# Patient Record
Sex: FEMALE | Race: WHITE | NOT HISPANIC OR LATINO | URBAN - METROPOLITAN AREA
[De-identification: names, ages, dates, MRNs, and addresses within clinical notes are randomized per-mention and may not be internally consistent; named-entity substitution may affect disease eponyms.]

---

## 2019-06-19 ENCOUNTER — EMERGENCY (EMERGENCY)
Facility: HOSPITAL | Age: 54
LOS: 1 days | Discharge: ANOTHER HOSPITAL | End: 2019-06-19
Attending: EMERGENCY MEDICINE | Admitting: EMERGENCY MEDICINE
Payer: COMMERCIAL

## 2019-06-19 VITALS
HEART RATE: 121 BPM | WEIGHT: 259.93 LBS | RESPIRATION RATE: 20 BRPM | SYSTOLIC BLOOD PRESSURE: 184 MMHG | DIASTOLIC BLOOD PRESSURE: 122 MMHG | TEMPERATURE: 98 F

## 2019-06-19 PROCEDURE — 99291 CRITICAL CARE FIRST HOUR: CPT

## 2019-06-19 NOTE — ED ADULT NURSE NOTE - OBJECTIVE STATEMENT
Pt comes from home by EMS. Pt reports her left arm started spasming at 8pm with neck pain. Pt denies chest pain. Pt reports anxiety, taking a xanax at home. Pt breathing fast due to anxiety, reports oxygen was helping her with anxiety. Pt has equal  strength, weakness to left arm. Pt HR 120s, placed on telemetry monitor. Pt reports being constipated for a week and taking laxatives.

## 2019-06-20 ENCOUNTER — INPATIENT (INPATIENT)
Facility: HOSPITAL | Age: 54
LOS: 6 days | Discharge: NURSING, REHAB OR LONG-TERM CARE | DRG: 34 | End: 2019-06-27
Attending: PSYCHIATRY & NEUROLOGY | Admitting: PSYCHIATRY & NEUROLOGY
Payer: COMMERCIAL

## 2019-06-20 VITALS
HEART RATE: 109 BPM | OXYGEN SATURATION: 97 % | SYSTOLIC BLOOD PRESSURE: 170 MMHG | RESPIRATION RATE: 20 BRPM | TEMPERATURE: 98 F | DIASTOLIC BLOOD PRESSURE: 105 MMHG

## 2019-06-20 VITALS
HEART RATE: 119 BPM | WEIGHT: 256.4 LBS | OXYGEN SATURATION: 100 % | DIASTOLIC BLOOD PRESSURE: 111 MMHG | RESPIRATION RATE: 22 BRPM | SYSTOLIC BLOOD PRESSURE: 168 MMHG

## 2019-06-20 DIAGNOSIS — I63.9 CEREBRAL INFARCTION, UNSPECIFIED: ICD-10-CM

## 2019-06-20 LAB
ALBUMIN SERPL ELPH-MCNC: 3.6 G/DL — SIGNIFICANT CHANGE UP (ref 3.3–5)
ALBUMIN SERPL ELPH-MCNC: 3.9 G/DL — SIGNIFICANT CHANGE UP (ref 3.3–5)
ALP SERPL-CCNC: 76 U/L — SIGNIFICANT CHANGE UP (ref 40–120)
ALP SERPL-CCNC: 86 U/L — SIGNIFICANT CHANGE UP (ref 40–120)
ALT FLD-CCNC: 26 U/L — SIGNIFICANT CHANGE UP (ref 10–45)
ALT FLD-CCNC: 33 U/L — SIGNIFICANT CHANGE UP (ref 12–78)
ANION GAP SERPL CALC-SCNC: 16 MMOL/L — SIGNIFICANT CHANGE UP (ref 5–17)
ANION GAP SERPL CALC-SCNC: 18 MMOL/L — HIGH (ref 5–17)
ANION GAP SERPL CALC-SCNC: 9 MMOL/L — SIGNIFICANT CHANGE UP (ref 5–17)
APPEARANCE UR: CLEAR — SIGNIFICANT CHANGE UP
APTT BLD: 23.7 SEC — LOW (ref 27.5–36.3)
APTT BLD: 25.2 SEC — LOW (ref 27.5–36.3)
APTT BLD: 27.3 SEC — LOW (ref 27.5–36.3)
APTT BLD: 31.7 SEC — SIGNIFICANT CHANGE UP (ref 27.5–36.3)
APTT BLD: 39.3 SEC — HIGH (ref 27.5–36.3)
AST SERPL-CCNC: 18 U/L — SIGNIFICANT CHANGE UP (ref 10–40)
AST SERPL-CCNC: 24 U/L — SIGNIFICANT CHANGE UP (ref 15–37)
BASE EXCESS BLDA CALC-SCNC: -4.5 MMOL/L — LOW (ref -2–2)
BASOPHILS # BLD AUTO: 0.1 K/UL — SIGNIFICANT CHANGE UP (ref 0–0.2)
BASOPHILS # BLD AUTO: 0.13 K/UL — SIGNIFICANT CHANGE UP (ref 0–0.2)
BASOPHILS NFR BLD AUTO: 0.4 % — SIGNIFICANT CHANGE UP (ref 0–2)
BASOPHILS NFR BLD AUTO: 0.7 % — SIGNIFICANT CHANGE UP (ref 0–2)
BILIRUB SERPL-MCNC: 1.7 MG/DL — HIGH (ref 0.2–1.2)
BILIRUB SERPL-MCNC: 1.9 MG/DL — HIGH (ref 0.2–1.2)
BILIRUB UR-MCNC: NEGATIVE — SIGNIFICANT CHANGE UP
BLD GP AB SCN SERPL QL: NEGATIVE — SIGNIFICANT CHANGE UP
BLOOD GAS COMMENTS ARTERIAL: SIGNIFICANT CHANGE UP
BLOOD GAS COMMENTS ARTERIAL: SIGNIFICANT CHANGE UP
BUN SERPL-MCNC: 15 MG/DL — SIGNIFICANT CHANGE UP (ref 7–23)
BUN SERPL-MCNC: 15 MG/DL — SIGNIFICANT CHANGE UP (ref 7–23)
BUN SERPL-MCNC: 19 MG/DL — SIGNIFICANT CHANGE UP (ref 7–23)
CALCIUM SERPL-MCNC: 8.4 MG/DL — LOW (ref 8.5–10.1)
CALCIUM SERPL-MCNC: 9 MG/DL — SIGNIFICANT CHANGE UP (ref 8.4–10.5)
CALCIUM SERPL-MCNC: 9.2 MG/DL — SIGNIFICANT CHANGE UP (ref 8.4–10.5)
CHLORIDE SERPL-SCNC: 100 MMOL/L — SIGNIFICANT CHANGE UP (ref 96–108)
CHLORIDE SERPL-SCNC: 103 MMOL/L — SIGNIFICANT CHANGE UP (ref 96–108)
CHLORIDE SERPL-SCNC: 99 MMOL/L — SIGNIFICANT CHANGE UP (ref 96–108)
CO2 SERPL-SCNC: 21 MMOL/L — LOW (ref 22–31)
CO2 SERPL-SCNC: 24 MMOL/L — SIGNIFICANT CHANGE UP (ref 22–31)
CO2 SERPL-SCNC: 24 MMOL/L — SIGNIFICANT CHANGE UP (ref 22–31)
COLOR SPEC: SIGNIFICANT CHANGE UP
CREAT SERPL-MCNC: 0.99 MG/DL — SIGNIFICANT CHANGE UP (ref 0.5–1.3)
CREAT SERPL-MCNC: 1.03 MG/DL — SIGNIFICANT CHANGE UP (ref 0.5–1.3)
CREAT SERPL-MCNC: 1.1 MG/DL — SIGNIFICANT CHANGE UP (ref 0.5–1.3)
D DIMER BLD IA.RAPID-MCNC: 254 NG/ML DDU — HIGH
DIFF PNL FLD: NEGATIVE — SIGNIFICANT CHANGE UP
EOSINOPHIL # BLD AUTO: 0 K/UL — SIGNIFICANT CHANGE UP (ref 0–0.5)
EOSINOPHIL # BLD AUTO: 0.12 K/UL — SIGNIFICANT CHANGE UP (ref 0–0.5)
EOSINOPHIL NFR BLD AUTO: 0.1 % — SIGNIFICANT CHANGE UP (ref 0–6)
EOSINOPHIL NFR BLD AUTO: 0.7 % — SIGNIFICANT CHANGE UP (ref 0–6)
GAS PNL BLDV: SIGNIFICANT CHANGE UP
GLUCOSE SERPL-MCNC: 122 MG/DL — HIGH (ref 70–99)
GLUCOSE SERPL-MCNC: 154 MG/DL — HIGH (ref 70–99)
GLUCOSE SERPL-MCNC: 167 MG/DL — HIGH (ref 70–99)
GLUCOSE UR QL: NEGATIVE — SIGNIFICANT CHANGE UP
HCG SERPL-ACNC: <1 MIU/ML — SIGNIFICANT CHANGE UP
HCG SERPL-ACNC: <2 MIU/ML — SIGNIFICANT CHANGE UP
HCG SERPL-ACNC: <2 MIU/ML — SIGNIFICANT CHANGE UP
HCO3 BLDA-SCNC: 20 MMOL/L — LOW (ref 23–27)
HCT VFR BLD CALC: 47.8 % — HIGH (ref 34.5–45)
HCT VFR BLD CALC: 49.9 % — HIGH (ref 34.5–45)
HCT VFR BLD CALC: 51.5 % — HIGH (ref 34.5–45)
HGB BLD-MCNC: 15.3 G/DL — SIGNIFICANT CHANGE UP (ref 11.5–15.5)
HGB BLD-MCNC: 15.4 G/DL — SIGNIFICANT CHANGE UP (ref 11.5–15.5)
HGB BLD-MCNC: 15.9 G/DL — HIGH (ref 11.5–15.5)
HOROWITZ INDEX BLDA+IHG-RTO: 100 — SIGNIFICANT CHANGE UP
IMM GRANULOCYTES NFR BLD AUTO: 0.4 % — SIGNIFICANT CHANGE UP (ref 0–1.5)
INR BLD: 1.29 RATIO — HIGH (ref 0.88–1.16)
INR BLD: 1.33 RATIO — HIGH (ref 0.88–1.16)
INR BLD: 1.35 RATIO — HIGH (ref 0.88–1.16)
INR BLD: 1.36 RATIO — HIGH (ref 0.88–1.16)
KETONES UR-MCNC: NEGATIVE — SIGNIFICANT CHANGE UP
LACTATE SERPL-SCNC: 1.3 MMOL/L — SIGNIFICANT CHANGE UP (ref 0.7–2)
LEUKOCYTE ESTERASE UR-ACNC: NEGATIVE — SIGNIFICANT CHANGE UP
LYMPHOCYTES # BLD AUTO: 1.15 K/UL — SIGNIFICANT CHANGE UP (ref 1–3.3)
LYMPHOCYTES # BLD AUTO: 1.3 K/UL — SIGNIFICANT CHANGE UP (ref 1–3.3)
LYMPHOCYTES # BLD AUTO: 6.4 % — LOW (ref 13–44)
LYMPHOCYTES # BLD AUTO: 8.5 % — LOW (ref 13–44)
MCHC RBC-ENTMCNC: 22.5 PG — LOW (ref 27–34)
MCHC RBC-ENTMCNC: 23.5 PG — LOW (ref 27–34)
MCHC RBC-ENTMCNC: 24.5 PG — LOW (ref 27–34)
MCHC RBC-ENTMCNC: 30.7 GM/DL — LOW (ref 32–36)
MCHC RBC-ENTMCNC: 31 GM/DL — LOW (ref 32–36)
MCHC RBC-ENTMCNC: 32.2 GM/DL — SIGNIFICANT CHANGE UP (ref 32–36)
MCV RBC AUTO: 73.4 FL — LOW (ref 80–100)
MCV RBC AUTO: 75.9 FL — LOW (ref 80–100)
MCV RBC AUTO: 76 FL — LOW (ref 80–100)
MONOCYTES # BLD AUTO: 0.5 K/UL — SIGNIFICANT CHANGE UP (ref 0–0.9)
MONOCYTES # BLD AUTO: 0.51 K/UL — SIGNIFICANT CHANGE UP (ref 0–0.9)
MONOCYTES NFR BLD AUTO: 2.9 % — SIGNIFICANT CHANGE UP (ref 2–14)
MONOCYTES NFR BLD AUTO: 3.3 % — SIGNIFICANT CHANGE UP (ref 2–14)
NEUTROPHILS # BLD AUTO: 13.9 K/UL — HIGH (ref 1.8–7.4)
NEUTROPHILS # BLD AUTO: 15.89 K/UL — HIGH (ref 1.8–7.4)
NEUTROPHILS NFR BLD AUTO: 87.7 % — HIGH (ref 43–77)
NEUTROPHILS NFR BLD AUTO: 88.9 % — HIGH (ref 43–77)
NITRITE UR-MCNC: NEGATIVE — SIGNIFICANT CHANGE UP
NRBC # BLD: 0 /100 WBCS — SIGNIFICANT CHANGE UP (ref 0–0)
PCO2 BLDA: 46 MMHG — SIGNIFICANT CHANGE UP (ref 32–46)
PH BLDA: 7.28 — LOW (ref 7.35–7.45)
PH UR: 6.5 — SIGNIFICANT CHANGE UP (ref 5–8)
PLATELET # BLD AUTO: 334 K/UL — SIGNIFICANT CHANGE UP (ref 150–400)
PLATELET # BLD AUTO: 397 K/UL — SIGNIFICANT CHANGE UP (ref 150–400)
PLATELET # BLD AUTO: 397 K/UL — SIGNIFICANT CHANGE UP (ref 150–400)
PO2 BLDA: 134 MMHG — HIGH (ref 74–108)
POTASSIUM SERPL-MCNC: 3.7 MMOL/L — SIGNIFICANT CHANGE UP (ref 3.5–5.3)
POTASSIUM SERPL-MCNC: 4 MMOL/L — SIGNIFICANT CHANGE UP (ref 3.5–5.3)
POTASSIUM SERPL-MCNC: 4.5 MMOL/L — SIGNIFICANT CHANGE UP (ref 3.5–5.3)
POTASSIUM SERPL-SCNC: 3.7 MMOL/L — SIGNIFICANT CHANGE UP (ref 3.5–5.3)
POTASSIUM SERPL-SCNC: 4 MMOL/L — SIGNIFICANT CHANGE UP (ref 3.5–5.3)
POTASSIUM SERPL-SCNC: 4.5 MMOL/L — SIGNIFICANT CHANGE UP (ref 3.5–5.3)
PROT SERPL-MCNC: 7.1 G/DL — SIGNIFICANT CHANGE UP (ref 6–8.3)
PROT SERPL-MCNC: 7.1 G/DL — SIGNIFICANT CHANGE UP (ref 6–8.3)
PROT UR-MCNC: ABNORMAL
PROTHROM AB SERPL-ACNC: 14.7 SEC — HIGH (ref 10–12.9)
PROTHROM AB SERPL-ACNC: 15.4 SEC — HIGH (ref 10–12.9)
PROTHROM AB SERPL-ACNC: 15.5 SEC — HIGH (ref 10–12.9)
PROTHROM AB SERPL-ACNC: 15.8 SEC — HIGH (ref 10–12.9)
RAPID RVP RESULT: SIGNIFICANT CHANGE UP
RBC # BLD: 6.29 M/UL — HIGH (ref 3.8–5.2)
RBC # BLD: 6.78 M/UL — HIGH (ref 3.8–5.2)
RBC # BLD: 6.8 M/UL — HIGH (ref 3.8–5.2)
RBC # FLD: 18.3 % — HIGH (ref 10.3–14.5)
RBC # FLD: 18.6 % — HIGH (ref 10.3–14.5)
RBC # FLD: 20.4 % — HIGH (ref 10.3–14.5)
RH IG SCN BLD-IMP: POSITIVE — SIGNIFICANT CHANGE UP
SAO2 % BLDA: 98 % — HIGH (ref 92–96)
SODIUM SERPL-SCNC: 136 MMOL/L — SIGNIFICANT CHANGE UP (ref 135–145)
SODIUM SERPL-SCNC: 138 MMOL/L — SIGNIFICANT CHANGE UP (ref 135–145)
SODIUM SERPL-SCNC: 140 MMOL/L — SIGNIFICANT CHANGE UP (ref 135–145)
SP GR SPEC: 1.02 — SIGNIFICANT CHANGE UP (ref 1.01–1.02)
T3 SERPL-MCNC: 89 NG/DL — SIGNIFICANT CHANGE UP (ref 80–200)
T4 AB SER-ACNC: 7.3 UG/DL — SIGNIFICANT CHANGE UP (ref 4.6–12)
TROPONIN I SERPL-MCNC: <.015 NG/ML — SIGNIFICANT CHANGE UP (ref 0.01–0.04)
TROPONIN T, HIGH SENSITIVITY RESULT: 28 NG/L — SIGNIFICANT CHANGE UP (ref 0–51)
TSH SERPL-MCNC: 7.3 UIU/ML — HIGH (ref 0.27–4.2)
UROBILINOGEN FLD QL: NEGATIVE — SIGNIFICANT CHANGE UP
WBC # BLD: 15.8 K/UL — HIGH (ref 3.8–10.5)
WBC # BLD: 16.9 K/UL — HIGH (ref 3.8–10.5)
WBC # BLD: 17.88 K/UL — HIGH (ref 3.8–10.5)
WBC # FLD AUTO: 15.8 K/UL — HIGH (ref 3.8–10.5)
WBC # FLD AUTO: 16.9 K/UL — HIGH (ref 3.8–10.5)
WBC # FLD AUTO: 17.88 K/UL — HIGH (ref 3.8–10.5)

## 2019-06-20 PROCEDURE — 0042T: CPT

## 2019-06-20 PROCEDURE — 70450 CT HEAD/BRAIN W/O DYE: CPT | Mod: 26,59

## 2019-06-20 PROCEDURE — 71045 X-RAY EXAM CHEST 1 VIEW: CPT | Mod: 26

## 2019-06-20 PROCEDURE — 95816 EEG AWAKE AND DROWSY: CPT | Mod: 26

## 2019-06-20 PROCEDURE — 99223 1ST HOSP IP/OBS HIGH 75: CPT

## 2019-06-20 PROCEDURE — 80053 COMPREHEN METABOLIC PANEL: CPT

## 2019-06-20 PROCEDURE — 99291 CRITICAL CARE FIRST HOUR: CPT

## 2019-06-20 PROCEDURE — 93010 ELECTROCARDIOGRAM REPORT: CPT | Mod: 59

## 2019-06-20 PROCEDURE — 70498 CT ANGIOGRAPHY NECK: CPT

## 2019-06-20 PROCEDURE — 71045 X-RAY EXAM CHEST 1 VIEW: CPT

## 2019-06-20 PROCEDURE — 84702 CHORIONIC GONADOTROPIN TEST: CPT

## 2019-06-20 PROCEDURE — 85610 PROTHROMBIN TIME: CPT

## 2019-06-20 PROCEDURE — 96376 TX/PRO/DX INJ SAME DRUG ADON: CPT

## 2019-06-20 PROCEDURE — 95951: CPT | Mod: 26

## 2019-06-20 PROCEDURE — 99285 EMERGENCY DEPT VISIT HI MDM: CPT | Mod: 25

## 2019-06-20 PROCEDURE — 99291 CRITICAL CARE FIRST HOUR: CPT | Mod: 25

## 2019-06-20 PROCEDURE — 70551 MRI BRAIN STEM W/O DYE: CPT | Mod: 26

## 2019-06-20 PROCEDURE — 70450 CT HEAD/BRAIN W/O DYE: CPT | Mod: 26,59,77

## 2019-06-20 PROCEDURE — 93005 ELECTROCARDIOGRAM TRACING: CPT

## 2019-06-20 PROCEDURE — 36415 COLL VENOUS BLD VENIPUNCTURE: CPT

## 2019-06-20 PROCEDURE — 70498 CT ANGIOGRAPHY NECK: CPT | Mod: 26

## 2019-06-20 PROCEDURE — 82803 BLOOD GASES ANY COMBINATION: CPT

## 2019-06-20 PROCEDURE — 93010 ELECTROCARDIOGRAM REPORT: CPT

## 2019-06-20 PROCEDURE — 85027 COMPLETE CBC AUTOMATED: CPT

## 2019-06-20 PROCEDURE — 85730 THROMBOPLASTIN TIME PARTIAL: CPT

## 2019-06-20 PROCEDURE — 83880 ASSAY OF NATRIURETIC PEPTIDE: CPT

## 2019-06-20 PROCEDURE — 70496 CT ANGIOGRAPHY HEAD: CPT

## 2019-06-20 PROCEDURE — 71045 X-RAY EXAM CHEST 1 VIEW: CPT | Mod: 26,77

## 2019-06-20 PROCEDURE — 70450 CT HEAD/BRAIN W/O DYE: CPT

## 2019-06-20 PROCEDURE — 84484 ASSAY OF TROPONIN QUANT: CPT

## 2019-06-20 PROCEDURE — 70496 CT ANGIOGRAPHY HEAD: CPT | Mod: 26

## 2019-06-20 PROCEDURE — 96374 THER/PROPH/DIAG INJ IV PUSH: CPT

## 2019-06-20 RX ORDER — HEPARIN SODIUM 5000 [USP'U]/ML
15.5 INJECTION INTRAVENOUS; SUBCUTANEOUS
Qty: 25000 | Refills: 0 | Status: DISCONTINUED | OUTPATIENT
Start: 2019-06-20 | End: 2019-06-20

## 2019-06-20 RX ORDER — INSULIN LISPRO 100/ML
VIAL (ML) SUBCUTANEOUS
Refills: 0 | Status: DISCONTINUED | OUTPATIENT
Start: 2019-06-20 | End: 2019-06-20

## 2019-06-20 RX ORDER — HEPARIN SODIUM 5000 [USP'U]/ML
1200 INJECTION INTRAVENOUS; SUBCUTANEOUS
Qty: 25000 | Refills: 0 | Status: DISCONTINUED | OUTPATIENT
Start: 2019-06-20 | End: 2019-06-20

## 2019-06-20 RX ORDER — LEVETIRACETAM 250 MG/1
1000 TABLET, FILM COATED ORAL EVERY 12 HOURS
Refills: 0 | Status: DISCONTINUED | OUTPATIENT
Start: 2019-06-20 | End: 2019-06-22

## 2019-06-20 RX ORDER — FUROSEMIDE 40 MG
20 TABLET ORAL ONCE
Refills: 0 | Status: COMPLETED | OUTPATIENT
Start: 2019-06-20 | End: 2019-06-20

## 2019-06-20 RX ORDER — HYDRALAZINE HCL 50 MG
5 TABLET ORAL EVERY 6 HOURS
Refills: 0 | Status: DISCONTINUED | OUTPATIENT
Start: 2019-06-20 | End: 2019-06-22

## 2019-06-20 RX ORDER — HEPARIN SODIUM 5000 [USP'U]/ML
1550 INJECTION INTRAVENOUS; SUBCUTANEOUS
Qty: 25000 | Refills: 0 | Status: DISCONTINUED | OUTPATIENT
Start: 2019-06-20 | End: 2019-06-21

## 2019-06-20 RX ORDER — NICOTINE POLACRILEX 2 MG
1 GUM BUCCAL DAILY
Refills: 0 | Status: DISCONTINUED | OUTPATIENT
Start: 2019-06-20 | End: 2019-06-27

## 2019-06-20 RX ORDER — SODIUM CHLORIDE 9 MG/ML
1000 INJECTION INTRAMUSCULAR; INTRAVENOUS; SUBCUTANEOUS ONCE
Refills: 0 | Status: COMPLETED | OUTPATIENT
Start: 2019-06-20 | End: 2019-06-20

## 2019-06-20 RX ORDER — LABETALOL HCL 100 MG
5 TABLET ORAL ONCE
Refills: 0 | Status: COMPLETED | OUTPATIENT
Start: 2019-06-20 | End: 2019-06-20

## 2019-06-20 RX ORDER — LABETALOL HCL 100 MG
10 TABLET ORAL ONCE
Refills: 0 | Status: COMPLETED | OUTPATIENT
Start: 2019-06-20 | End: 2019-06-21

## 2019-06-20 RX ORDER — LEVETIRACETAM 250 MG/1
1000 TABLET, FILM COATED ORAL ONCE
Refills: 0 | Status: DISCONTINUED | OUTPATIENT
Start: 2019-06-20 | End: 2019-06-20

## 2019-06-20 RX ORDER — HEPARIN SODIUM 5000 [USP'U]/ML
INJECTION INTRAVENOUS; SUBCUTANEOUS
Qty: 25000 | Refills: 0 | Status: DISCONTINUED | OUTPATIENT
Start: 2019-06-20 | End: 2019-06-20

## 2019-06-20 RX ORDER — ACETAMINOPHEN 500 MG
650 TABLET ORAL EVERY 6 HOURS
Refills: 0 | Status: DISCONTINUED | OUTPATIENT
Start: 2019-06-20 | End: 2019-06-27

## 2019-06-20 RX ORDER — ATORVASTATIN CALCIUM 80 MG/1
80 TABLET, FILM COATED ORAL AT BEDTIME
Refills: 0 | Status: DISCONTINUED | OUTPATIENT
Start: 2019-06-20 | End: 2019-06-27

## 2019-06-20 RX ORDER — LEVETIRACETAM 250 MG/1
1000 TABLET, FILM COATED ORAL ONCE
Refills: 0 | Status: COMPLETED | OUTPATIENT
Start: 2019-06-20 | End: 2019-06-20

## 2019-06-20 RX ORDER — HYDRALAZINE HCL 50 MG
5 TABLET ORAL ONCE
Refills: 0 | Status: DISCONTINUED | OUTPATIENT
Start: 2019-06-20 | End: 2019-06-20

## 2019-06-20 RX ORDER — HEPARIN SODIUM 5000 [USP'U]/ML
4500 INJECTION INTRAVENOUS; SUBCUTANEOUS EVERY 6 HOURS
Refills: 0 | Status: DISCONTINUED | OUTPATIENT
Start: 2019-06-20 | End: 2019-06-20

## 2019-06-20 RX ORDER — FOSPHENYTOIN 50 MG/ML
2300 INJECTION INTRAMUSCULAR; INTRAVENOUS ONCE
Refills: 0 | Status: DISCONTINUED | OUTPATIENT
Start: 2019-06-20 | End: 2019-06-20

## 2019-06-20 RX ORDER — HEPARIN SODIUM 5000 [USP'U]/ML
9500 INJECTION INTRAVENOUS; SUBCUTANEOUS EVERY 6 HOURS
Refills: 0 | Status: DISCONTINUED | OUTPATIENT
Start: 2019-06-20 | End: 2019-06-20

## 2019-06-20 RX ADMIN — LEVETIRACETAM 400 MILLIGRAM(S): 250 TABLET, FILM COATED ORAL at 19:03

## 2019-06-20 RX ADMIN — Medication 1 MILLIGRAM(S): at 18:27

## 2019-06-20 RX ADMIN — Medication 20 MILLIGRAM(S): at 17:09

## 2019-06-20 RX ADMIN — Medication 0.5 MILLIGRAM(S): at 01:19

## 2019-06-20 RX ADMIN — Medication 1 PATCH: at 17:05

## 2019-06-20 RX ADMIN — Medication 2 MILLIGRAM(S): at 09:53

## 2019-06-20 RX ADMIN — SODIUM CHLORIDE 1000 MILLILITER(S): 9 INJECTION INTRAMUSCULAR; INTRAVENOUS; SUBCUTANEOUS at 00:29

## 2019-06-20 RX ADMIN — Medication 0.5 MILLIGRAM(S): at 09:45

## 2019-06-20 RX ADMIN — Medication 1 MILLIGRAM(S): at 06:23

## 2019-06-20 RX ADMIN — Medication 5 MILLIGRAM(S): at 08:25

## 2019-06-20 RX ADMIN — HEPARIN SODIUM 15.5 UNIT(S)/HR: 5000 INJECTION INTRAVENOUS; SUBCUTANEOUS at 23:43

## 2019-06-20 RX ADMIN — Medication 1 MILLIGRAM(S): at 02:45

## 2019-06-20 RX ADMIN — LEVETIRACETAM 400 MILLIGRAM(S): 250 TABLET, FILM COATED ORAL at 09:45

## 2019-06-20 RX ADMIN — ATORVASTATIN CALCIUM 80 MILLIGRAM(S): 80 TABLET, FILM COATED ORAL at 22:06

## 2019-06-20 NOTE — ED ADULT NURSE REASSESSMENT NOTE - NS ED NURSE REASSESS COMMENT FT1
Pt's left arm noted to be twitching with shortness of breath. MD Mittal called to bedside. Vitals as charted, pt given 1mg of ativan. Set up for intubation as pt breathing extremely shallow on nonrebreather, unable to catch breathing. Respiratory at bedside. Pt started to calm down, breathing improved, no intubation. Will attempt bipap, abg being drawn.

## 2019-06-20 NOTE — CONSULT NOTE ADULT - ASSESSMENT
53F w/ pmhx of obesity, 40 pack year smoking history present with right MCA stroke setting of bilateral severe carotid stenosis w/ course complicated by focal seizure activity and hypoxia     Assessment   # R MCA stroke - There is concern in the CTA for filling defect in the anterior cavernous segment suspicious for thrombus causing critical stenosis/focal occlusion in the right ICA.   # Carotid Artery Stenosis - bilateral severe disease in the internal carotids.  # Seizure Activity  # Hypertensive emergency - Patient appears to have flash pulmonary edema in the setting of hypertensive crisis requiring escalating respiratory support including BiPAP. Lungs w/ diffuse crackles. Pro-BNP is likely underestimating in setting of obesity.   # Obesity    Plan  # Continue with stroke care per Neurology  # Aspirin, Statin  # Recommend IV 40mg Lasix w/ HTN management per stroke protocol.   # TTE for evaluate for baseline cardiac assessment and will also need KEAGAN to evaluate for cardiac source of embolism  # Will evaluate for carotid stenting    ENRIQUETA Reese MD  Cardiology Fellow (Vascular Cardiology Service)  Cell: [phone] (Weekdays until 5PM)   Please check [email or web address] (Username: Robinson) for remaining hours and daily cardiology fellow schedule and contact information. 53F w/ pmhx of obesity, 40 pack year smoking history present with right MCA stroke setting of bilateral severe carotid stenosis w/ course complicated by focal seizure activity and hypoxia     Assessment   # R MCA stroke - There is concern in the CTA for filling defect in the anterior cavernous segment suspicious for thrombus causing critical stenosis/focal occlusion in the right ICA.   # Carotid Artery Stenosis - bilateral severe disease in the internal carotids.  # Seizure Activity  # Hypertensive emergency - Patient appears to have flash pulmonary edema in the setting of hypertensive crisis requiring escalating respiratory support including BiPAP. Lungs w/ diffuse crackles. Pro-BNP is likely underestimating in setting of obesity.   # Obesity    Plan  # Continue with stroke care per Neurology  # Statin  # Recommend IV 40mg Lasix to help will pulmonary congestion  # HTN management per stroke protocol.   # TTE for evaluate for baseline cardiac assessment and will also need KEAGAN to evaluate for cardiac source of embolism  # Will evaluate for carotid stenting vs CEA     Seen with Dr. Jessica Reese MD  Cardiology Fellow (Vascular Cardiology Service)  Cell: [phone] (Weekdays until 5PM)   Please check [email or web address] (Username: Robinson) for remaining hours and daily cardiology fellow schedule and contact information.

## 2019-06-20 NOTE — OCCUPATIONAL THERAPY INITIAL EVALUATION ADULT - STRENGTHENING, PT EVAL
GOAL: Pt will increase LUE strength to 3+/5 to increase participation in functional tasks in 4 weeks

## 2019-06-20 NOTE — H&P ADULT - ATTENDING COMMENTS
Ms. Sanon is a 52y/o F RH  PMH Anxiety, 40 pk/yr smoking hx(still active), does not get regular checkups presented to [facility] w/ L arm numbness, spasms starting 2000 6/19. LKW 6/19 2000. A phone consult was performed with [facility], Ms. Sanon is a 52y/o F RH  PMH Anxiety, 40 pk/yr smoking hx(still active), does not get regular checkups presented to [facility] w/ L arm numbness, spasms starting 2000 6/19. KRISHW 6/19 2000. A phone consult was performed with [facility] mid she had some worsening of her neurological status but left arm weakness and spasms. At that time she was given Ativan for possible seizure.  A CT noncontrast and CT angiogram of head and neck revealed right frontal hypodensity suggestive of right MCA infarction in the setting of bilateral severe carotid stenosis. She was then transferred to [facility], on exam a stroke scale was 6, alert, awake however unable to move her left arm. She was also noticed to have continuous twitching of left arm and face, drop in oxygen saturation for which she was put on BiPAP in the MICU/neurological ICU consultation was obtained.  Impression: Right MCA artery ischemic infarct in the setting of large artery atherosclerosis, bilateral carotid extracranial stenosis, history of being a smoker for 40 years as her primary risk factor.  head CT after change of her neurological state status has no new infarct or hemorrhage, therefore hoping to resume IV heparin drip Coumadin PTT 50-70  Continue Keppra, speech and swallow eval, continuous EEG monitoring to rule out status epilepticus  For her breathing/respiration, check lower extremity Doppler to rule out DVT and possible CT angio chest when able.  NeuroSx  consulted for revascularization of carotid stenosis. She will need an MRI when stable.

## 2019-06-20 NOTE — ED ADULT NURSE REASSESSMENT NOTE - NS ED NURSE REASSESS COMMENT FT1
pt. administered 5 mg of labetalol IVP for systolic bp of 190s, goal is for systolic of 180 or less, post administration pts systolic bp 185, will continue to evaluate and assess pts neuro status and VS and bp. left arm remains immobile accompanied by twitching and spasms of the left arm visibly seen upon assessing pt. report given to Stroke unit, pt pending transport. no worsening neuro deficits present. safety and fall precautions maintained,.

## 2019-06-20 NOTE — H&P ADULT - HISTORY OF PRESENT ILLNESS
54y/o F PMH Anxiety presented to [facility] w/ L arm numbness, spasms starting 2000 6/19. LKW 6/19 2000. CT/CTA H/N done w/ severe stenosis at the origin of the right internal carotid artery with filling defect in the anterior cavernous segment suspicious for critical stenosis/focal occlusion as well as moderate to severe stenosis of the proximal cervical segment and mild stenosis of the posterior cavernous segment of the left internal carotid artery. Dense L arm paresis noted at 0345AM, transferred to [facility] for potential angio and further management of condition. On initial assessment in ED, NIHSS 6. 54y/o F RH  PMH Anxiety, 40 pk/yr smoking hx(still active), does not get regular checkups presented to [facility] w/ L arm numbness, spasms starting 2000 6/19. LKW 6/19 2000. CT/CTA H/N done w/ severe stenosis at the origin of the right internal carotid artery with filling defect in the anterior cavernous segment suspicious for critical stenosis/focal occlusion as well as moderate to severe stenosis of the proximal cervical segment and mild stenosis of the posterior cavernous segment of the left internal carotid artery. Dense L arm paresis noted at 0345AM, transferred to [facility] for potential angio and further management of condition. On initial assessment in ED, NIHSS 6.

## 2019-06-20 NOTE — CONSULT NOTE ADULT - ASSESSMENT
53F here with RMCA infarct on MRI found to have b/l carotid stenosis of ICA origin worse on right (severe) and SANDIE distal stenosis possible thrombus on CTA  - patient likely to benefit from diagnostic cerebral angiography to better define degree of stenosis and intracranial disease, will plan for possible carotid stenting at the time  - Will plan for angiography tomorrow pending cardiology clearance, patient pending TTE  - cont supportive care per stroke team

## 2019-06-20 NOTE — CONSULT NOTE ADULT - SUBJECTIVE AND OBJECTIVE BOX
Cardiology Consult  Faculty Cardiology  ==========================================================================    Chief Complaint:     HPI:  52y/o F RH  PMH Anxiety, 40 pk/yr smoking hx(still active), does not get regular checkups presented to [facility] w/ L arm numbness, spasms starting 2000 6/19. LKW 6/19 2000. CT/CTA H/N done w/ severe stenosis at the origin of the right internal carotid artery with filling defect in the anterior cavernous segment suspicious for critical stenosis/focal occlusion as well as moderate to severe stenosis of the proximal cervical segment and mild stenosis of the posterior cavernous segment of the left internal carotid artery. Dense L arm paresis noted at 0345AM, transferred to [facility] for potential angio and further management of condition. On initial assessment in ED, NIHSS 6. (20 Jun 2019 07:01)    PMH:   Anxiety  No pertinent past medical history    PSH:   No significant past surgical history    Medications:   atorvastatin 80 milliGRAM(s) Oral at bedtime  furosemide   Injectable 20 milliGRAM(s) IV Push once  heparin  Infusion 1200 Unit(s)/Hr IV Continuous <Continuous>  hydrALAZINE Injectable 5 milliGRAM(s) IV Push every 6 hours PRN  labetalol Injectable 10 milliGRAM(s) IV Push once PRN  levETIRAcetam  IVPB 1000 milliGRAM(s) IV Intermittent every 12 hours  nicotine - 21 mG/24Hr(s) Patch 1 patch Transdermal daily    Allergies:  No Known Allergies    FAMILY HISTORY:    Social History:  Smoking: No active  Alcohol:  Drugs:    Review of Systems:  Constitutional: [ ] Fever [ ] Chills [ ] Fatigue [ ] Weight change   HEENT: [ ] Blurred vision [ ] Eye Pain [ ] Headache [ ] Runny nose [ ] Sore Throat   Respiratory: [ ] Cough [ ] Wheezing [ ] Shortness of breath  Cardiovascular: [ ] Chest Pain [ ] Palpitations [ ] GILMORE [ ] PND [ ] Orthopnea  Gastrointestinal: [ ] Abdominal Pain [ ] Diarrhea [ ] Constipation [ ] Hemorrhoids [ ] Nausea [ ] Vomiting  Genitourinary: [ ] Nocturia [ ] Dysuria [ ] Incontinence  Extremities: [ ] Swelling [ ] Joint Pain  Neurologic: [ ] Focal deficit [ ] Paresthesias [ ] Syncope  Lymphatic: [ ] Swelling [ ] Lymphadenopathy   Skin: [ ] Rash [ ] Ecchymoses [ ] Wounds [ ] Lesions  Psychiatry: [ ] Depression [ ] Suicidal/Homicidal Ideation [ ] Anxiety [ ] Sleep Disturbances  [X ] 10 point review of systems is otherwise negative except as mentioned above            [ ]Unable to obtain    Physical Exam:  T(C): 36.7 (06-20-19 @ 07:30), Max: 37.1 (06-20-19 @ 04:32)  HR: 89 (06-20-19 @ 14:00) (83 - 148)  BP: 182/96 (06-20-19 @ 14:00) (153/82 - 235/134)  RR: 18 (06-20-19 @ 14:00) (16 - 30)  SpO2: 97% (06-20-19 @ 14:00) (93% - 100%)  Wt(kg): --    Daily     Daily     Appearance: [x ] Normal [x ] NAD  Eyes: [x ] PERRL [x ] EOMI  HENT: [x ] Normal oral muscosa [x ]NC/AT  Neck: [x] Supple [x] FROM [x] No JVD  Cardiovascular: [x ] S1, S2 [x ] RRR [x ] No m/r/g [x ]No edema  Respiratory: [x ] Clear to auscultation bilaterally [x ] Normal Effort  Gastrointestinal: [x ] Soft [x ] Non-tender [x ] Non-distended [x ] BS+  Musculoskeletal: [x ] No clubbing [x] No joint deformity   Neurologic: [x ] Non-focal  Lymphatic: [x ] No lymphadenopathy  Psychiatry: [x ] AAOx3 [x ] Mood & affect appropriate  Skin: [ x] No rashes [ x] No ecchymoses [x ] No cyanosis [x ] warm and dry    Procedural Access Site: [ ] No hematoma [ ] Non-tender to palpation [ ] 2+ pulse [ ] No bruit [ ] No Ecchymosis    Cardiovascular Diagnostic Testing:  ECG:    Echo:    Stress Testing:    Cath:    Interpretation of Telemetry:    Imaging:    Labs:                        15.9   16.9  )-----------( 397      ( 20 Jun 2019 10:15 )             51.5     06-20    138  |  99  |  15  ----------------------------<  167<H>  4.5   |  21<L>  |  1.03    Ca    9.2      20 Jun 2019 10:15    TPro  7.1  /  Alb  3.9  /  TBili  1.9<H>  /  DBili  x   /  AST  18  /  ALT  26  /  AlkPhos  76  06-20    PT/INR - ( 20 Jun 2019 10:15 )   PT: 15.4 sec;   INR: 1.33 ratio         PTT - ( 20 Jun 2019 10:15 )  PTT:25.2 sec  CARDIAC MARKERS ( 20 Jun 2019 00:17 )  <.015 ng/mL / x     / x     / x     / x          Serum Pro-Brain Natriuretic Peptide: 2385 pg/mL (06-20 @ 00:17) Cardiology Consult  Faculty Cardiology  ==========================================================================    Chief Complaint:     HPI:  52y/o F RH  PMH Anxiety, 40 pk/yr smoking hx(still active), does not get regular checkups presented to [facility] w/ L arm numbness, spasms starting 2000 6/19. LKW 6/19 2000. CT/CTA H/N done w/ severe stenosis at the origin of the right internal carotid artery with filling defect in the anterior cavernous segment suspicious for critical stenosis/focal occlusion as well as moderate to severe stenosis of the proximal cervical segment and mild stenosis of the posterior cavernous segment of the left internal carotid artery. Dense L arm paresis noted at 0345AM, transferred to [facility] for potential angio and further management of condition. On initial assessment in ED, NIHSS 6. (20 Jun 2019 07:01)  At time of evaluation patient denies cardiac complaints. She denies chest pain. Breathing is better but patient is on high flow.   PMH:   Anxiety  No pertinent past medical history    PSH:   No significant past surgical history    Medications:   atorvastatin 80 milliGRAM(s) Oral at bedtime  furosemide   Injectable 20 milliGRAM(s) IV Push once  heparin  Infusion 1200 Unit(s)/Hr IV Continuous <Continuous>  hydrALAZINE Injectable 5 milliGRAM(s) IV Push every 6 hours PRN  labetalol Injectable 10 milliGRAM(s) IV Push once PRN  levETIRAcetam  IVPB 1000 milliGRAM(s) IV Intermittent every 12 hours  nicotine - 21 mG/24Hr(s) Patch 1 patch Transdermal daily    Allergies:  No Known Allergies    FAMILY HISTORY:    Social History:  Smoking: No active  Alcohol:  Drugs:    Review of Systems:  Constitutional: [ ] Fever [ ] Chills [ ] Fatigue [ ] Weight change   HEENT: [ ] Blurred vision [ ] Eye Pain [ ] Headache [ ] Runny nose [ ] Sore Throat   Respiratory: [ ] Cough [ ] Wheezing [ ] Shortness of breath  Cardiovascular: [ ] Chest Pain [ ] Palpitations [ ] GILMORE [ ] PND [ ] Orthopnea  Gastrointestinal: [ ] Abdominal Pain [ ] Diarrhea [ ] Constipation [ ] Hemorrhoids [ ] Nausea [ ] Vomiting  Genitourinary: [ ] Nocturia [ ] Dysuria [ ] Incontinence  Extremities: [ ] Swelling [ ] Joint Pain  Neurologic: [ ] Focal deficit [ ] Paresthesias [ ] Syncope  Lymphatic: [ ] Swelling [ ] Lymphadenopathy   Skin: [ ] Rash [ ] Ecchymoses [ ] Wounds [ ] Lesions  Psychiatry: [ ] Depression [ ] Suicidal/Homicidal Ideation [ ] Anxiety [ ] Sleep Disturbances  [X ] 10 point review of systems is otherwise negative except as mentioned above            [ ]Unable to obtain    Physical Exam:  T(C): 36.7 (06-20-19 @ 07:30), Max: 37.1 (06-20-19 @ 04:32)  HR: 89 (06-20-19 @ 14:00) (83 - 148)  BP: 182/96 (06-20-19 @ 14:00) (153/82 - 235/134)  RR: 18 (06-20-19 @ 14:00) (16 - 30)  SpO2: 97% (06-20-19 @ 14:00) (93% - 100%)  Wt(kg): --    Daily     Daily     Appearance: [x ] Normal [x ] NAD  Eyes: [x ] PERRL [x ] EOMI  HENT: [x ] Normal oral muscosa [x ]NC/AT  Neck: [x] Supple [x] FROM [x] No JVD  Cardiovascular: [x ] S1, S2 [x ] RRR [x ] No m/r/g [x ]No edema  Respiratory: [x ] Clear to auscultation bilaterally [x ] Normal Effort  Gastrointestinal: [x ] Soft [x ] Non-tender [x ] Non-distended [x ] BS+  Musculoskeletal: [x ] No clubbing [x] No joint deformity   Neurologic: [x ] Non-focal  Lymphatic: [x ] No lymphadenopathy  Psychiatry: [x ] AAOx3 [x ] Mood & affect appropriate  Skin: [ x] No rashes [ x] No ecchymoses [x ] No cyanosis [x ] warm and dry    Procedural Access Site: [ ] No hematoma [ ] Non-tender to palpation [ ] 2+ pulse [ ] No bruit [ ] No Ecchymosis    Cardiovascular Diagnostic Testing:  ECG: Sinus tach. PFB. LAE. No acute ST-T changes    Echo:    Stress Testing:    Cath:    Interpretation of Telemetry:    Imaging:    Labs:                        15.9   16.9  )-----------( 397      ( 20 Jun 2019 10:15 )             51.5     06-20    138  |  99  |  15  ----------------------------<  167<H>  4.5   |  21<L>  |  1.03    Ca    9.2      20 Jun 2019 10:15    TPro  7.1  /  Alb  3.9  /  TBili  1.9<H>  /  DBili  x   /  AST  18  /  ALT  26  /  AlkPhos  76  06-20    PT/INR - ( 20 Jun 2019 10:15 )   PT: 15.4 sec;   INR: 1.33 ratio         PTT - ( 20 Jun 2019 10:15 )  PTT:25.2 sec  CARDIAC MARKERS ( 20 Jun 2019 00:17 )  <.015 ng/mL / x     / x     / x     / x          Serum Pro-Brain Natriuretic Peptide: 2385 pg/mL (06-20 @ 00:17)

## 2019-06-20 NOTE — PHYSICAL THERAPY INITIAL EVALUATION ADULT - PRECAUTIONS/LIMITATIONS, REHAB EVAL
CONT: ABG revealed hypoxemia and mild hypercapnia after 1.5 mg of ativan were given. L arm paresis was seen acutely at 3:45 AM according to critical care [employer] note. On initial assessment in ED, NIHSS 6. CT Head 6/20: No interval change in right middle cerebral artery infarct involving the right precentral gyrus and centrum semiovale. No hemorrhage. ASPECTS score 8 out of 10.

## 2019-06-20 NOTE — ED ADULT NURSE REASSESSMENT NOTE - NS ED NURSE REASSESS COMMENT FT1
Pt's left arm involuntarily spasming, MD Mittal called to room, MD Mittal at bedside. Pt given 0.5mg of ativan per MD. Pt's HR elevated during episode. Pt calmed down and taken to CT scan.

## 2019-06-20 NOTE — PHYSICAL THERAPY INITIAL EVALUATION ADULT - TRANSFER SAFETY CONCERNS NOTED: SIT/STAND, REHAB EVAL
decreased step length/decreased weight-shifting ability/stepping too close to front of assistive device

## 2019-06-20 NOTE — RAPID RESPONSE TEAM SUMMARY - NSADDTLFINDINGSRRT_GEN_ALL_CORE
ABG shows adequate oxygenation and ventilation.  Primary Neuro team at bedside, started Keppra load.  Discussed with Stroke Attending.

## 2019-06-20 NOTE — RAPID RESPONSE TEAM SUMMARY - NSSITUATIONBACKGROUNDRRT_GEN_ALL_CORE
54yo F Smoker with PMH of Anxiety transferred from [facility] after p/w L Arm Numbness/Twitching found to have acute R MCA infarct with severe stenosis of the R ICA. ?Patient may have been having seizure activity overnight as well. At OSH, she developed respiratory distress and was placed on BIPAP.    Upon arrival from the ED, patient was noted to have L arm twitching and respiratory distress. VS acceptable allowing for permissive HTN in setting of acute CVA. SpO2  on BIPAP. Patient was visibly anxious and noted to have involuntary L arm twitching. She was given 2mg of Ativan and she felt somewhat better. Twitching resolved and she reported improvement of her respiratory status.

## 2019-06-20 NOTE — CHART NOTE - NSCHARTNOTEFT_GEN_A_CORE
Pt initially arrived to the stroke unit on bipap with moderate-severe continuous rhythmic twitching of the LUE and and left side of her face. MS was in tact (fluent, AAOx3). 2.5mg of ativan given with a load of IV Keppra 1g. Case was briefly discussed with the epilepsy attending for recommendations. Will consult later if needed. Stat EEG ordered and EEG tech called.   MICU consulted and RRTeam activated for respiratory distress. NSCU was also consulted for possible admission.

## 2019-06-20 NOTE — H&P ADULT - NSHPPHYSICALEXAM_GEN_ALL_CORE
Neurological Exam:  Mental Status: Orientated to self, date and place.  Attention intact.  No dysarthria, aphasia or neglect.  Knowledge intact.  Registration intact.  Short and long term memory grossly intact.      Cranial Nerves: EOMI, VFF. Slight L facial droop on smile. Tongue, uvula and palate midline.    Motor:   Tone: normal.                  Strength:   5/5 RUE, BLE  0/5 LUE         Dysmeria: None on FTN on R, unable to undergo on L  Tremor: No resting, postural or action tremor.    Sensation: intact to light touch    Deep Tendon Reflexes: 2+ bilateral biceps, triceps, brachioradialis, +1 knee and ankle  Toes equivocal bilaterally

## 2019-06-20 NOTE — ED PROVIDER NOTE - CLINICAL SUMMARY MEDICAL DECISION MAKING FREE TEXT BOX
atypical focal seizure? last known normal 8pm, labs, ekg, CT head, found to be hypoxic to 92 on 4 L, will CTA chest

## 2019-06-20 NOTE — ED ADULT NURSE NOTE - NSIMPLEMENTINTERV_GEN_ALL_ED
Implemented All Fall Risk Interventions:  [place] to call system. Call bell, personal items and telephone within reach. Instruct patient to call for assistance. Room bathroom lighting operational. Non-slip footwear when patient is off stretcher. Physically safe environment: no spills, clutter or unnecessary equipment. Stretcher in lowest position, wheels locked, appropriate side rails in place. Provide visual cue, wrist band, yellow gown, etc. Monitor gait and stability. Monitor for mental status changes and reorient to person, place, and time. Review medications for side effects contributing to fall risk. Reinforce activity limits and safety measures with patient and family.

## 2019-06-20 NOTE — CONSULT NOTE ADULT - SUBJECTIVE AND OBJECTIVE BOX
Patient is a 53y old  Female who presents with a chief complaint of   PAST MEDICAL & SURGICAL HISTORY:  Anxiety  No significant past surgical history    DEB MENDEZ   53y    Female    Review of Systems:   wakes up at night gasping for breath                   All other ROS are negative.    Allergies    No Known Allergies    Intolerances      Weight (kg): 117.9 (06-19-19 @ 23:45)    ICU Vital Signs Last 24 Hrs  T(C): 36.6 (19 Jun 2019 23:45), Max: 36.6 (19 Jun 2019 23:45)  T(F): 97.9 (19 Jun 2019 23:45), Max: 97.9 (19 Jun 2019 23:45)  HR: 114 (20 Jun 2019 03:21) (114 - 148)  BP: 163/132 (20 Jun 2019 03:21) (163/132 - 235/134)  BP(mean): --  ABP: --  ABP(mean): --  RR: 24 (20 Jun 2019 03:21) (20 - 30)  SpO2: 99% (20 Jun 2019 03:21) (93% - 99%)    Physical Examination:    General:  mild resp distress on BIPAP.      HEENT:  +JVD    PULM: bilateral basilar crackles     CVS: s1 s2 tachy     ABD: obese soft NT    EXT: trace edema   pulses in L arm brachial radial ulnar OK    SKIN: warm ricarda      Neuro:  Dense L arm paresis 0/5 noted at 3:45 AM.  No other limb or CN deficit on exam speech is clear.      ABG - ( 20 Jun 2019 03:15 )  pH, Arterial: 7.28  pH, Blood: x     /  pCO2: 46    /  pO2: 134   / HCO3: 20    / Base Excess: -4.5  /  SaO2: 98          LABS:                        15.3   17.88 )-----------( 397      ( 20 Jun 2019 00:17 )             49.9     06-20    136  |  103  |  19  ----------------------------<  154<H>  3.7   |  24  |  1.10    Ca    8.4<L>      20 Jun 2019 00:17    TPro  7.1  /  Alb  3.6  /  TBili  1.7<H>  /  DBili  x   /  AST  24  /  ALT  33  /  AlkPhos  86  06-20      CARDIAC MARKERS ( 20 Jun 2019 00:17 )  <.015 ng/mL / x     / x     / x     / x          CAPILLARY BLOOD GLUCOSE        PT/INR - ( 20 Jun 2019 00:17 )   PT: 14.7 sec;   INR: 1.29 ratio         PTT - ( 20 Jun 2019 00:17 )  PTT:31.7 sec    CULTURES:      Medications:  MEDICATIONS  (STANDING):    MEDICATIONS  (PRN):      RADIOLOGY/IMAGING/ECHO    CXR inc PVM's    POCUS   difficult windows, but LVEF diminished 40%    CTA h&n pending     Assessment/Plan:    53F obese smoker hx anxiety, no other known medical hx, (has not seen a provider in over 5 years), 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  CT non diagnostic.  She developed severe resp distress and was nearly intubated.  ABG revealed hypoxemia and mild hypercapnia after 1.5 mg of ativan were given.     She was placed on BIPAP and resp status improved.        Hypertensive crisis with /134 at one point associated with what appears to be pulmonary edema with resp distress and hypoxemia.      Now with L arm paresis which is acute here in the ED first noticed at 3:45 AM.  D/W Dr Mittal, advised stat CTA H&N and to reach out for a neurological opinion for consideration of thrombolytics.            CRITICAL CARE TIME SPENT: 37 minutes assessing presenting problems of acute illness, which pose high probability of life threatening deterioration or end organ damage/dysfunction, as well as medical decision making including initiating plan of care, reviewing data, reviewing radiologic exams, discussing with multidisciplinary team,  discussing goals of care with patient/family, and writing this note.  Non-inclusive of procedures performed, Patient is a 53y old  Female who presents with a chief complaint of   PAST MEDICAL & SURGICAL HISTORY:  Anxiety  No significant past surgical history    DEB MENDEZ   53y    Female    Review of Systems:   wakes up at night gasping for breath                   All other ROS are negative.    Allergies    No Known Allergies    Intolerances      Weight (kg): 117.9 (06-19-19 @ 23:45)    ICU Vital Signs Last 24 Hrs  T(C): 36.6 (19 Jun 2019 23:45), Max: 36.6 (19 Jun 2019 23:45)  T(F): 97.9 (19 Jun 2019 23:45), Max: 97.9 (19 Jun 2019 23:45)  HR: 114 (20 Jun 2019 03:21) (114 - 148)  BP: 163/132 (20 Jun 2019 03:21) (163/132 - 235/134)  BP(mean): --  ABP: --  ABP(mean): --  RR: 24 (20 Jun 2019 03:21) (20 - 30)  SpO2: 99% (20 Jun 2019 03:21) (93% - 99%)    Physical Examination:    General:  mild resp distress on BIPAP.      HEENT:  +JVD    PULM: bilateral basilar crackles     CVS: s1 s2 tachy     ABD: obese soft NT    EXT: trace edema   pulses in L arm brachial radial ulnar OK    SKIN: warm ricarda      Neuro:  Dense L arm paresis 0/5 noted at 3:45 AM.  No other limb or CN deficit on exam speech is clear.      ABG - ( 20 Jun 2019 03:15 )  pH, Arterial: 7.28  pH, Blood: x     /  pCO2: 46    /  pO2: 134   / HCO3: 20    / Base Excess: -4.5  /  SaO2: 98          LABS:                        15.3   17.88 )-----------( 397      ( 20 Jun 2019 00:17 )             49.9     06-20    136  |  103  |  19  ----------------------------<  154<H>  3.7   |  24  |  1.10    Ca    8.4<L>      20 Jun 2019 00:17    TPro  7.1  /  Alb  3.6  /  TBili  1.7<H>  /  DBili  x   /  AST  24  /  ALT  33  /  AlkPhos  86  06-20      CARDIAC MARKERS ( 20 Jun 2019 00:17 )  <.015 ng/mL / x     / x     / x     / x          CAPILLARY BLOOD GLUCOSE        PT/INR - ( 20 Jun 2019 00:17 )   PT: 14.7 sec;   INR: 1.29 ratio         PTT - ( 20 Jun 2019 00:17 )  PTT:31.7 sec    CULTURES:      Medications:  MEDICATIONS  (STANDING):    MEDICATIONS  (PRN):      RADIOLOGY/IMAGING/ECHO    CXR inc PVM's    POCUS   difficult windows, but LVEF diminished 40%    CTA h&n pending     Assessment/Plan:    53F obese smoker hx anxiety, no other known medical hx, (has not seen a provider in over 5 years), 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  CT non diagnostic.  She developed severe resp distress and was nearly intubated.  ABG revealed hypoxemia and mild hypercapnia after 1.5 mg of ativan were given.     She was placed on BIPAP and resp status improved.        Hypertensive crisis with /134 at one point associated with what appears to be pulmonary edema with resp distress and hypoxemia.      Now with L arm paresis which is acute here in the ED first noticed at 3:45 AM.  D/W Dr Mittal, advised stat CTA H&N and to reach out for a neurological opinion for consideration of thrombolytics.  R/O arterial occlusion             CRITICAL CARE TIME SPENT: 37 minutes assessing presenting problems of acute illness, which pose high probability of life threatening deterioration or end organ damage/dysfunction, as well as medical decision making including initiating plan of care, reviewing data, reviewing radiologic exams, discussing with multidisciplinary team,  discussing goals of care with patient/family, and writing this note.  Non-inclusive of procedures performed, Patient is a 53y old  Female who presents with a chief complaint of   PAST MEDICAL & SURGICAL HISTORY:  Anxiety  No significant past surgical history    DEB MENDEZ   53y    Female    Review of Systems:   wakes up at night gasping for breath                   All other ROS are negative.    Allergies    No Known Allergies    Intolerances      Weight (kg): 117.9 (06-19-19 @ 23:45)    ICU Vital Signs Last 24 Hrs  T(C): 36.6 (19 Jun 2019 23:45), Max: 36.6 (19 Jun 2019 23:45)  T(F): 97.9 (19 Jun 2019 23:45), Max: 97.9 (19 Jun 2019 23:45)  HR: 114 (20 Jun 2019 03:21) (114 - 148)  BP: 163/132 (20 Jun 2019 03:21) (163/132 - 235/134)  BP(mean): --  ABP: --  ABP(mean): --  RR: 24 (20 Jun 2019 03:21) (20 - 30)  SpO2: 99% (20 Jun 2019 03:21) (93% - 99%)    Physical Examination:    General:  mild resp distress on BIPAP.      HEENT:  +JVD    PULM: bilateral basilar crackles     CVS: s1 s2 tachy     ABD: obese soft NT    EXT: trace edema   pulses in L arm brachial radial ulnar OK    SKIN: warm ricarda      Neuro:  Dense L arm paresis 0/5 noted at 3:45 AM.  No other limb or CN deficit on exam speech is clear.      ABG - ( 20 Jun 2019 03:15 )  pH, Arterial: 7.28  pH, Blood: x     /  pCO2: 46    /  pO2: 134   / HCO3: 20    / Base Excess: -4.5  /  SaO2: 98          LABS:                        15.3   17.88 )-----------( 397      ( 20 Jun 2019 00:17 )             49.9     06-20    136  |  103  |  19  ----------------------------<  154<H>  3.7   |  24  |  1.10    Ca    8.4<L>      20 Jun 2019 00:17    TPro  7.1  /  Alb  3.6  /  TBili  1.7<H>  /  DBili  x   /  AST  24  /  ALT  33  /  AlkPhos  86  06-20      CARDIAC MARKERS ( 20 Jun 2019 00:17 )  <.015 ng/mL / x     / x     / x     / x          CAPILLARY BLOOD GLUCOSE        PT/INR - ( 20 Jun 2019 00:17 )   PT: 14.7 sec;   INR: 1.29 ratio         PTT - ( 20 Jun 2019 00:17 )  PTT:31.7 sec    CULTURES:      Medications:  MEDICATIONS  (STANDING):    MEDICATIONS  (PRN):      RADIOLOGY/IMAGING/ECHO    CXR inc PVM's    POCUS   difficult windows, but LVEF diminished 40%    CTA h&n pending     Assessment/Plan:    53F obese smoker hx anxiety, no other known medical hx, (has not seen a provider in over 5 years), 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  CT non diagnostic.  She developed severe resp distress and was nearly intubated.  ABG revealed hypoxemia and mild hypercapnia after 1.5 mg of ativan were given.     She was placed on BIPAP and resp status improved.        Hypertensive crisis with /134 at one point associated with what appears to be pulmonary edema with resp distress and hypoxemia.      Now with dense  L arm paresis which is acute here in the ED first noticed at 3:45 AM.  D/W Dr Mittal, advised stat CTA H&N and to reach out for a neurological opinion for consideration of thrombolytics.  R/O arterial occlusion.               CRITICAL CARE TIME SPENT: 37 minutes assessing presenting problems of acute illness, which pose high probability of life threatening deterioration or end organ damage/dysfunction, as well as medical decision making including initiating plan of care, reviewing data, reviewing radiologic exams, discussing with multidisciplinary team,  discussing goals of care with patient/family, and writing this note.  Non-inclusive of procedures performed,       Addendum.  report back,       from: CT Angio Head w/ IV Cont (06.20.19 @ 04:19) >  IMPRESSION:     CT HEAD: Acute right middle cerebral artery infarct involving the right   precentral gyrus and centrum semiovale. ASPECTS score 8 out of 10. No   hemorrhage or midline shift.     CTA HEAD AND NECK:   -Severe stenosis at the origin of the right internal carotid artery with   filling defect in the anterior cavernous segment suspicious for critical   stenosis/focal occlusion.    -Moderate to severe stenosis of the proximal cervical segment and mild   stenosis of the posterior cavernous segment of the left internal carotid   artery.    Consider conventional angiography for improved characterization of degree   of stenoses and hemodynamics.      Patient with R ICA filling defect  Dr Pennington the on call stroke neurologist has told Dr Mittal, that this patient is not a candidate for catheter based therapies.    I discussed with Dr Whitlock E-ICU at 5:15 he will reach out to the stroke neurologist Dr Bowie.           Now at 5:30 AM  patient accepted to [place] for consideration neuro interventional procedures.

## 2019-06-20 NOTE — CONSULT NOTE ADULT - ASSESSMENT
52y/o F RH  PMH Anxiety, 40 pk/yr smoking hx(still active), does not get regular checkups presented to [facility] w/ L arm numbness, spasms starting 2000 6/19. LKW 6/19 2000. CT/CTA H/N done w/ severe stenosis at the origin of the right internal carotid artery with filling defect in the anterior cavernous segment suspicious for critical stenosis/focal occlusion as well as moderate to severe stenosis of the proximal cervical segment and mild stenosis of the posterior cavernous segment of the left internal carotid artery.   ·	PAtient better. Course has been complicated by pulmonary edema which improved with lasix. Likely related to sympathetic surge and hypertension. Continue lasix for now.   ·	Check echo  ·	Permissive HTN per neuro. PRN BP medications for significant elevations in BP.   ·	Patient with carotid disease will discuss with vascular cardiology.   ·	Statin.   ·	Question need for ILR if CVA thought related to carotid disease.     =======================================================================  Lito Moise MD FAC    Please page [phone] with questions  On nights and weekend please call the office [phone].  For all Cardiology service contact information, go to [email or web address] and use [email or web address]" to login.

## 2019-06-20 NOTE — ED PROVIDER NOTE - OBJECTIVE STATEMENT
52 yo female hx of anxiety, no pmd c/o left arm numbness, spasms since 8pm tonight radiating to her neck.  No chest pain, no shortness of breath.  No fever/chills.  No slurred speech, no facial droop. Admits to using laxatives for constipation, not sure if she is dehydrated. 54 yo female hx of anxiety, no pmd c/o left arm numbness, spasms since 8pm tonight radiating to her neck.  No chest pain, no shortness of breath.  No fever/chills.  No slurred speech, no facial droop. No headache. Admits to using laxatives for constipation, not sure if she is dehydrated.

## 2019-06-20 NOTE — ED PROVIDER NOTE - OBTAINED AND REVIEWED OLD RECORDS MULTI-SELECT OPTIONS
----- Message from Abhishek Gimenez DO sent at 5/12/2017  9:58 AM CDT -----  Reassure patient that her cardiac enzymes are negative. Suggesting against evidence or concern for a recent \"silent\" myocardial infarction. Proceed with the stress test.   Prior Hospital/ED Visits

## 2019-06-20 NOTE — OCCUPATIONAL THERAPY INITIAL EVALUATION ADULT - PERTINENT HX OF CURRENT PROBLEM, REHAB EVAL
53F  PMH Anxiety, 40 pk/yr smoking hx(still active), does not get regular checkups presented to [facility] w/ L arm numbness, spasms starting 2000 6/19. LKW 6/19 2000. CT/CTA H/N done w/ severe stenosis at the origin of the right internal carotid artery with filling defect in the anterior cavernous segment suspicious for critical stenosis/focal occlusion as well as moderate to severe stenosis of the proximal cervical segment

## 2019-06-20 NOTE — H&P ADULT - ASSESSMENT
54y/o F PMH Anxiety tx from [facility], CTA H/N done w/ severe stenosis at the origin of the right internal carotid artery with filling defect in the anterior cavernous segment suspicious for critical stenosis/focal occlusion as well as moderate to severe stenosis of the proximal cervical segment and mild stenosis of the posterior cavernous segment of the left internal carotid artery. CT perfusion pending. L arm plegia, L facial droop likely 2/2 R brain infarction likely 2/2 symptomatic R ICA stenosis. Would recommend additional head imaging to characterize R brain lesion as well as stroke risk factor management. TPA not given due to outside window.     Recs:  Nsx eval for possible angio/stent?  MRI brain without contrast   Aspirin for secondary stroke prevention at this time. Atorvastatin 80, titrate the dose according to LDL.   Plavix 75mg for 3 months as per [facility]  TTE with bubble study and telemetry to look for a cardiac source of embolism.   Will defer decision to do KEAGAN to attending in AM  Will defer decision to start FLAME to attending in AM  DVT prophylaxis, Neurochecks  Permissive HTN up to 220/120 mmHg for first 24 hours after the symptom onset followed by gradual normotension.   HbA1C and LDL.   PT/OT/Speech and swallow/safety eval. 52y/o F PMH Anxiety tx from [facility], CTA H/N done w/ severe stenosis at the origin of the right internal carotid artery with filling defect in the anterior cavernous segment suspicious for critical stenosis/focal occlusion as well as moderate to severe stenosis of the proximal cervical segment and mild stenosis of the posterior cavernous segment of the left internal carotid artery. CT perfusion pending. L arm plegia, L facial droop likely 2/2 R brain infarction likely 2/2 symptomatic R ICA stenosis. Would recommend additional head imaging to characterize R brain lesion as well as stroke risk factor management. TPA not given due to outside window.     Recs:  Nsx eval for possible angio/stent?  MRI brain without contrast   Heparin drip low dose(40-60 no bolus)  Goal SBP <180  TTE with bubble study and telemetry to look for a cardiac source of embolism.   Will defer decision to do KEAGAN to attending in AM  Will defer decision to start FLAME to attending in AM  DVT prophylaxis, Neurochecks  HbA1C and LDL.   PT/OT/Speech and swallow/safety eval. 54y/o F PMH Anxiety tx from [facility], CTA H/N done w/ severe stenosis at the origin of the right internal carotid artery with filling defect in the anterior cavernous segment suspicious for critical stenosis/focal occlusion as well as moderate to severe stenosis of the proximal cervical segment and mild stenosis of the posterior cavernous segment of the left internal carotid artery. CT perfusion pending. L arm plegia, L facial droop likely 2/2 R brain infarction likely 2/2 symptomatic R ICA stenosis. Would recommend additional head imaging to characterize R brain lesion as well as stroke risk factor management. TPA not given due to outside window.     Recs:  Nsx eval for possible angio/stent?  MRI brain without contrast   Heparin drip low dose(40-60 no bolus)  Goal SBP <180  TTE with bubble study and telemetry to look for a cardiac source of embolism.   Will defer decision to start FLAME to attending in AM  DVT prophylaxis, Neurochecks  HbA1C and LDL.   PT/OT/Speech and swallow/safety eval.

## 2019-06-20 NOTE — PHYSICAL THERAPY INITIAL EVALUATION ADULT - CRITERIA FOR SKILLED THERAPEUTIC INTERVENTIONS
therapy frequency/functional limitations in following categories/impairments found/predicted duration of therapy intervention/anticipated discharge recommendation/risk reduction/prevention/rehab potential

## 2019-06-20 NOTE — OCCUPATIONAL THERAPY INITIAL EVALUATION ADULT - ADDITIONAL COMMENTS
and mild stenosis of the posterior cavernous segment of the left internal carotid artery. Dense L arm paresis noted at 0345AM, transferred to [facility] for potential angio and further management of condition. On initial assessment in ED, NIHSS 6. CT Angio (6/20):CT HEAD: Acute right middle cerebral artery infarct involving the right precentral gyrus and centrum semiovale. ASPECTS score 8 out of 10. No hemorrhage or midline shift. CTA HEAD AND NECK: Severe stenosis at the origin of the right internal carotid artery with filling defect in the anterior cavernous segment suspicious for critical stenosis/focal occlusion.  Moderate to severe stenosis of the proximal cervical segment and mild stenosis of the posterior cavernous segment of the left internal carotid artery. Consider conventional angiography for improved characterization of degree of stenoses and hemodynamics.

## 2019-06-20 NOTE — CONSULT NOTE ADULT - SUBJECTIVE AND OBJECTIVE BOX
p (1480)     HPI:  52y/o F RH  PMH Anxiety, 40 pk/yr smoking hx(still active), does not get regular checkups presented to [facility] w/ L arm numbness, spasms starting . LKW 2000. CT/CTA H/N done w/ severe stenosis at the origin of the right internal carotid artery with filling defect in the anterior cavernous segment suspicious for critical stenosis/focal occlusion as well as moderate to severe stenosis of the proximal cervical segment and mild stenosis of the posterior cavernous segment of the left internal carotid artery. Dense L arm paresis noted at 0345AM, transferred to [facility] for potential angio and further management of condition. On initial assessment in ED, NIHSS 6. (2019 07:01)    PAST MEDICAL HISTORY   Anxiety  No pertinent past medical history    PAST SURGICAL HISTORY   No significant past surgical history        MEDICATIONS:  Antibiotics:    Neuro:  acetaminophen   Tablet .. 650 milliGRAM(s) Oral every 6 hours PRN  levETIRAcetam  IVPB 1000 milliGRAM(s) IV Intermittent every 12 hours    Anticoagulation:  heparin  Infusion 1550 Unit(s)/Hr IV Continuous <Continuous>    Other:  atorvastatin 80 milliGRAM(s) Oral at bedtime  hydrALAZINE Injectable 5 milliGRAM(s) IV Push every 6 hours PRN  labetalol Injectable 10 milliGRAM(s) IV Push once PRN      SOCIAL HISTORY:   Occupation:   Marital Status:     FAMILY HISTORY:      REVIEW OF SYSTEMS:  Check here if all are normal other than Neurological [x]  General:  Eyes:  ENT:  Cardiac:  Respiratory:  GI:  Musculoskeletal:   Skin:  Neurologic:   Psychiatric:     PHYSICAL EXAMINATION:   T(C): 36.5 (19 @ 16:24), Max: 37.1 (19 @ 04:32)  HR: 104 (19 @ 22:00) (83 - 148)  BP: 165/97 (19 @ 22:00) (153/82 - 235/134)  RR: 18 (19 @ 22:00) (16 - 30)  SpO2: 99% (19 @ 22:00) (93% - 100%)  Wt(kg): --  Weight (kg): 116.3 ( @ 08:56)    General Examination:     Neurologic Examination:           Cranial Nerves: EOMI, VFF. Slight L facial droop on smile. Tongue, uvula and palate midline.    Motor:   Tone: normal.                  Strength:   5/5 RUE, BLE  0/5 LUE         Dysmeria: None on FTN on R, unable to undergo on L  Tremor: No resting, postural or action tremor.    	Sensation: intact to light touch    LABS:                        15.9   16.9  )-----------( 397      ( 2019 10:15 )             51.5         138  |  99  |  15  ----------------------------<  167<H>  4.5   |  21<L>  |  1.03    Ca    9.2      2019 10:15    TPro  7.1  /  Alb  3.9  /  TBili  1.9<H>  /  DBili  x   /  AST  18  /  ALT  26  /  AlkPhos  76      PT/INR - ( 2019 16:28 )   PT: 15.8 sec;   INR: 1.36 ratio         PTT - ( 2019 22:46 )  PTT:39.3 sec  Urinalysis Basic - ( 2019 19:13 )    Color: Light Yellow / Appearance: Clear / S.016 / pH: x  Gluc: x / Ketone: Negative  / Bili: Negative / Urobili: Negative   Blood: x / Protein: 30 mg/dL / Nitrite: Negative   Leuk Esterase: Negative / RBC: 1 /hpf / WBC 0 /HPF   Sq Epi: x / Non Sq Epi: 0 /hpf / Bacteria: Negative        RADIOLOGY & ADDITIONAL STUDIES:  CT Head No Cont (19 @ 07:27) >  COMPARISON STUDY: CTA head 2019    Unchanged lucency of the right centrum semiovale and right precentral   gyrus corresponding to acute to subacute right middle cerebral artery   infarct.    There is no CT evidence of acute intracranial hemorrhage, extra-axial   collection, midline shift, central herniation or hydrocephalus.     The visualized paranasal sinuses are clear. The mastoid air cells and   middle ear cavities are clear.    The soft tissues of the scalp are unremarkable. The calvarium is intact.    IMPRESSION:    No interval change in right middle cerebral artery infarct involving the   right precentral gyrus and centrum semiovale. No hemorrhage. ASPECTS   score 8 out of 10.      CT Angio Head w/ IV Cont (06.20.19 @ 04:19) >  NONCONTRAST HEAD CT:  Motion degraded, however there is loss of gray-white matter   differentiation in the right frontal lobe involving the right precentral   gyrus and centrum semiovale with sulcal effacement, indicative of acute   to subacute infarct. There is no hemorrhage. The ventricles demonstrate   normal size and configuration.    The paranasal sinuses and mastoids are clear. The calvarium is intact.     CTA HEAD AND NECK:  Evaluation limited due to quantum mottle, particularly of the neck.    Aortic arch: Not imaged. Bovine anatomic variant with common origin of   the brachiocephalic and left common carotid arteries.  Brachiocephalic artery: Patent.  Right subclavian artery: Patent.  Left subclavian artery: Patent.    Right vertebral artery: Patent.  Left vertebral artery: Patent.    Right common carotid artery: Patent.  Right external carotid artery: Patent.  Right internal carotid artery: Severe stenosis at the origin. Calcified   plaque in the proximal cervical segment without flow-limiting stenosis.   Filling defect in the anterior cavernous segment (5-332) suspicious for   thrombus causing critical stenosis/focal occlusion with distally   preserved patency. Calcified plaque in the cavernous and clinoid segments.    Left common carotid artery: Patent.  Left external carotid artery: Patent.  Left internal carotid artery: Patent. Moderate to severe stenosis of the   proximal cervical segment and mild stenosis of the posterior cavernous   segment. Calcified plaque in the cavernous and clinoid segments.    Anterior circulation:     Right anterior cerebral artery: Patent.    Right middle cerebral artery: Patent.      Anterior communicating artery complex: Within normal limits.    Left anterior cerebral artery: Patent.    Left middle cerebral artery: Patent.    Posterior circulation:    Posterior and anterior inferior cerebellar arteries: Patent.      Basilar artery: Patent.    Superior cerebellar arteries: Patent.    Right posterior cerebral artery: Patent.    Left posterior cerebral artery: Patent.      Posterior communicating arteries: Not well visualized bilaterally.    Veins: The visualized dural venous sinuses are patent.     Vascular measurements were made according to NASCET criteria.    IMPRESSION:     CT HEAD: Acute right middle cerebral artery infarct involving the right   precentral gyrus and centrum semiovale. ASPECTS score 8 out of 10. No   hemorrhage or midline shift.     CTA HEAD AND NECK:   -Severe stenosis at the origin of the right internal carotid artery with   filling defect in the anterior cavernous segment suspicious for critical   stenosis/focal occlusion.    -Moderate to severe stenosis of the proximal cervical segment and mild   stenosis of the posterior cavernous segment of the left internal carotid   artery.

## 2019-06-20 NOTE — RAPID RESPONSE TEAM SUMMARY - [id number]_GEN_ALL_CORE
Remained in Stroke Unit but primary team with reach out to NSICU about transfer. Will plan for continuous EEG and possible repeat CT Head

## 2019-06-20 NOTE — ED PROVIDER NOTE - OBJECTIVE STATEMENT
52 y/o female PMHx obese smoker, anxiety (has not seen a provider in over 5 years), on no meds transferred from [place] for stroke seen on CT. Patient presented to [place] with episode of twitching and left arm numbness that started at 23:00 yesterday. CT head revealed Acute right middle cerebral artery infarct involving the right and CT neck Severe stenosis at the origin of the right internal carotid artery with filling defect in the anterior cavernous segment suspicious for critical  stenosis/focal occlusion. Moderate to severe stenosis of the proximal cervical segment and mild  stenosis of the posterior cavernous segment of the left internal carotid artery. According to EMS patient was hyperventilating in severe resp distress in which ABG revealed hypoxemia. Placed on bipap and received ativan 1.5mg IV. Patient was not tpa candidate as she was outside time frame. She developed severe resp distress and was nearly intubated.  ABG revealed hypoxemia and mild hypercapnia after 1.5 mg of ativan were given. L arm paresis was seen acutely at 3:45 AM according to critical care [facility] note. 52 y/o female PMHx obese smoker, anxiety (has not seen a provider in over 5 years), on no meds transferred from [place] for stroke seen on CT. Patient presented to [place] with episode of twitching and left arm numbness that started at 20:00 yesterday. CT head revealed Acute right middle cerebral artery infarct involving the right and CT neck Severe stenosis at the origin of the right internal carotid artery with filling defect in the anterior cavernous segment suspicious for critical  stenosis/focal occlusion. Moderate to severe stenosis of the proximal cervical segment and mild  stenosis of the posterior cavernous segment of the left internal carotid artery. According to EMS patient was hyperventilating in severe resp distress in which ABG revealed hypoxemia. Placed on bipap and received ativan 1.5mg IV. Patient was not tpa candidate as she was outside time frame. She developed severe resp distress (described as "hyperventilating" via transfer center call and was nearly intubated.  ABG revealed hypoxemia and mild hypercapnia after 1.5 mg of ativan were given. L arm paresis was seen acutely at 3:45 AM according to critical care [place] note.

## 2019-06-20 NOTE — CONSULT NOTE ADULT - SUBJECTIVE AND OBJECTIVE BOX
Patient seen and evaluated @ [address] [place] [address]  Chief Complaint: Arm Pain    HPI:     53F w/ PMH Anxiety, active smoker with a 40 pack year history that initially presented to [facility] with L arm numbness and spams.     She was transferred to [facility] where she underwent a stroke evaluation and a CTA of her head and neck was performed revealing an acute right middle cerebral artery infarct w/o hemorrhage or midline shift. It was also noted that she has severe stenosis at at the origin of the right internal carotid artery with filling defects in the anterior cavernous segment suspicious for critical stenosis/focal occlusion. There was also moderate to severe stenosis of the left internal carotid.  She was evaluated on initial assessment in ED with a NIHSS 6. Exam + for L arm plegia, L facial droop. She was also noticed to have continuous twitching of left arm and face, drop in oxygen saturation for which she was put on BiPAP in the MICU/neurological ICU consultation was obtained. She was hypertensive to 235/134 w/ hypoxemia and mild hypercapnia. She was placed on BiPAP w/ improvement. She was deemed not to be a candidate for catheter base therapies and she was outside the window for IV tPA.     Patient was subsequently admitted to the stroke unit.     NeuroSx  consulted for revascularization of carotid stenosis.     Earlier today patient had a RRT for seizure like activity and was started on Keppra w/ continuous EEG monitoring. She was also started on heparin gtt.      PMH:   Anxiety  No pertinent past medical history    PSH:   No significant past surgical history    Medications:   atorvastatin 80 milliGRAM(s) Oral at bedtime  heparin  Infusion 1200 Unit(s)/Hr IV Continuous <Continuous>  levETIRAcetam  IVPB 1000 milliGRAM(s) IV Intermittent every 12 hours  nicotine - 21 mG/24Hr(s) Patch 1 patch Transdermal daily    Allergies:  No Known Allergies    FAMILY HISTORY:    Social History:  Smoking:  Alcohol:  Drugs:    Review of Systems:  REVIEW OF SYSTEMS:    CONSTITUTIONAL: No weakness, fevers or chills  EYES/ENT: No visual changes;  No dysphagia  NECK: No pain or stiffness  RESPIRATORY: No cough, wheezing, hemoptysis; No shortness of breath  CARDIOVASCULAR: No chest pain or palpitations; No lower extremity edema  GASTROINTESTINAL: No abdominal or epigastric pain. No nausea, vomiting, or hematemesis; No diarrhea or constipation. No melena or hematochezia.  BACK: No back pain  GENITOURINARY: No dysuria, frequency or hematuria  NEUROLOGICAL: No numbness or weakness  SKIN: No itching, burning, rashes, or lesions   All other review of systems is negative unless indicated above.  [ ] 10 point review of systems is otherwise negative except as mentioned above            [ ]Unable to obtain    Physical Exam:  T(C): 36.7 (06-20-19 @ 07:30), Max: 37.1 (06-20-19 @ 04:32)  HR: 90 (06-20-19 @ 12:05) (83 - 148)  BP: 153/82 (06-20-19 @ 12:00) (153/82 - 235/134)  RR: 16 (06-20-19 @ 12:00) (16 - 30)  SpO2: 100% (06-20-19 @ 12:05) (93% - 100%)  Wt(kg): --  GENERAL: No acute distress, well-developed  HEAD:  Atraumatic, Normocephalic  ENT: EOMI, PERRLA, conjunctiva and sclera clear, Neck supple, No JVD, moist mucosa  CHEST/LUNG: Clear to auscultation bilaterally; No wheeze, equal breath sounds bilaterally   BACK: No spinal tenderness  HEART: Regular rate and rhythm; No murmurs, rubs, or gallops  ABDOMEN: Soft, Nontender, Nondistended; Bowel sounds present  EXTREMITIES:  No clubbing, cyanosis, or edema  PSYCH: Nl behavior, nl affect  NEUROLOGY: AAOx3, non-focal, cranial nerves intact  SKIN: Normal color, No rashes or lesions      Daily     Daily     Cardiovascular Diagnostic Testing:  ECG: Sinus Tach w/ Left Josh-block    Echo:    Stress Testing:    Cath:    Interpretation of Telemetry:    Imaging:    EXAM:  CT BRAIN                          PROCEDURE DATE:  06/20/2019      INTERPRETATION:    CLINICAL INDICATION: Change in mental status, CVA    5mm axial sections of the brain were obtained from base to vertex,   without the intravenous administration of contrast material. Coronal and   sagittal computer generated reconstructed views are available.    Comparison is made with the prior CT of 6:56 AM.    No significant interval change is identified.    There is subtle lucency in the right parietal cortex in the midportion of   the right middle cerebral artery distribution. There is no hemorrhage.    The fourth, third and lateral ventricles are normal size and position.    IMPRESSION: No significant change since 6:56 AM. Subtle lucency in the   right parietal region in a portion of the right middle cerebral artery   distribution. No hemorrhage.    DEB MACE M.D., ATTENDING RADIOLOGIST  This document has been electronically signed. Jun 20 2019 12:26PM        EXAM:  CT ANGIO NECK (W)AW IC                          EXAM:  CT ANGIO BRAIN (W)AW IC                            PROCEDURE DATE:  06/20/2019          INTERPRETATION:  CLINICAL INFORMATION: Left arm weakness.    TECHNIQUE: CT angiogram of the head and neck were performed. Noncontrast   images of the head were obtained followed by helically acquired   postcontrast images from the aortic arch to the vertex with axial,   coronal and sagittal reformats and MIP images. 95 cc Omnipaque-350 were   administered intravenously and 5 cc were discarded. 3D/MIP   reconstructions were performed on a separate workstation.    COMPARISON: CT brain 6/20/2019.     FINDINGS:   NONCONTRAST HEAD CT:  Motion degraded, however there is loss of gray-white matter   differentiation in the right frontal lobe involving the right precentral   gyrus and centrum semiovale with sulcal effacement, indicative of acute   to subacute infarct. There is no hemorrhage. The ventricles demonstrate   normal size and configuration.    The paranasal sinuses and mastoids are clear. The calvarium is intact.     CTA HEAD AND NECK:  Evaluation limited due to quantum mottle, particularly of the neck.    Aortic arch: Not imaged. Bovine anatomic variant with common origin of   the brachiocephalic and left common carotid arteries.  Brachiocephalic artery: Patent.  Right subclavian artery: Patent.  Left subclavian artery: Patent.    Right vertebral artery: Patent.  Left vertebral artery: Patent.    Right common carotid artery: Patent.  Right external carotid artery: Patent.  Right internal carotid artery: Severe stenosis at the origin. Calcified   plaque in the proximal cervical segment without flow-limiting stenosis.   Filling defect in the anterior cavernous segment (5-332) suspicious for   thrombus causing critical stenosis/focal occlusion with distally   preserved patency. Calcified plaque in the cavernous and clinoid segments.    Left common carotid artery: Patent.  Left external carotid artery: Patent.  Left internal carotid artery: Patent. Moderate to severe stenosis of the   proximal cervical segment and mild stenosis of the posterior cavernous   segment. Calcified plaque in the cavernous and clinoid segments.    Anterior circulation:     Right anterior cerebral artery: Patent.    Right middle cerebral artery: Patent.      Anterior communicating artery complex: Within normal limits.    Left anterior cerebral artery: Patent.    Left middle cerebral artery: Patent.    Posterior circulation:    Posterior and anterior inferior cerebellar arteries: Patent.      Basilar artery: Patent.    Superior cerebellar arteries: Patent.    Right posterior cerebral artery: Patent.    Left posterior cerebral artery: Patent.      Posterior communicating arteries: Not well visualized bilaterally.    Veins: The visualized dural venous sinuses are patent.     Vascular measurements were made according to NASCET criteria.    IMPRESSION:     CT HEAD: Acute right middle cerebral artery infarct involving the right   precentral gyrus and centrum semiovale. ASPECTS score 8 out of 10. No   hemorrhage or midline shift.     CTA HEAD AND NECK:   -Severe stenosis at the origin of the right internal carotid artery with   filling defect in the anterior cavernous segment suspicious for critical   stenosis/focal occlusion.    -Moderate to severe stenosis of the proximal cervical segment and mild   stenosis of the posterior cavernous segment of the left internal carotid   artery.    Consider conventional angiography for improved characterization of degree   of stenoses and hemodynamics.    Call Back:  I discussed this case with Dr. Mittal at 4:35 AM on 6/20/2019.    Hospital policies for call back including read back policy were followed.   The verbal communication call back supplements this written report.             Labs:                        15.9   16.9  )-----------( 397      ( 20 Jun 2019 10:15 )             51.5     06-20    138  |  99  |  15  ----------------------------<  167<H>  4.5   |  21<L>  |  1.03    Ca    9.2      20 Jun 2019 10:15    TPro  7.1  /  Alb  3.9  /  TBili  1.9<H>  /  DBili  x   /  AST  18  /  ALT  26  /  AlkPhos  76  06-20    PT/INR - ( 20 Jun 2019 10:15 )   PT: 15.4 sec;   INR: 1.33 ratio         PTT - ( 20 Jun 2019 10:15 )  PTT:25.2 sec  CARDIAC MARKERS ( 20 Jun 2019 00:17 )  <.015 ng/mL / x     / x     / x     / x          Serum Pro-Brain Natriuretic Peptide: 2385 pg/mL (06-20 @ 00:17) Patient seen and evaluated @ [address] [place] [address]  Chief Complaint: Arm Pain    HPI:     53F w/ PMH Anxiety, active smoker with a 40 pack year history that initially presented to [facility] with L arm numbness and spams.     She was transferred to [facility] where she underwent a stroke evaluation and a CTA of her head and neck was performed revealing an acute right middle cerebral artery infarct w/o hemorrhage or midline shift. It was also noted that she has severe stenosis at at the origin of the right internal carotid artery with filling defects in the anterior cavernous segment suspicious for critical stenosis/focal occlusion. There was also moderate to severe stenosis of the left internal carotid.  She was evaluated on initial assessment in ED with a NIHSS 6. Exam + for L arm plegia, L facial droop. She was also noticed to have continuous twitching of left arm and face, drop in oxygen saturation for which she was put on BiPAP in the MICU/neurological ICU consultation was obtained. She was hypertensive to 235/134 w/ hypoxemia and mild hypercapnia. She was placed on BiPAP w/ improvement. She was deemed not to be a candidate for catheter base therapies and she was outside the window for IV tPA.     Patient was subsequently admitted to the stroke unit.     NeuroSx  consulted for revascularization of carotid stenosis.     Earlier today patient had a RRT for seizure like activity and was started on Keppra w/ continuous EEG monitoring. She was also started on heparin gtt for concern that thrombus may be visible on imaging.       Patient currently improving. She has been transitioned to high-flow O2 and undergoing EEG.    PMH:   Anxiety  No pertinent past medical history    PSH:   No significant past surgical history    Medications:   atorvastatin 80 milliGRAM(s) Oral at bedtime  heparin  Infusion 1200 Unit(s)/Hr IV Continuous <Continuous>  levETIRAcetam  IVPB 1000 milliGRAM(s) IV Intermittent every 12 hours  nicotine - 21 mG/24Hr(s) Patch 1 patch Transdermal daily    Allergies:  No Known Allergies    FAMILY HISTORY: Family history of CAD     Social History:  Smoking: See HPI  Alcohol: No  Drugs: No    Review of Systems:  REVIEW OF SYSTEMS:    CONSTITUTIONAL: No weakness, fevers or chills  EYES/ENT: No visual changes;  No dysphagia  NECK: No pain or stiffness  RESPIRATORY: + SOB  CARDIOVASCULAR: No chest pain or palpitations; No lower extremity edema  GASTROINTESTINAL: No abdominal or epigastric pain. No nausea, vomiting, or hematemesis; No diarrhea or constipation. No melena or hematochezia.  BACK: No back pain  GENITOURINARY: No dysuria, frequency or hematuria  NEUROLOGICAL: + numbness or weakness  SKIN: No itching, burning, rashes, or lesions   All other review of systems is negative unless indicated above.  [x ] 10 point review of systems is otherwise negative except as mentioned above            []Unable to obtain    Physical Exam:  T(C): 36.7 (06-20-19 @ 07:30), Max: 37.1 (06-20-19 @ 04:32)  HR: 90 (06-20-19 @ 12:05) (83 - 148)  BP: 153/82 (06-20-19 @ 12:00) (153/82 - 235/134)  RR: 16 (06-20-19 @ 12:00) (16 - 30)  SpO2: 100% (06-20-19 @ 12:05) (93% - 100%)  Wt(kg): --  GENERAL: No acute distress + facial droop  HEAD:  Atraumatic, Normocephalic  ENT: Neck Supple / unable to evaluate for JVD  CHEST/LUNG: b/l diffuse crackles  BACK: No spinal tenderness  HEART: + S1s2  ABDOMEN: Soft, Nontender, Nondistended; Bowel sounds present  EXTREMITIES:  No clubbing, cyanosis, or edema  PSYCH: Nl behavior, nl affect  NEUROLOGY: AAOx3, L UE hemiplegia  SKIN: Normal color, No rashes or lesions      Daily     Daily     Cardiovascular Diagnostic Testing:  ECG: Sinus Tach w/ Left Josh-block    Echo:    Stress Testing:    Cath:    Interpretation of Telemetry: SR    Imaging:    EXAM:  CT BRAIN                          PROCEDURE DATE:  06/20/2019      INTERPRETATION:    CLINICAL INDICATION: Change in mental status, CVA    5mm axial sections of the brain were obtained from base to vertex,   without the intravenous administration of contrast material. Coronal and   sagittal computer generated reconstructed views are available.    Comparison is made with the prior CT of 6:56 AM.    No significant interval change is identified.    There is subtle lucency in the right parietal cortex in the midportion of   the right middle cerebral artery distribution. There is no hemorrhage.    The fourth, third and lateral ventricles are normal size and position.    IMPRESSION: No significant change since 6:56 AM. Subtle lucency in the   right parietal region in a portion of the right middle cerebral artery   distribution. No hemorrhage.    DEB MACE M.D., ATTENDING RADIOLOGIST  This document has been electronically signed. Jun 20 2019 12:26PM        EXAM:  CT ANGIO NECK (W)AW IC                          EXAM:  CT ANGIO BRAIN (W)AW IC                            PROCEDURE DATE:  06/20/2019          INTERPRETATION:  CLINICAL INFORMATION: Left arm weakness.    TECHNIQUE: CT angiogram of the head and neck were performed. Noncontrast   images of the head were obtained followed by helically acquired   postcontrast images from the aortic arch to the vertex with axial,   coronal and sagittal reformats and MIP images. 95 cc Omnipaque-350 were   administered intravenously and 5 cc were discarded. 3D/MIP   reconstructions were performed on a separate workstation.    COMPARISON: CT brain 6/20/2019.     FINDINGS:   NONCONTRAST HEAD CT:  Motion degraded, however there is loss of gray-white matter   differentiation in the right frontal lobe involving the right precentral   gyrus and centrum semiovale with sulcal effacement, indicative of acute   to subacute infarct. There is no hemorrhage. The ventricles demonstrate   normal size and configuration.    The paranasal sinuses and mastoids are clear. The calvarium is intact.     CTA HEAD AND NECK:  Evaluation limited due to quantum mottle, particularly of the neck.    Aortic arch: Not imaged. Bovine anatomic variant with common origin of   the brachiocephalic and left common carotid arteries.  Brachiocephalic artery: Patent.  Right subclavian artery: Patent.  Left subclavian artery: Patent.    Right vertebral artery: Patent.  Left vertebral artery: Patent.    Right common carotid artery: Patent.  Right external carotid artery: Patent.  Right internal carotid artery: Severe stenosis at the origin. Calcified   plaque in the proximal cervical segment without flow-limiting stenosis.   Filling defect in the anterior cavernous segment (5-332) suspicious for   thrombus causing critical stenosis/focal occlusion with distally   preserved patency. Calcified plaque in the cavernous and clinoid segments.    Left common carotid artery: Patent.  Left external carotid artery: Patent.  Left internal carotid artery: Patent. Moderate to severe stenosis of the   proximal cervical segment and mild stenosis of the posterior cavernous   segment. Calcified plaque in the cavernous and clinoid segments.    Anterior circulation:     Right anterior cerebral artery: Patent.    Right middle cerebral artery: Patent.      Anterior communicating artery complex: Within normal limits.    Left anterior cerebral artery: Patent.    Left middle cerebral artery: Patent.    Posterior circulation:    Posterior and anterior inferior cerebellar arteries: Patent.      Basilar artery: Patent.    Superior cerebellar arteries: Patent.    Right posterior cerebral artery: Patent.    Left posterior cerebral artery: Patent.      Posterior communicating arteries: Not well visualized bilaterally.    Veins: The visualized dural venous sinuses are patent.     Vascular measurements were made according to NASCET criteria.    IMPRESSION:     CT HEAD: Acute right middle cerebral artery infarct involving the right   precentral gyrus and centrum semiovale. ASPECTS score 8 out of 10. No   hemorrhage or midline shift.     CTA HEAD AND NECK:   -Severe stenosis at the origin of the right internal carotid artery with   filling defect in the anterior cavernous segment suspicious for critical   stenosis/focal occlusion.    -Moderate to severe stenosis of the proximal cervical segment and mild   stenosis of the posterior cavernous segment of the left internal carotid   artery.    Consider conventional angiography for improved characterization of degree   of stenoses and hemodynamics.    Call Back:  I discussed this case with Dr. Mittal at 4:35 AM on 6/20/2019.    Hospital policies for call back including read back policy were followed.   The verbal communication call back supplements this written report.             Labs:                        15.9   16.9  )-----------( 397      ( 20 Jun 2019 10:15 )             51.5     06-20    138  |  99  |  15  ----------------------------<  167<H>  4.5   |  21<L>  |  1.03    Ca    9.2      20 Jun 2019 10:15    TPro  7.1  /  Alb  3.9  /  TBili  1.9<H>  /  DBili  x   /  AST  18  /  ALT  26  /  AlkPhos  76  06-20    PT/INR - ( 20 Jun 2019 10:15 )   PT: 15.4 sec;   INR: 1.33 ratio         PTT - ( 20 Jun 2019 10:15 )  PTT:25.2 sec  CARDIAC MARKERS ( 20 Jun 2019 00:17 )  <.015 ng/mL / x     / x     / x     / x          Serum Pro-Brain Natriuretic Peptide: 2385 pg/mL (06-20 @ 00:17)

## 2019-06-20 NOTE — OCCUPATIONAL THERAPY INITIAL EVALUATION ADULT - FINE MOTOR COORDINATION TRAINING, OT EVAL
GOAL: Pt will increase speed/accuracy of finger to nose task with LUE 3/4 trials to increase participation in ADLs in 4 weeks

## 2019-06-20 NOTE — PHYSICAL THERAPY INITIAL EVALUATION ADULT - IMPAIRMENTS FOUND, PT EVAL
muscle strength/arousal, attention, and cognition/ROM/aerobic capacity/endurance/gait, locomotion, and balance

## 2019-06-20 NOTE — PROVIDER CONTACT NOTE (CHANGE IN STATUS NOTIFICATION) - ACTION/TREATMENT ORDERED:
As per NP pt exam unchanged  and deficit corresponds with area of injury no new orders at this time.

## 2019-06-20 NOTE — PROGRESS NOTE ADULT - ASSESSMENT
ASSESSMENT/PLAN:  R centrum semiovale and pre central gyral lucency c/w stroke;  Focal Seizure; HTN, hypoxia    NEURO:  Obtain CT now before resuming heparin to evaluate progression of infarct and risk of IPH   Neuro checks q 1 hr  Stroke Core Measures  If CT scan stable start heparin drip with documented crescendo symptoms  Heparin- PTT goal 50-80 sec   Video EEG to R/O seizure  Keppra increase to 1 gram q 12  Activity:  [X] Bedrest     PULM: Hx suggestive of NIMESH  Continue BIPAP for Now   Check ABG   Check CXR    CV: Hx of HTN  Check EKG in am  Troponins normal  Repeat BNP  Cardiac Echo   SBP goal- 120- < 160 ( caution increasing pressure higher if on heparin drip)      RENAL: No mitchell need at this   Fluids:    GI:  Probable Irritable Bowel hx   Diet: NPO for now  GI prophylaxis [X] not indicated   Bowel regimen [X] colace [X] senna [] other:    ENDO:    Check HGBA1C  Check Lipids  Check TFT's  Goal euglycemia (-180)    HEME/ONC: Monitor PTT while on heaprin  VTE prophylaxis: [X] SCDs [X] chemoprophylaxis- on heparin infusion    ID: Monitor for fever    MISC: Will re- evaluate after CT. for possible ICU if patient not waking up more after ativan metabolized will recommend ICU monitoring.     SOCIAL/FAMILY:  [X] updated at bedside     CODE STATUS:  [X] Full Code     DISPOSITION:   [X] Stroke Unit [] Floor [] EMU [] RCU [] PCU      Time seen: 1130  Time spent: 40 minutes

## 2019-06-20 NOTE — OCCUPATIONAL THERAPY INITIAL EVALUATION ADULT - RANGE OF MOTION EXAMINATION, UPPER EXTREMITY
Right UE Active ROM was WNL (within normal limits)/Left UE Passive ROM was WFL  (within functional limits)

## 2019-06-20 NOTE — PROGRESS NOTE ADULT - SUBJECTIVE AND OBJECTIVE BOX
HPI    54y/o F RH  PMH Anxiety, 40 pk/yr smoking hx(still active), does not get regular checkups presented to [facility] w/ L arm numbness, spasms starting 2000 6/19. LKW 6/19 2000. CT/CTA H/N done w/ severe stenosis at the origin of the right internal carotid artery with filling defect in the anterior cavernous segment suspicious for critical stenosis/focal occlusion as well as moderate to severe stenosis of the proximal cervical segment and mild stenosis of the posterior cavernous segment of the left internal carotid artery. Dense L arm paresis noted at 0345AM, transferred to [facility] for potential angio and further management of condition. On initial assessment in ED, NIHSS 6.    Called to exam patient who at 0900 developed "jerking movements of Lhand to LUE with subsequent fascial twitching. Pt. also at the time was tachypnic and low sats monitor. Pt was placed on BIPAP and given 2.5 mg IV with ativan/ 1 gram keppra aborting seizure activity. Episode lasted approx 10minutes.      MEDICATIONS  (STANDING):  atorvastatin 80 milliGRAM(s) Oral at bedtime  heparin  Infusion 1200 Unit(s)/Hr (12 mL/Hr) IV Continuous   levETIRAcetam  IVPB 1000 milliGRAM(s) IV Intermittent once  LORazepam   Injectable 2 milliGRAM(s) IV Push once    MEDICATIONS  (PRN):    ICU Vital Signs Last 24 Hrs  T(C): 36.7 (20 Jun 2019 07:30), Max: 37.1 (20 Jun 2019 04:32)  HR: 107 (20 Jun 2019 10:00) (98 - 148)  BP: 164/95 (20 Jun 2019 10:00) (161/113 - 235/134)  RR: 22 (20 Jun 2019 10:00) (18 - 30)  SpO2: 98% (20 Jun 2019 10:00) (93% - 100%)                          15.9   16.9  )-----------( 397      ( 20 Jun 2019 10:15 )             51.5   06-20    138  |  99  |  15  ----------------------------<  167<H>  4.5   |  21<L>  |  1.03    Ca    9.2      20 Jun 2019 10:15    TPro  7.1  /  Alb  3.9  /  TBili  1.9<H>  /  DBili  x   /  AST  18  /  ALT  26  /  AlkPhos  76  06-20    PT/INR - ( 20 Jun 2019 10:15 )   PT: 15.4 sec;   INR: 1.33 ratio    PTT - ( 20 Jun 2019 10:15 )  PTT:25.2 sec    Troponin X2 - neg  EKG- LPFB           Nl sinus rhythmn  HGBA1C- P  Lipids- P    CAPILLARY BLOOD GLUCOSE  POCT Blood Glucose.: 158 mg/dL (20 Jun 2019 09:51)  POCT Blood Glucose.: 112 mg/dL (20 Jun 2019 06:48)      CT Head No Cont (06.20.19 @ 07:27) >  COMPARISON STUDY: CTA head 6/20/2019    Unchanged lucency of the right centrum semiovale and right precentral   gyrus corresponding to acute to subacute right middle cerebral artery   infarct.    There is no CT evidence of acute intracranial hemorrhage, extra-axial   collection, midline shift, central herniation or hydrocephalus.     The visualized paranasal sinuses are clear. The mastoid air cells and   middle ear cavities are clear.    The soft tissues of the scalp are unremarkable. The calvarium is intact.    IMPRESSION:    No interval change in right middle cerebral artery infarct involving the   right precentral gyrus and centrum semiovale. No hemorrhage. ASPECTS   score 8 out of 10.      CT Angio Head w/ IV Cont (06.20.19 @ 04:19) >  NONCONTRAST HEAD CT:  Motion degraded, however there is loss of gray-white matter   differentiation in the right frontal lobe involving the right precentral   gyrus and centrum semiovale with sulcal effacement, indicative of acute   to subacute infarct. There is no hemorrhage. The ventricles demonstrate   normal size and configuration.    The paranasal sinuses and mastoids are clear. The calvarium is intact.     CTA HEAD AND NECK:  Evaluation limited due to quantum mottle, particularly of the neck.    Aortic arch: Not imaged. Bovine anatomic variant with common origin of   the brachiocephalic and left common carotid arteries.  Brachiocephalic artery: Patent.  Right subclavian artery: Patent.  Left subclavian artery: Patent.    Right vertebral artery: Patent.  Left vertebral artery: Patent.    Right common carotid artery: Patent.  Right external carotid artery: Patent.  Right internal carotid artery: Severe stenosis at the origin. Calcified   plaque in the proximal cervical segment without flow-limiting stenosis.   Filling defect in the anterior cavernous segment (5-332) suspicious for   thrombus causing critical stenosis/focal occlusion with distally   preserved patency. Calcified plaque in the cavernous and clinoid segments.    Left common carotid artery: Patent.  Left external carotid artery: Patent.  Left internal carotid artery: Patent. Moderate to severe stenosis of the   proximal cervical segment and mild stenosis of the posterior cavernous   segment. Calcified plaque in the cavernous and clinoid segments.    Anterior circulation:     Right anterior cerebral artery: Patent.    Right middle cerebral artery: Patent.      Anterior communicating artery complex: Within normal limits.    Left anterior cerebral artery: Patent.    Left middle cerebral artery: Patent.    Posterior circulation:    Posterior and anterior inferior cerebellar arteries: Patent.      Basilar artery: Patent.    Superior cerebellar arteries: Patent.    Right posterior cerebral artery: Patent.    Left posterior cerebral artery: Patent.      Posterior communicating arteries: Not well visualized bilaterally.    Veins: The visualized dural venous sinuses are patent.     Vascular measurements were made according to NASCET criteria.    IMPRESSION:     CT HEAD: Acute right middle cerebral artery infarct involving the right   precentral gyrus and centrum semiovale. ASPECTS score 8 out of 10. No   hemorrhage or midline shift.     CTA HEAD AND NECK:   -Severe stenosis at the origin of the right internal carotid artery with   filling defect in the anterior cavernous segment suspicious for critical   stenosis/focal occlusion.    -Moderate to severe stenosis of the proximal cervical segment and mild   stenosis of the posterior cavernous segment of the left internal carotid   artery.    Chest 1 View AP/PA (06.20.19 @ 00:34) >  Interstitial prominence likely edema. No focal infiltrates.      Exam  Pt received 2.5 mg ativan orior to evaluation  Pt on BIPAP breathing comfortably, eyes closed.  Neuro - Drowsy, not opening eyes to loud voice or noxious yet vocalizes approprately one - two words.  Pupils 2mm and reactive B/L , does not track to voice or commands, , occulocephalic intact, follows commands after noxiuos stimuli - showing two fingers and wiggling fingers  Motor- RUE 4+/RLE 4+/5 ,                 LUE- withdraw to noxious, withdraws briskly LLE  Heart - Nl RR no S3 S4  Lungs- decreased BS at bases yet no wheezing, rhonchi or crackles   Abd- Increased pannus, pos BS , soft , no guarding or tenderness  Ext- warm and perfusing well

## 2019-06-20 NOTE — ED PROVIDER NOTE - PHYSICAL EXAMINATION
Gen: Alert, NAD  Head/eyes: NC/AT, PERRL, EOMI, normal lids/conjunctiva, no scleral icterus  ENT: airway patent  Neck: supple, no tenderness/meningismus/JVD, Trachea midline  Pulm/lung: Bilateral clear BS, normal resp effort, no wheeze/stridor/retractions  CV/heart: RRR, no M/R/G, +2 dist pulses (radial, pedal DP/PT, popliteal)  GI/Abd: soft, NT/ND, +BS, no guarding/rebound tenderness  Musculoskeletal: no edema/erythema/cyanosis, FROM in all extremities, no C/T/L spine ttp, +left hand visibly in contraction  Skin: no rash, no vesicles, no petechaie, no ecchymosis, no swelling  Neuro: AAOx3, CN 2-12 intact, normal sensation, 5/5 motor strength in all extremities except left arm 4/5, normal gait, no dysmetria

## 2019-06-20 NOTE — PHYSICAL THERAPY INITIAL EVALUATION ADULT - PERTINENT HX OF CURRENT PROBLEM, REHAB EVAL
54 yo F PMHx obesity, smoker, transfer from [place] with episode of twitching and left arm numbness that started at 23:00 yesterday. CT head revealed Acute R MCA infarct and CT neck Severe stenosis at the origin of the R ICA (outside window for tPA). According to EMS patient was hyperventilating in severe resp distress in which ABG revealed hypoxemia.  She developed severe resp distress and was nearly intubated. 52 yo Female with  PMHx obesity, smoker, transfer from [place] with episode of twitching and left arm numbness that started at 23:00 yesterday. CT head revealed Acute R MCA infarct and CT neck Severe stenosis at the origin of the R ICA (outside window for tPA). According to EMS patient was hyperventilating in severe resp distress in which ABG revealed hypoxemia.  She developed severe resp distress and was nearly intubated.

## 2019-06-20 NOTE — ED ADULT NURSE NOTE - OBJECTIVE STATEMENT
52 yo female presents to ED via EMS transfer from [facility] for Stroke Rescue. Per EMS, patient woke up with L arm weakness at 0345 AM, and presented to hospital for evaluation of twitching and numbness of L arm. At [place], patient developed respiratory distress - patient mildly hypoxic, placed on BiPAP. Patient last known normal at 2300 6/19/19. Upon arrival, patient denies SOB, CP, nvd, fever/chills, recent illness/travel, falls/loc. Patient A&Ox3, breathing on BiPap, airway patent, bl clear lungs, abdomen nontender, +pulses, cap refill <2 seconds. Patient resting in bed, side rails up, plan of care explained. Code Stroke initiated at 0647. MD Santana & HOMERO Wisdom at bedside. 54 yo female presents to ED via EMS transfer from [facility] for Stroke Rescue. Per EMS, patient had onset of L arm weakness at 2300 on 6/19, and presented to hospital for evaluation of twitching and numbness of L arm. Patient had 1 additional episode of L arm weakness at 0345. At [place], patient developed respiratory distress - patient mildly hypoxic, placed on BiPAP. Patient last known normal at 2300 6/19/19. Upon arrival, patient denies SOB, CP, nvd, fever/chills, recent illness/travel, falls/loc. Patient A&Ox3, breathing on BiPap, airway patent, bl clear lungs, abdomen nontender, +pulses, cap refill <2 seconds. Patient resting in bed, side rails up, plan of care explained. Code Stroke initiated at 0647. MD Santana & HOMERO Wisdom at bedside.

## 2019-06-20 NOTE — ED PROVIDER NOTE - CRITICAL CARE PROVIDED
additional history taking/documentation/consult w/ pt's family directly relating to pts condition/direct patient care (not related to procedure)/consultation with other physicians/interpretation of diagnostic studies/conducted a detailed discussion of DNR status

## 2019-06-20 NOTE — EEG REPORT - NS EEG TEXT BOX
DEB MENDEZ [id number]     Study Date: 		06-20-19    ROUTINE EEG    Technical Information:			  		  Placement and Labeling of Electrodes:  The EEG was performed utilizing 20 channels referential EEG connections (coronal over temporal over parasagittal montage) using all standard 10-20 electrode placements with EKG.  Recording was at a sampling rate of 256 samples per second per channel.  Time synchronized digital video recording was done simultaneously with EEG recording.  A low light infrared camera was used for low light recording.  Shashank and seizure detection algorithms were utilized.    CSA Technical Component:  Quantitative EEG analysis using a separate Compressed Spectral Array (CSA) software package was conducted in real-time and run at bedside after set up by the technician, digitally displaying the power of electrographic frequencies included in the 1-30Hz band using a graded color map.  This data was reviewed and interpreted independently, and is reported in a separate section below.    --------------------------------------------------------------------------------------------------  History:  CC/ HPI Patient is a 53y old  Female who presents with a chief complaint of   MEDICATIONS  (STANDING):  atorvastatin 80 milliGRAM(s) Oral at bedtime  heparin  Infusion 1200 Unit(s)/Hr (12 mL/Hr) IV Continuous <Continuous>  levETIRAcetam  IVPB 1000 milliGRAM(s) IV Intermittent every 12 hours  nicotine - 21 mG/24Hr(s) Patch 1 patch Transdermal daily    --------------------------------------------------------------------------------------------------  Study Interpretation:    [[[Abbreviation Key:  PDR=alpha rhythm/posterior dominant rhythm. A-P=anterior posterior gradient.  Amplitude: ‘very low’:<20; ‘low’:20-50; ‘medium’:; ‘high’:>200uV.  Persistence for periodic/rhythmic patterns (% of epoch) ‘rare’:<1%; ‘occasional’:1-10%; ‘frequent’:10-50%; ‘abundant’:50-90%; ‘continuous’:>90%.  Persistence for sporadic discharges: ‘rare’:<1/hr; ‘occasional’:1/min-1/hr; ‘frequent’:>1/min; ‘abundant’:>1/10 sec.  GRDA=generalized rhythmic delta activity, LRDA=lateralized rhythmic delta activity, TIRDA=temporal intermittent rhythmic delta activity, FIRDA=frontal intermittent rhythmic activity. LPD=PLED=lateralized periodic discharges, GPD=generalized periodic discharges, BiPDs=BiPLEDs=bilateral independent periodic epileptiform discharges, SIRPID=stimulus induced rhythmic, periodic, or ictal appearing discharges.  Modifiers: +F=with fast component, +S=with spike component, +R=with rhythmic component.  S-B=burst suppression pattern.  Max=maximal. N1-drowsy, N2-stage II sleep, N3-slow wave sleep.  HV=hyperventilation, PS=photic stimulation]]]    FINDINGS:  The background was continuous, spontaneously variable and reactive.  During wakefulness, the posteriorly dominant rhythm consisted of symmetric, well modulated  9Hz activity, with an amplitude to 30 uV, that attenuated to eye opening.  Low amplitude diffuse beta was noted in wakefulness.    Background Slowing:  Generalized slowing: diffuse irregular delta was present.  Focal slowing: none was present.    Sleep Background:  Drowsiness was characterized by fragmentation, attenuation, and slowing of the background activity.    Sleep was characterized by the presence of vertex waves, symmetric spindles, and K-complexes.    Epileptiform Activity:   No epileptiform discharges were present.    Events:  No clinical events were recorded.  No seizures were recorded.    Activation Procedures:   -Hyperventilation was not performed.    -Photic stimulation was not performed.    Artifacts:  Intermittent myogenic and movement artifacts were noted.    ECG:  The heart rate on single channel ECG at baseline was predominantly near BPM = 80-90  -----------------------------------------------------------------------------------------------------    EEG Classification / Summary:  Abnormal EEG study, awake / drowsy / asleep    -Mild diffuse slowing, drowsiness, diffuse beta   -----------------------------------------------------------------------------------------------------    Clinical Impression:  Mild diffuse cerebral dysfunction, diffuse fast activity typically a medication effect  There were no epileptiform abnormalities recorded.      -------------------------------------------------------------------------------------------------------  Trevor Alvarez M.D.   of Neurology, [facility] [place]

## 2019-06-20 NOTE — OCCUPATIONAL THERAPY INITIAL EVALUATION ADULT - PLANNED THERAPY INTERVENTIONS, OT EVAL
strengthening/ROM/balance training/fine motor coordination training/transfer training/bed mobility training/ADL retraining

## 2019-06-20 NOTE — ED PROVIDER NOTE - PHYSICAL EXAMINATION
on Bipap upon arrival to ED  Neuro: unable to assess facial symmetry 2/2 bipap possible left side facial droop, unable to move LUE and cannot assess neurosensation to LUE

## 2019-06-20 NOTE — ED PROVIDER NOTE - NS ED ROS FT

## 2019-06-20 NOTE — ED PROVIDER NOTE - NSRISKOFTRANSFER_ED_A_ED
Increased Pain/Death or Disability/Transportation Risk (There is always a risk of traffic delays resulting in deterioration of condition.)/Deterioration of Condition En Route

## 2019-06-20 NOTE — OCCUPATIONAL THERAPY INITIAL EVALUATION ADULT - BALANCE TRAINING, PT EVAL
GOAL: Pt will increase dynamic standing balance to good to increase participation in ADLs in 4 weeks

## 2019-06-20 NOTE — ED PROVIDER NOTE - PROGRESS NOTE DETAILS
having severe hyperventilation, becoming mottled, left arm still spasming and twitching, IV ativan given, RSI Kit and intubation tray at bedside, patient symptoms improving after ativan, ICU consulted, patient left arm now 0/5/flaccid, ICU at bedside, CTA head/neck ordered discussed case with Dr. Pennington does not recommend tPA since symptoms started at 8pm having severe hyperventilation, becoming mottled, left arm still spasming and twitching, IV ativan given, RSI Kit and intubation tray at bedside, patient symptoms improving after ativan, ICU consulted, (wasn't able to get CT scan secondary to patient moving, 3 attempts tried) patient left arm now 0/5 flaccid, ICU at bedside, CTA head/neck ordered trying to page Dr. Chand, discussed case with Dr. Pennington does not recommend tPA since symptoms started at 8pm, with CT findings show acute infarct discussed case with Dr. Sharp radiology, now noticed right internal carotid thrombus that would be a good candidate for intervention discussed case with Dr. Pennington again regarding new CTA finding as per radiologist Dr. Sharp, states patient still not candidate for thrombectomy secondary to CT already showing acute infarct.  Does not recommend tPA. eICU attending and radiologist spoke with Dr. Pennington directly urging for transfer for intervention.  Dr. Pennington now accepting trying to page Dr. Chand no call back, discussed case with Dr. Pennington does not recommend tPA since symptoms started at 8pm, with CT findings show acute infarct

## 2019-06-21 ENCOUNTER — APPOINTMENT (OUTPATIENT)
Dept: NEUROSURGERY | Facility: HOSPITAL | Age: 54
End: 2019-06-21
Payer: COMMERCIAL

## 2019-06-21 ENCOUNTER — TRANSCRIPTION ENCOUNTER (OUTPATIENT)
Age: 54
End: 2019-06-21

## 2019-06-21 LAB
ANION GAP SERPL CALC-SCNC: 11 MMOL/L — SIGNIFICANT CHANGE UP (ref 5–17)
ANION GAP SERPL CALC-SCNC: 14 MMOL/L — SIGNIFICANT CHANGE UP (ref 5–17)
APTT BLD: 46.6 SEC — HIGH (ref 27.5–36.3)
APTT BLD: 69 SEC — HIGH (ref 27.5–36.3)
BUN SERPL-MCNC: 12 MG/DL — SIGNIFICANT CHANGE UP (ref 7–23)
BUN SERPL-MCNC: 13 MG/DL — SIGNIFICANT CHANGE UP (ref 7–23)
CALCIUM SERPL-MCNC: 8.4 MG/DL — SIGNIFICANT CHANGE UP (ref 8.4–10.5)
CALCIUM SERPL-MCNC: 8.8 MG/DL — SIGNIFICANT CHANGE UP (ref 8.4–10.5)
CHLORIDE SERPL-SCNC: 101 MMOL/L — SIGNIFICANT CHANGE UP (ref 96–108)
CHLORIDE SERPL-SCNC: 99 MMOL/L — SIGNIFICANT CHANGE UP (ref 96–108)
CHOLEST SERPL-MCNC: 118 MG/DL — SIGNIFICANT CHANGE UP (ref 10–199)
CO2 SERPL-SCNC: 23 MMOL/L — SIGNIFICANT CHANGE UP (ref 22–31)
CO2 SERPL-SCNC: 29 MMOL/L — SIGNIFICANT CHANGE UP (ref 22–31)
CREAT SERPL-MCNC: 0.93 MG/DL — SIGNIFICANT CHANGE UP (ref 0.5–1.3)
CREAT SERPL-MCNC: 0.93 MG/DL — SIGNIFICANT CHANGE UP (ref 0.5–1.3)
GLUCOSE SERPL-MCNC: 103 MG/DL — HIGH (ref 70–99)
GLUCOSE SERPL-MCNC: 110 MG/DL — HIGH (ref 70–99)
HCG UR QL: NEGATIVE — SIGNIFICANT CHANGE UP
HCT VFR BLD CALC: 41.3 % — SIGNIFICANT CHANGE UP (ref 34.5–45)
HCT VFR BLD CALC: 44 % — SIGNIFICANT CHANGE UP (ref 34.5–45)
HCT VFR BLD CALC: 51.3 % — HIGH (ref 34.5–45)
HDLC SERPL-MCNC: 27 MG/DL — LOW
HGB BLD-MCNC: 13.3 G/DL — SIGNIFICANT CHANGE UP (ref 11.5–15.5)
HGB BLD-MCNC: 13.7 G/DL — SIGNIFICANT CHANGE UP (ref 11.5–15.5)
HGB BLD-MCNC: 15.2 G/DL — SIGNIFICANT CHANGE UP (ref 11.5–15.5)
LIPID PNL WITH DIRECT LDL SERPL: 71 MG/DL — SIGNIFICANT CHANGE UP
MAGNESIUM SERPL-MCNC: 1.6 MG/DL — SIGNIFICANT CHANGE UP (ref 1.6–2.6)
MCHC RBC-ENTMCNC: 22.4 PG — LOW (ref 27–34)
MCHC RBC-ENTMCNC: 23.7 PG — LOW (ref 27–34)
MCHC RBC-ENTMCNC: 24.5 PG — LOW (ref 27–34)
MCHC RBC-ENTMCNC: 29.6 GM/DL — LOW (ref 32–36)
MCHC RBC-ENTMCNC: 31.2 GM/DL — LOW (ref 32–36)
MCHC RBC-ENTMCNC: 32.2 GM/DL — SIGNIFICANT CHANGE UP (ref 32–36)
MCV RBC AUTO: 75.4 FL — LOW (ref 80–100)
MCV RBC AUTO: 75.9 FL — LOW (ref 80–100)
MCV RBC AUTO: 76 FL — LOW (ref 80–100)
PA ADP PRP-ACNC: 176 PRU — LOW (ref 194–417)
PHOSPHATE SERPL-MCNC: 4.4 MG/DL — SIGNIFICANT CHANGE UP (ref 2.5–4.5)
PLATELET # BLD AUTO: 293 K/UL — SIGNIFICANT CHANGE UP (ref 150–400)
PLATELET # BLD AUTO: 295 K/UL — SIGNIFICANT CHANGE UP (ref 150–400)
PLATELET # BLD AUTO: 352 K/UL — SIGNIFICANT CHANGE UP (ref 150–400)
POTASSIUM SERPL-MCNC: 3.4 MMOL/L — LOW (ref 3.5–5.3)
POTASSIUM SERPL-MCNC: 4 MMOL/L — SIGNIFICANT CHANGE UP (ref 3.5–5.3)
POTASSIUM SERPL-SCNC: 3.4 MMOL/L — LOW (ref 3.5–5.3)
POTASSIUM SERPL-SCNC: 4 MMOL/L — SIGNIFICANT CHANGE UP (ref 3.5–5.3)
RBC # BLD: 5.42 M/UL — HIGH (ref 3.8–5.2)
RBC # BLD: 5.8 M/UL — HIGH (ref 3.8–5.2)
RBC # BLD: 6.8 M/UL — HIGH (ref 3.8–5.2)
RBC # FLD: 17.8 % — HIGH (ref 10.3–14.5)
RBC # FLD: 17.9 % — HIGH (ref 10.3–14.5)
RBC # FLD: 20.9 % — HIGH (ref 10.3–14.5)
SODIUM SERPL-SCNC: 138 MMOL/L — SIGNIFICANT CHANGE UP (ref 135–145)
SODIUM SERPL-SCNC: 139 MMOL/L — SIGNIFICANT CHANGE UP (ref 135–145)
TOTAL CHOLESTEROL/HDL RATIO MEASUREMENT: 4.4 RATIO — SIGNIFICANT CHANGE UP (ref 3.3–7.1)
TRIGL SERPL-MCNC: 100 MG/DL — SIGNIFICANT CHANGE UP (ref 10–149)
WBC # BLD: 10.8 K/UL — HIGH (ref 3.8–10.5)
WBC # BLD: 12.2 K/UL — HIGH (ref 3.8–10.5)
WBC # BLD: 12.67 K/UL — HIGH (ref 3.8–10.5)
WBC # FLD AUTO: 10.8 K/UL — HIGH (ref 3.8–10.5)
WBC # FLD AUTO: 12.2 K/UL — HIGH (ref 3.8–10.5)
WBC # FLD AUTO: 12.67 K/UL — HIGH (ref 3.8–10.5)

## 2019-06-21 PROCEDURE — 99221 1ST HOSP IP/OBS SF/LOW 40: CPT

## 2019-06-21 PROCEDURE — 99233 SBSQ HOSP IP/OBS HIGH 50: CPT

## 2019-06-21 PROCEDURE — 99291 CRITICAL CARE FIRST HOUR: CPT

## 2019-06-21 PROCEDURE — 36224 PLACE CATH CAROTD ART: CPT | Mod: 59,LT

## 2019-06-21 PROCEDURE — 93010 ELECTROCARDIOGRAM REPORT: CPT

## 2019-06-21 PROCEDURE — 93970 EXTREMITY STUDY: CPT | Mod: 26

## 2019-06-21 PROCEDURE — 36225 PLACE CATH SUBCLAVIAN ART: CPT | Mod: 59,RT

## 2019-06-21 PROCEDURE — 37215 TRANSCATH STENT CCA W/EPS: CPT | Mod: RT

## 2019-06-21 PROCEDURE — 93306 TTE W/DOPPLER COMPLETE: CPT | Mod: 26

## 2019-06-21 PROCEDURE — 95951: CPT | Mod: 26,52

## 2019-06-21 PROCEDURE — 93880 EXTRACRANIAL BILAT STUDY: CPT | Mod: 26

## 2019-06-21 PROCEDURE — 36226 PLACE CATH VERTEBRAL ART: CPT | Mod: LT

## 2019-06-21 RX ORDER — CLOPIDOGREL BISULFATE 75 MG/1
75 TABLET, FILM COATED ORAL DAILY
Refills: 0 | Status: DISCONTINUED | OUTPATIENT
Start: 2019-06-22 | End: 2019-06-22

## 2019-06-21 RX ORDER — MAGNESIUM SULFATE 500 MG/ML
2 VIAL (ML) INJECTION ONCE
Refills: 0 | Status: COMPLETED | OUTPATIENT
Start: 2019-06-21 | End: 2019-06-22

## 2019-06-21 RX ORDER — CHLORHEXIDINE GLUCONATE 213 G/1000ML
1 SOLUTION TOPICAL
Refills: 0 | Status: DISCONTINUED | OUTPATIENT
Start: 2019-06-21 | End: 2019-06-27

## 2019-06-21 RX ORDER — POTASSIUM CHLORIDE 20 MEQ
10 PACKET (EA) ORAL ONCE
Refills: 0 | Status: COMPLETED | OUTPATIENT
Start: 2019-06-21 | End: 2019-06-21

## 2019-06-21 RX ORDER — IPRATROPIUM/ALBUTEROL SULFATE 18-103MCG
3 AEROSOL WITH ADAPTER (GRAM) INHALATION EVERY 6 HOURS
Refills: 0 | Status: DISCONTINUED | OUTPATIENT
Start: 2019-06-21 | End: 2019-06-25

## 2019-06-21 RX ORDER — CLOPIDOGREL BISULFATE 75 MG/1
600 TABLET, FILM COATED ORAL ONCE
Refills: 0 | Status: COMPLETED | OUTPATIENT
Start: 2019-06-21 | End: 2019-06-21

## 2019-06-21 RX ORDER — BUDESONIDE, MICRONIZED 100 %
0.25 POWDER (GRAM) MISCELLANEOUS EVERY 12 HOURS
Refills: 0 | Status: DISCONTINUED | OUTPATIENT
Start: 2019-06-21 | End: 2019-06-25

## 2019-06-21 RX ORDER — ASPIRIN/CALCIUM CARB/MAGNESIUM 324 MG
325 TABLET ORAL DAILY
Refills: 0 | Status: DISCONTINUED | OUTPATIENT
Start: 2019-06-22 | End: 2019-06-22

## 2019-06-21 RX ORDER — POTASSIUM CHLORIDE 20 MEQ
20 PACKET (EA) ORAL ONCE
Refills: 0 | Status: DISCONTINUED | OUTPATIENT
Start: 2019-06-21 | End: 2019-06-22

## 2019-06-21 RX ORDER — ACETAMINOPHEN 500 MG
1000 TABLET ORAL ONCE
Refills: 0 | Status: COMPLETED | OUTPATIENT
Start: 2019-06-21 | End: 2019-06-21

## 2019-06-21 RX ORDER — NICARDIPINE HYDROCHLORIDE 30 MG/1
5 CAPSULE, EXTENDED RELEASE ORAL
Qty: 40 | Refills: 0 | Status: DISCONTINUED | OUTPATIENT
Start: 2019-06-21 | End: 2019-06-22

## 2019-06-21 RX ORDER — POTASSIUM CHLORIDE 20 MEQ
40 PACKET (EA) ORAL ONCE
Refills: 0 | Status: DISCONTINUED | OUTPATIENT
Start: 2019-06-21 | End: 2019-06-21

## 2019-06-21 RX ADMIN — Medication 10 MILLIGRAM(S): at 12:31

## 2019-06-21 RX ADMIN — Medication 1 PATCH: at 11:53

## 2019-06-21 RX ADMIN — CLOPIDOGREL BISULFATE 600 MILLIGRAM(S): 75 TABLET, FILM COATED ORAL at 11:53

## 2019-06-21 RX ADMIN — Medication 100 MILLIEQUIVALENT(S): at 11:52

## 2019-06-21 RX ADMIN — CHLORHEXIDINE GLUCONATE 1 APPLICATION(S): 213 SOLUTION TOPICAL at 21:13

## 2019-06-21 RX ADMIN — NICARDIPINE HYDROCHLORIDE 25 MG/HR: 30 CAPSULE, EXTENDED RELEASE ORAL at 23:51

## 2019-06-21 RX ADMIN — Medication 400 MILLIGRAM(S): at 02:35

## 2019-06-21 RX ADMIN — LEVETIRACETAM 400 MILLIGRAM(S): 250 TABLET, FILM COATED ORAL at 05:55

## 2019-06-21 RX ADMIN — Medication 1 PATCH: at 20:39

## 2019-06-21 NOTE — PROGRESS NOTE ADULT - ASSESSMENT
54y/o F RH  PMH Anxiety, 40 pk/yr smoking hx(still active), does not get regular checkups presented to [facility] w/ L arm numbness, spasms starting 2000 6/19. LKW 6/19 2000. CT/CTA H/N done w/ severe stenosis at the origin of the right internal carotid artery with filling defect in the anterior cavernous segment suspicious for critical stenosis/focal occlusion as well as moderate to severe stenosis of the proximal cervical segment and mild stenosis of the posterior cavernous segment of the left internal carotid artery.   ·	Echo shows moderate severe global LV dysfunction. Severe LA enlargement, elevated PA pressures with non-ischemic ECG and trop not consistent with ACS suggest some chronicity to LV dysfunction. Possibly HTN related. Volume status is improved. Given this and unlikely acute coronary syndrome can proceed with carotid stenting given then time sensitive nature. Will plan for cardiac cath next week. Risks discussed with patient and family. Case discussed with Dr. Puga.   ·	Continue present medications for now. Will need aggressive BP control when ok with neuro.   ·	Strict I and O.   ·	  ·	=======================================================================  ·	Lito Moise MD FAC  ·	  ·	Please page [phone] with questions  ·	On nights and weekend please call the office [phone].  ·	For all Cardiology service contact information, go to [email or web address] and use "[email or web address]" to login.

## 2019-06-21 NOTE — CHART NOTE - NSCHARTNOTEFT_GEN_A_CORE
Interventional Neuro Radiology  Pre-Procedure Note PA-C    This is a 53 year old right hand dominant  female with a PMH anxiety, smoking hx(still active), does not get regular checkups presented to [facility] w/ left arm numbness, spasms starting 2000 6/19. LKW 6/19 2000. CT/CTA H/N done w/ severe stenosis at the origin of the right internal carotid artery with filling defect in the anterior cavernous segment suspicious for critical stenosis/focal occlusion as well as moderate to severe stenosis of the proximal cervical segment and mild stenosis of the posterior cavernous segment of the left internal carotid artery. Dense L arm paresis noted at 0345AM, transferred to [facility] for potential angio and further management of condition. On initial assessment in ED, NIHSS 6. (20 Jun 2019 07:01)Patient is transported to Neuro IR for a selective cerebral angiography and carotid stent placement.     Allergies: No Known Allergies  PMHX: anxiety  PSHX: no significant past surgical history  Social History:  +tobacco   FAMILY HISTORY: non-contributory     Current Medications: acetaminophen  tablet 650 milliGRAM(s) Oral every 6 hours PRN  atorvastatin 80 milliGRAM(s) Oral at bedtime  heparin  Infusion 1550 Unit(s)/Hr IV Continuous   hydrALAZINE Injectable 5 milliGRAM(s) IV Push every 6 hours PRN  levETIRAcetam  IVPB 1000 milliGRAM(s) IV Intermittent every 12 hours  LORazepam     Tablet 1 milliGRAM(s) Oral once PRN  nicotine - 21 mG/24Hr(s) Patch 1 patch Transdermal daily  potassium chloride   Powder 20 milliEquivalent(s) Oral once    CBC                        13.7   10.8  )-----------( 293                   44.0       BMP    139  |  99  |  12  ----------------------------<  103  3.4<L>   |  29  |  0.93         Blood Bank: O positive available     Assessment/Plan:   This is a 53 year old right hand dominant female with symptomatic carotid stenosis, who is transported to Neuro IR for a selective cerebral angiography and carotid stent placement. Procedure, goals, risks, benefits and alternatives were discussed with patient and patient's family. All questions were answered. Risks include but are not limited to stroke, vessel injury, hemorrhage, and or right groin hematoma. Patient demonstrates understanding of all risks involved with this procedure and wishes to continue. Appropriate content was obtained from patient and consent is in the patient's chart. Interventional Neuro Radiology  Pre-Procedure Note PA-C    This is a 53 year old right hand dominant  female with a PMH anxiety, does not get regular checkups presented to [facility] w/ left arm numbness, spasms starting 2000 6/19. LKW 6/19 2000. CT/CTA H/N done w/ severe stenosis at the origin of the right internal carotid artery with filling defect in the anterior cavernous segment suspicious for critical stenosis/focal occlusion as well as moderate to severe stenosis of the proximal cervical segment and mild stenosis of the posterior cavernous segment of the left internal carotid artery. Dense left arm paresis noted at 0345AM, transferred to [facility] for potential selective cerebral angiography and further management of condition. In ED, NIHSS 6. ( Jun 20, 2019 07:01)Patient is transported to Neuro IR for a selective cerebral angiography and right carotid wall stent placement.                Upon exam patient is Awake, alert, oriented to self, place, date, situation, speech is fluent, no dysarthria, left facial, moves all extremities except, left upper extremity 0/5    Allergies: No Known Allergies  PMHX: anxiety  PSHX: no significant past surgical history  Social History:  +tobacco   FAMILY HISTORY: non-contributory     Current Medications: acetaminophen  tablet 650 milliGRAM(s) Oral every 6 hours PRN  atorvastatin 80 milliGRAM(s) Oral at bedtime  heparin  Infusion 1550 Unit(s)/Hr IV continuous   hydrALAZINE Injectable 5 milliGRAM(s) IV Push every 6 hours PRN  levETIRAcetam  IVPB 1000 milliGRAM(s) IV Intermittent every 12 hours  LORazepam     Tablet 1 milliGRAM(s) Oral once PRN  nicotine - 21 mG/24Hr(s) Patch 1 patch Transdermal daily  potassium chloride   Powder 20 milliEquivalent(s) Oral once    CBC                        13.7   10.8  )-----------( 293                   44.0       BMP    139  |  99  |  12  ----------------------------<  103  3.4<L>   |  29  |  0.93         Blood Bank: O positive available     Assessment/Plan:   This is a 53 year old right hand dominant female with symptomatic right carotid stenosis, who is transported to Neuro IR for a selective cerebral angiography and carotid stent placement. Procedure, goals, risks, benefits and alternatives were discussed with patient and patient's family. All questions were answered. Risks include but are not limited to stroke, vessel injury, hemorrhage, and or right groin hematoma. Patient demonstrates understanding of all risks involved with this procedure and wishes to continue. Appropriate content was obtained from patient and consent is in the patient's chart. Interventional Neuro Radiology  Pre-Procedure Note PA-C    This is a 53 year old right hand dominant female with a past medical history significant for anxiety, who presented to [facility] w/ left arm numbness, spasms starting 2000 6/19. LKW 6/19 2000. CT/CTA H/N done w/ severe stenosis at the origin of the right internal carotid artery with filling defect in the anterior cavernous segment suspicious for critical stenosis/focal occlusion as well as moderate to severe stenosis of the proximal cervical segment and mild stenosis of the posterior cavernous segment of the left internal carotid artery. Dense left arm paresis noted at 0345AM, transferred to [facility] for potential selective cerebral angiography and further management of condition. In ED, NIHSS 6. ( Jun 20, 2019 07:01)Patient is transported to Neuro IR for a selective cerebral angiography and right carotid wall stent placement.                Upon exam patient is awake, alert, oriented to self, place, date, situation, speech is fluent, no dysarthria, recent and remote memory intact, mild left facial, tongue is midline, moves all extremities except, right upper extremity 5/5, bilateral lower extremity 5/5, left upper extremity 0/5    Allergies: No Known Allergies  PMHX: anxiety  PSHX: no significant past surgical history  Social History:  +tobacco   FAMILY HISTORY: non-contributory     Current Medications: acetaminophen  tablet 650 milligram(s) Oral every 6 hours PRN  atorvastatin 80 milliGRAM(s) Oral at bedtime  heparin  Infusion 1550 Unit(s)/Hr IV continuous   hydrALAZINE Injectable 5 milliGRAM(s) IV Push every 6 hours PRN  levETIRAcetam  IVPB 1000 milliGRAM(s) IV Intermittent every 12 hours  LORazepam     Tablet 1 milliGRAM(s) Oral once PRN  nicotine - 21 mG/24Hr(s) Patch 1 patch Transdermal daily  potassium chloride  powder 20 milliequivalent(s) Oral once    CBC                        13.7   10.8  )-----------( 293                   44.0       BMP    139  |  99  |  12  ----------------------------<  103  3.4<L>   |  29  |  0.93         Blood Bank: O positive available     Assessment/Plan:   This is a 53 year old right hand dominant female with symptomatic right carotid stenosis, who is transported to Neuro IR for a selective cerebral angiography and carotid stent placement. Procedure, goals, risks, benefits and alternatives were discussed with patient and patient's family. All questions were answered. Risks include but are not limited to stroke, vessel injury, hemorrhage, and or right groin hematoma. Patient demonstrates understanding of all risks involved with this procedure and wishes to continue. Appropriate content was obtained from patient and consent is in the patient's chart.

## 2019-06-21 NOTE — PROGRESS NOTE ADULT - ASSESSMENT
52y/o F RH  PMH Anxiety, 40 pk/yr smoking hx(still active), does not get regular checkups presented to [facility] with left arm numbness, spasms starting 20:00 on 6/19. A phone consult was performed with [facility] and during that time she had some worsening of her neurological status but left arm weakness and spasms. At that time she was given Ativan for possible seizure. CT noncontrast and CT angiogram of head and neck revealed right frontal hypodensity suggestive of right MCA infarction in the setting of bilateral severe carotid stenosis. She was then transferred to [facility], on exam NIHSS was 6, alert, awake however unable to move her left arm. She was also noticed to have continuous twitching of left arm and face, drop in oxygen saturation for which she was put on BiPAP.    Impression: Right MCA artery ischemic infarct in the setting of large artery atherosclerosis, bilateral carotid extracranial stenosis    NEURO: Continue close monitoring for neurologic deterioration, permissive HTN to gradual normotension, LDL pending- titrate statin to LDL goal less than 70, MRI Brain w/o contrast. Noted to have jerking movements of the left hand to LUE with subsequent fascial twitching on 6/20 AM and resolved with 2mg of IV Ativan, 1g of Keppra. NSCU consulted at that time and she did not require NSCU. Spot EEG without evidence of status epilepticus. Continue on vEEG and Keppra 1g BID. Neurosurgery following for symptomatic carotid stenosis with plans for cerebral angiogram +/- stent placement. Physical therapy/OT pending.    ANTITHROMBOTIC THERAPY: Heparin gtt with goal PTT 50-70 in setting of carotid stenosis for secondary stroke prevention.     PULMONARY: CXR shows interstitial prominence likely edema. No focal infiltrates. On presentation she was noted to have flash pulmonary edema in the setting of hypertensive crisis requiring BiPAP. Started on IV 40mg Lasix to help will pulmonary congestion. Currently on high flow oxygen and protecting airway, saturating well.     CARDIOVASCULAR: TTE for evaluate for baseline cardiac assessment and will also need KEAGAN to evaluate for cardiac source of embolism, cardiac monitoring shows NSR. Cardiology following.                           SBP goal: Permissive HTN no greater than 180 due to cardiac problems followed by gradual normotension    GASTROINTESTINAL:  dysphagia screen passed     Diet: Pureed    RENAL: BUN/Cr ? good urine output      Na Goal: Greater than 135     Nichole: No    HEMATOLOGY: H/H without acute change, Platelets 293     DVT ppx: Heparin gtt    ID: afebrile, leukocytosis downtrending. No overt sign of infection- CXR without evidence of PNA, UA negative, RVP negative. BC x2 NGTD.      OTHER: Plan endorsed to patient and her family at bedside, all questions and concerns addressed. Current smoker- smoking cessation and education provided. Nicotine patch ordered.     DISPOSITION: Rehab or home depending on PT eval once stable and workup is complete    CORE MEASURES:        Admission NIHSS: 6     TPA: NO      LDL/HDL: PENDING     Depression Screen: PENDING     Statin Therapy: yes     Dysphagia Screen: PASS     Smoking YES     Afib  NO     Stroke Education YES    Obtain screening lower extremity venous ultrasound in patients who meet 1 or more of the following criteria as patient is high risk for DVT/PE on admission:   [] History of DVT/PE  []Hypercoagulable states (Factor V Leiden, Cancer, OCP, etc. )  []Prolonged immobility (hemiplegia/hemiparesis/post operative or any other extended immobilization)  [] Transferred from outside facility (Rehab or Long term care)  [] Age </= to 50 52y/o F RH  PMH Anxiety, 40 pk/yr smoking hx(still active), does not get regular checkups presented to [facility] with left arm numbness, spasms starting 20:00 on 6/19. A phone consult was performed with [facility] and during that time she had some worsening of her neurological status but left arm weakness and spasms. At that time she was given Ativan for possible seizure. CT noncontrast and CT angiogram of head and neck revealed right frontal hypodensity suggestive of right MCA infarction in the setting of bilateral severe carotid stenosis. She was then transferred to [facility], on exam NIHSS was 6, alert, awake however unable to move her left arm. She was also noticed to have continuous twitching of left arm and face, drop in oxygen saturation for which she was put on BiPAP.    Impression: Right MCA artery ischemic infarct in the setting of large artery atherosclerosis, bilateral carotid extracranial stenosis    NEURO: Neurologically without acute change, Continue close monitoring for neurologic deterioration, permissive HTN to gradual normotension, started on high intensity statin, MRI Brain w/o contrast. Noted to have jerking movements of the left hand to LUE with subsequent fascial twitching on 6/20 AM and resolved with 2mg of IV Ativan, 1g of Keppra. NSCU consulted at that time and she did not require NSCU. Spot EEG without evidence of status epilepticus. Continue on vEEG and Keppra 1g BID. Neurosurgery following for symptomatic carotid stenosis with plans for cerebral angiogram +/- stent placement with Dr. Barrios on 6/21/19. Physical therapy/OT pending.    ANTITHROMBOTIC THERAPY: Heparin gtt with goal PTT 50-70 in setting of carotid stenosis for secondary stroke prevention. Loaded with plavix 600mg prior to angiogram.     PULMONARY: CXR shows interstitial prominence likely edema. No focal infiltrates. On presentation she was noted to have flash pulmonary edema in the setting of hypertensive crisis requiring BiPAP. Given IV 40mg Lasix to help will pulmonary congestion. Currently on high flow oxygen and protecting airway, saturating well.     CARDIOVASCULAR: TTE for evaluate for baseline cardiac assessment and may also need KEAGAN to evaluate for cardiac source of embolism, cardiac monitoring shows NSR. Cardiology following.                           SBP goal: Permissive HTN no greater than 180 due to cardiac problems followed by gradual normotension    GASTROINTESTINAL:  dysphagia screen passed     Diet: Pureed    RENAL: BUN/Cr within normal limits, hypokalemia- s/p KCl supplementation, good urine output      Na Goal: Greater than 135     Nichole: No    HEMATOLOGY: H/H without acute change, Platelets 293     DVT ppx: Heparin gtt    ID: afebrile, leukocytosis downtrending. No overt sign of infection- CXR without evidence of PNA, UA negative, RVP negative. BC x2 NGTD.      OTHER: Plan endorsed to patient and her family at bedside, all questions and concerns addressed. Current smoker- smoking cessation and education provided. Nicotine patch ordered.     DISPOSITION: Rehab or home depending on PT eval once stable and workup is complete    CORE MEASURES:        Admission NIHSS: 6     TPA: NO      LDL/HDL: 71/27     Depression Screen: PENDING     Statin Therapy: yes     Dysphagia Screen: PASS     Smoking YES     Afib  NO     Stroke Education YES    Obtain screening lower extremity venous ultrasound in patients who meet 1 or more of the following criteria as patient is high risk for DVT/PE on admission:   [] History of DVT/PE  []Hypercoagulable states (Factor V Leiden, Cancer, OCP, etc. )  []Prolonged immobility (hemiplegia/hemiparesis/post operative or any other extended immobilization)  [] Transferred from outside facility (Rehab or Long term care)  [] Age </= to 50 54y/o F RH  PMH Anxiety, 40 pk/yr smoking hx(still active), does not get regular checkups presented to [facility] with left arm numbness, spasms starting 20:00 on 6/19. A phone consult was performed with [facility] and during that time she had some worsening of her neurological status but left arm weakness and spasms. At that time she was given Ativan for possible seizure. CT noncontrast and CT angiogram of head and neck revealed right frontal hypodensity suggestive of right MCA infarction in the setting of bilateral severe carotid stenosis. She was then transferred to [facility], on exam NIHSS was 6, alert, awake however unable to move her left arm. She was also noticed to have continuous twitching of left arm and face, drop in oxygen saturation for which she was put on BiPAP.    Impression:  #1 Right MCA artery ischemic infarct in the setting of large artery atherosclerosis - infarct pattern suggestive of watershed MCA -PCA and CORBIN-MCA and MCA embolic appearing infarct in the setting of Right carotid severe stenosis  #2 bilateral carotid extracranial stenosis    NEURO: Neurologically without acute change, Continue close monitoring for neurologic deterioration, permissive HTN to gradual normotension, started on high dose statin, MRI Brain w/o contrast- completed and reviewed. Noted to have jerking movements of the left hand to LUE with subsequent fascial twitching on 6/20 AM and resolved with 2mg of IV Ativan, 1g of Keppra. NSCU consulted at that time and she did not require NSCU. Spot EEG without evidence of status epilepticus. Continue on vEEG and Keppra 1g BID. Neurosurgery following for symptomatic carotid stenosis with plans for cerebral angiogram +/- stent placement with Dr. Barrios on 6/21/19. Physical therapy/OT pending.    ANTITHROMBOTIC THERAPY: Heparin gtt with goal PTT 50-70 in setting of carotid stenosis for secondary stroke prevention. Loaded with plavix 600mg prior to angiogram.     PULMONARY: CXR shows interstitial prominence likely edema. No focal infiltrates. On presentation she was noted to have flash pulmonary edema in the setting of hypertensive crisis requiring BiPAP. Given IV 40mg Lasix to help will pulmonary congestion. Currently on high flow oxygen and protecting airway, saturating well.     CARDIOVASCULAR: TTE for evaluate for baseline cardiac assessment and may also need KEAGAN to evaluate for cardiac source of embolism, cardiac monitoring shows NSR. Cardiology following.                           SBP goal: Permissive HTN no greater than 180 due to cardiac problems followed by gradual normotension    GASTROINTESTINAL:  dysphagia screen passed     Diet: Pureed    RENAL: BUN/Cr within normal limits, hypokalemia- s/p KCl supplementation, good urine output      Na Goal: Greater than 135     Nichole: No    HEMATOLOGY: H/H without acute change, Platelets 293     DVT ppx: Heparin gtt    ID: afebrile, leukocytosis downtrending. No overt sign of infection- CXR without evidence of PNA, UA negative, RVP negative. BC x2 NGTD.      OTHER: Plan endorsed to patient and her family at bedside, all questions and concerns addressed. Current smoker- smoking cessation and education provided. Nicotine patch ordered.     DISPOSITION: Rehab or home depending on PT eval once stable and workup is complete    CORE MEASURES:        Admission NIHSS: 6     TPA: NO      LDL/HDL: 71/27     Depression Screen: PENDING     Statin Therapy: yes     Dysphagia Screen: PASS     Smoking YES     Afib  NO     Stroke Education YES    Obtain screening lower extremity venous ultrasound in patients who meet 1 or more of the following criteria as patient is high risk for DVT/PE on admission:   [] History of DVT/PE  []Hypercoagulable states (Factor V Leiden, Cancer, OCP, etc. )  []Prolonged immobility (hemiplegia/hemiparesis/post operative or any other extended immobilization)  [] Transferred from outside facility (Rehab or Long term care)  [] Age </= to 50

## 2019-06-21 NOTE — PROGRESS NOTE ADULT - SUBJECTIVE AND OBJECTIVE BOX
THE PATIENT WAS SEEN AND EXAMINED BY ME WITH THE HOUSESTAFF AND STROKE TEAM DURING MORNING ROUNDS.   HPI:  54y/o F RH  PMH Anxiety, 40 pk/yr smoking hx(still active), does not get regular checkups presented to [facility] with left arm numbness, spasms starting 20:00 on 6/19. A phone consult was performed with [facility] and during that time she had some worsening of her neurological status but left arm weakness and spasms. At that time she was given Ativan for possible seizure. CT noncontrast and CT angiogram of head and neck revealed right frontal hypodensity suggestive of right MCA infarction in the setting of bilateral severe carotid stenosis. She was then transferred to [facility], on exam NIHSS was 6, alert, awake however unable to move her left arm. She was also noticed to have continuous twitching of left arm and face, drop in oxygen saturation for which she was put on BiPAP.    SUBJECTIVE: No events overnight.  No new neurologic complaints.      acetaminophen   Tablet .. 650 milliGRAM(s) Oral every 6 hours PRN  atorvastatin 80 milliGRAM(s) Oral at bedtime  heparin  Infusion 1550 Unit(s)/Hr IV Continuous <Continuous>  hydrALAZINE Injectable 5 milliGRAM(s) IV Push every 6 hours PRN  labetalol Injectable 10 milliGRAM(s) IV Push once PRN  levETIRAcetam  IVPB 1000 milliGRAM(s) IV Intermittent every 12 hours  nicotine - 21 mG/24Hr(s) Patch 1 patch Transdermal daily    PHYSICAL EXAM:   Vital Signs Last 24 Hrs  T(C): 36.5 (20 Jun 2019 16:24), Max: 36.9 (20 Jun 2019 06:26)  T(F): 97.7 (20 Jun 2019 16:24), Max: 98.5 (20 Jun 2019 06:26)  HR: 91 (21 Jun 2019 06:08) (83 - 128)  BP: 170/82 (21 Jun 2019 04:00) (134/80 - 199/113)  BP(mean): 104 (21 Jun 2019 04:00) (98 - 136)  RR: 16 (21 Jun 2019 06:08) (16 - 22)  SpO2: 100% (21 Jun 2019 06:08) (91% - 100%)    General: No acute distress  HEENT: EOM intact, visual fields full  Abdomen: Soft, nontender, nondistended   Extremities: No edema    NEUROLOGICAL EXAM:  Mental status: Awake, alert, oriented x3, follows commands, speech fluent  Cranial Nerves: No facial asymmetry, no nystagmus, no dysarthria,  tongue midline  Motor exam: 0/5 RUE, 3/5 RLE, 5/5 LUE, 5/5 LLE  Sensation: Intact to light touch   Coordination/ Gait: No dysmetria, gait not tested    LABS:                        13.7   10.8  )-----------( 293      ( 21 Jun 2019 05:56 )             44.0    06-20    138  |  99  |  15  ----------------------------<  167<H>  4.5   |  21<L>  |  1.03    Ca    9.2      20 Jun 2019 10:15    TPro  7.1  /  Alb  3.9  /  TBili  1.9<H>  /  DBili  x   /  AST  18  /  ALT  26  /  AlkPhos  76  06-20  PT/INR - ( 20 Jun 2019 16:28 )   PT: 15.8 sec;   INR: 1.36 ratio       PTT - ( 21 Jun 2019 05:56 )  PTT:69.0 sec    IMAGING: Reviewed by me.     CT Head No Cont (06.20.19 11:45)  No significant change since 6:56 AM. Subtle lucency in the   right parietal region in a portion of the right middle cerebral artery   distribution. No hemorrhage.    CT Head No Cont (06.20.19 07:25)  No interval change in right middle cerebral artery infarct involving the   right precentral gyrus and centrum semiovale. No hemorrhage. ASPECTS   score 8 out of 10.    CT Head No Cont (06.20.19)  Acute right middle cerebral artery infarct involving the right   precentral gyrus and centrum semiovale. ASPECTS score 8 out of 10. No   hemorrhage or midline shift.     CTA Head and Neck w/ IV Cont (06.20.19)  Severe stenosis at the origin of the right internal carotid artery with   filling defect in the anterior cavernous segment suspicious for critical   stenosis/focal occlusion.  Moderate to severe stenosis of the proximal cervical segment and mild   stenosis of the posterior cavernous segment of the left internal carotid   artery.  Consider conventional angiography for improved characterization of degree   of stenoses and hemodynamics. THE PATIENT WAS SEEN AND EXAMINED BY ME WITH THE HOUSESTAFF AND STROKE TEAM DURING MORNING ROUNDS.   HPI:  52y/o F RH  PMH Anxiety, 40 pk/yr smoking hx(still active), does not get regular checkups presented to [facility] with left arm numbness, spasms starting 20:00 on 6/19. A phone consult was performed with [facility] and during that time she had some worsening of her neurological status but left arm weakness and spasms. At that time she was given Ativan for possible seizure. CT noncontrast and CT angiogram of head and neck revealed right frontal hypodensity suggestive of right MCA infarction in the setting of bilateral severe carotid stenosis. She was then transferred to [facility], on exam NIHSS was 6, alert, awake however unable to move her left arm. She was also noticed to have continuous twitching of left arm and face, drop in oxygen saturation for which she was put on BiPAP.    SUBJECTIVE: No events overnight.  No new neurologic complaints.      acetaminophen   Tablet .. 650 milliGRAM(s) Oral every 6 hours PRN  atorvastatin 80 milliGRAM(s) Oral at bedtime  heparin  Infusion 1550 Unit(s)/Hr IV Continuous <Continuous>  hydrALAZINE Injectable 5 milliGRAM(s) IV Push every 6 hours PRN  labetalol Injectable 10 milliGRAM(s) IV Push once PRN  levETIRAcetam  IVPB 1000 milliGRAM(s) IV Intermittent every 12 hours  nicotine - 21 mG/24Hr(s) Patch 1 patch Transdermal daily    PHYSICAL EXAM:   Vital Signs Last 24 Hrs  T(C): 36.5 (20 Jun 2019 16:24), Max: 36.9 (20 Jun 2019 06:26)  T(F): 97.7 (20 Jun 2019 16:24), Max: 98.5 (20 Jun 2019 06:26)  HR: 91 (21 Jun 2019 06:08) (83 - 128)  BP: 170/82 (21 Jun 2019 04:00) (134/80 - 199/113)  BP(mean): 104 (21 Jun 2019 04:00) (98 - 136)  RR: 16 (21 Jun 2019 06:08) (16 - 22)  SpO2: 100% (21 Jun 2019 06:08) (91% - 100%)    General: No acute distress  HEENT: EOM intact, visual fields full  Abdomen: Soft, nontender, nondistended   Extremities: No edema    NEUROLOGICAL EXAM:  Mental status: Awake, alert, oriented x3, follows commands, speech fluent  Cranial Nerves: No facial asymmetry, no nystagmus, no dysarthria,  tongue midline  Motor exam: 0/5 RUE, 3/5 RLE with drift, 5/5 LUE, 5/5 LLE  Sensation: Intact to light touch   Coordination/ Gait: No dysmetria, gait not tested    LABS:                        13.7   10.8  )-----------( 293      ( 21 Jun 2019 05:56 )             44.0    06-20    138  |  99  |  15  ----------------------------<  167<H>  4.5   |  21<L>  |  1.03    Ca    9.2      20 Jun 2019 10:15    TPro  7.1  /  Alb  3.9  /  TBili  1.9<H>  /  DBili  x   /  AST  18  /  ALT  26  /  AlkPhos  76  06-20  PT/INR - ( 20 Jun 2019 16:28 )   PT: 15.8 sec;   INR: 1.36 ratio       PTT - ( 21 Jun 2019 05:56 )  PTT:69.0 sec    IMAGING: Reviewed by me.     MR Head No Cont (06.20.19)  Acute right frontoparietal and temporal lobe infarcts. Possible subacute   subcortical white matter infarct right frontal lobe. No acute   intracranial hemorrhage. Recommend further imaging of the vascular system   with MRAs of the brain and neck.    CT Head No Cont (06.20.19 11:45)  No significant change since 6:56 AM. Subtle lucency in the   right parietal region in a portion of the right middle cerebral artery   distribution. No hemorrhage.    CT Head No Cont (06.20.19 07:25)  No interval change in right middle cerebral artery infarct involving the   right precentral gyrus and centrum semiovale. No hemorrhage. ASPECTS   score 8 out of 10.    CT Head No Cont (06.20.19)  Acute right middle cerebral artery infarct involving the right   precentral gyrus and centrum semiovale. ASPECTS score 8 out of 10. No   hemorrhage or midline shift.     CTA Head and Neck w/ IV Cont (06.20.19)  Severe stenosis at the origin of the right internal carotid artery with   filling defect in the anterior cavernous segment suspicious for critical   stenosis/focal occlusion.  Moderate to severe stenosis of the proximal cervical segment and mild   stenosis of the posterior cavernous segment of the left internal carotid   artery.  Consider conventional angiography for improved characterization of degree   of stenoses and hemodynamics.

## 2019-06-21 NOTE — CHART NOTE - NSCHARTNOTEFT_GEN_A_CORE
Interventional Neuro- Radiology   Procedure Note PA-C    Procedure: Selective Cerebral Angiography   Pre- Procedure Diagnosis:  Post- Procedure Diagnosis:    : Dr Demetris Barrios  Fellow:     Dr Anne Marie Brock  Resident: Dr dipti Harding  Physician Assistant: JAMAICA Whitaker NP    Nurse:  Radiologic Tech:  Anesthesiologist:  Sheath:    I/Os:EBL less than 10cc  IV fluids:     cc     Urine output     cc        Contrast Omnipaque 240              Antibiotics:        Vitals: BP         HR      Spo2        Preliminary Report:  Using a ^ [place] Verdean Neuron Max sheath to the right groin under MAC sedation via   left vertebral artery,  left common carotid artery, left external carotid artey, right vertebral arter,  right common carotid artery, right external carotid artery  a selective cerebral angiography was performed and  demonstrates ________. ( Official note to follow).  Patient tolerated procedure well, hemodynamically stable, no change in neurological status compared to baseline.  Results discussed with neurosurgery, patient and patient's  family.  Right groin sheath was removed,  manual compression held to hemostasis  for  21 minutes, no active bleeding, no hematoma, avitene applied,  quick clot and safeguard balloon dressing applied at _____h. Interventional Neuro- Radiology   Procedure Note PA-C    Procedure: Selective Cerebral Angiography   Pre- Procedure Diagnosis:  Post- Procedure Diagnosis:    : Dr Demetris Barrios  Fellow:     Dr Anne Marie Brock  Resident: Dr dipti Harding  Physician Assistant: JAMAICA Whitaker NP    Nurse:  Radiologic Tech:  Anesthesiologist:  Sheath:    I/Os: EBL less than 20cc  IV fluids: 500cc    Urine output 150cc Contrast Omnipaque 240 90cc      Antibiotics: Ancef 2grams    Heparin 3,000        Vitals: /100         HR 98      Spo2 91%       Preliminary Report:  Using a 6 Irish Neuron Max sheath to the right groin under MAC sedation via   left vertebral artery,  left common carotid artery, left external carotid artey, right vertebral arter,  right common carotid artery, right external carotid artery  a selective cerebral angiography was performed and  demonstrates ________. ( Official note to follow).  Patient tolerated procedure well, hemodynamically stable, no change in neurological status compared to baseline.  Results discussed with neurosurgery, patient and patient's  family.  Right groin sheath was removed,  manual compression held to hemostasis  for  21 minutes, no active bleeding, no hematoma, avitene applied,  quick clot and safeguard balloon dressing applied at _____h. Interventional Neuro- Radiology   Procedure Note PA-C    Procedure: Selective Cerebral Angiography and right carotid wall stent placement   Pre- Procedure Diagnosis: symptomatic right carotid stenosis  Post- Procedure Diagnosis: symptomatic right carotid stenosis, status post carotid wall stent placement     : Dr Demetris Barrios  Fellow:     Dr Anne Marie Brock  Resident: Dr dipti Harding  Physician Assistant: Susan King PA-C          Jazz Rivero NP    Nurse:                     Laverne Shook RN  Radiologic Tech:     Jose Antony LRT      Anesthesiologist:     Dr Danilo Jara   Sheath:                    6 Mongolian Neuron Max sheath   Complications:          none     I/Os: EBL less than 20cc  IV fluids: 500cc    Urine output 150cc Contrast Omnipaque 240 90cc      Antibiotics: Ancef 2grams    Heparin 3,000 units IV push         Vitals: /100        HR 98      Spo2 91%       Preliminary Report:  Using a 6 Mongolian Neuron Max sheath to the right groin under MAC sedation via left common carotid artery, left internal carotid artery, right common carotid artery, right internal carotid artery, selective cerebral angiography was performed and demonstrated right carotid stenosis. Patient underwent successful right carotid wall stent placement. Official note to follow.   Patient tolerated procedure well, hemodynamically stable, no change in neurological status compared to baseline. Results discussed with neuro ICU team, patient and patient's wife. Right groin sheath was removed, star close device and manual compression was held to hemostasis for 15 minutes, no active bleeding, no hematoma, quick clot and safeguard balloon dressing applied. Interventional Neuro- Radiology   Procedure Note PA-C    Procedure: Selective Cerebral Angiography and right carotid wall stent placement   Pre- Procedure Diagnosis: symptomatic right carotid stenosis  Post- Procedure Diagnosis: symptomatic right carotid stenosis, status post carotid wall stent placement     : Dr Demetris Barrios  Fellow:     Dr Anne Marie Brock  Resident: Dr dipti Harding  Physician Assistant: Susan King PA-C        Jazz Rivero NP    Nurse:                     Laverne Shook RN  Radiologic Tech:     Jose Antony LRT      Anesthesiologist:     Dr Danilo Jara   Sheath:                    6 Maldivian Neuron Max sheath   Complications:         none     I/Os: EBL less than 20cc  IV fluids: 500cc    Urine output 150cc Contrast Omnipaque 240 90cc      Antibiotics: Ancef 2grams    Heparin 3,000 units IV push         Vitals: /100        HR 98      Spo2 91%       Preliminary Report:  Using a 6 Maldivian Neuron Max sheath to the right groin under MAC sedation via left common carotid artery, left internal carotid artery, right common carotid artery, right internal carotid artery, selective cerebral angiography was performed and demonstrated right carotid stenosis. Patient underwent successful right carotid wall stent placement. Official note to follow.   Patient tolerated procedure well, hemodynamically stable, no change in neurological status compared to baseline. Results discussed with neuro ICU team, patient and patient's wife. Right groin sheath was removed, star close device and manual compression was held to hemostasis for 15 minutes, no active bleeding, no hematoma, quick clot and safeguard balloon dressing applied.

## 2019-06-21 NOTE — DISCHARGE NOTE NURSING/CASE MANAGEMENT/SOCIAL WORK - NSDCPEPTSTRK_GEN_ALL_CORE
Call 911 for stroke/Prescribed medications/Risk factors for stroke/Signs and symptoms of stroke/Stroke education booklet/Stroke support groups for patients, families, and friends/Need for follow up after discharge/Stroke warning signs and symptoms

## 2019-06-21 NOTE — PROGRESS NOTE ADULT - SUBJECTIVE AND OBJECTIVE BOX
SUMMARY: 54yo woman s/p R common carotid artery stent today. Hypertensive and hypoxic in IR per anesthesia, [facility] for medical management and monitoring. PMH anxiety, 40 pk/yr smoking hx(still active), does not get regular checkups presented to [facility] with left arm numbness, spasms starting 20:00 on 6/19. A phone consult was performed with [facility] and during that time she had some worsening of her neurological status but left arm weakness and spasms. At that time she was given Ativan for possible seizure. CT noncontrast and CT angiogram of head and neck revealed right frontal hypodensity suggestive of right MCA infarction in the setting of bilateral severe carotid stenosis. She was then transferred to [facility], on exam NIHSS was 6, alert, awake however unable to move her left arm. She was also noticed to have continuous twitching of left arm and face, drop in oxygen saturation for which she was put on BiPAP.    REVIEW OF SYSTEMS:    VITALS: [x] Reviewed    IMAGING/DATA: [x] Reviewed    IVF FLUIDS/MEDICATIONS: [x] Reviewed    ALLERGIES: Allergies    No Known Allergies    Intolerances    DEVICES:   [] Restraints [x] PIVs [] ET tube [] central line [] PICC [x] arterial line [x] mitchell [] NGT/OGT [] EVD [] LD [] JORGE/HMV [] LiCOX [] ICP monitor [] Trach [] PEG [] Chest Tube [] other:    EXAMINATION:  General: No acute distress  HEENT: Anicteric sclerae  Cardiac: [id number]  Lungs: Clear  Abdomen: Soft, non-tender, +BS  Extremities: No c/c/e  Skin/Incision Site: Clean, dry and intact  Neurologic: Awake, alert, fully oriented, follows commands, PERRL, VFFtc, EOMI, face symmetric, tongue midline, LUE 0/5, LLE 3/5, R side full strength SUMMARY: 52yo woman s/p R common carotid artery stent today. Hypertensive and hypoxic in IR per anesthesia, [facility] for medical management and monitoring. PMH anxiety, 40 pk/yr smoking hx(still active), does not get regular checkups presented to [facility] with left arm numbness, spasms starting 20:00 on 6/19. A phone consult was performed with [facility] and during that time she had some worsening of her neurological status but left arm weakness and spasms. At that time she was given Ativan for possible seizure. CT noncontrast and CT angiogram of head and neck revealed right frontal hypodensity suggestive of right MCA infarction in the setting of bilateral severe carotid stenosis. She was then transferred to [facility], on exam NIHSS was 6, alert, awake however unable to move her left arm.    REVIEW OF SYSTEMS:    VITALS: [x] Reviewed    IMAGING/DATA: [x] Reviewed    IVF FLUIDS/MEDICATIONS: [x] Reviewed    ALLERGIES: Allergies    No Known Allergies    Intolerances    DEVICES:   [] Restraints [x] PIVs [] ET tube [] central line [] PICC [x] arterial line [x] mitchell [] NGT/OGT [] EVD [] LD [] JORGE/HMV [] LiCOX [] ICP monitor [] Trach [] PEG [] Chest Tube [] other:    EXAMINATION:  General: No acute distress  HEENT: Anicteric sclerae  Cardiac: [id number]  Lungs: Clear  Abdomen: Soft, non-tender, +BS  Extremities: No c/c/e  Skin/Incision Site: Clean, dry and intact  Neurologic: Awake, alert, fully oriented, follows commands, PERRL, VFFtc, EOMI, face symmetric, tongue midline, LUE 0/5, LLE 3/5, R side full strength SUMMARY: 52yo woman s/p R common carotid artery stent today. Hypertensive and hypoxic in IR per anesthesia, [facility] medical management and monitoring. PMH anxiety, 40 pk/yr smoking hx(still active), does not get regular checkups presented to [facility] with left arm numbness, spasms starting 20:00 on 6/19. A phone consult was performed with [facility] and during that time she had some worsening of her neurological status but left arm weakness and spasms. At that time she was given Ativan for possible seizure. CT noncontrast and CT angiogram of head and neck revealed right frontal hypodensity suggestive of right MCA infarction in the setting of bilateral severe carotid stenosis. She was then transferred to [facility], on exam NIHSS was 6, alert, awake however unable to move her left arm.    REVIEW OF SYSTEMS:    VITALS: [x] Reviewed    IMAGING/DATA: [x] Reviewed    IVF FLUIDS/MEDICATIONS: [x] Reviewed    ALLERGIES: Allergies    No Known Allergies    Intolerances    DEVICES:   [] Restraints [x] PIVs [] ET tube [] central line [] PICC [x] arterial line [x] mitchell [] NGT/OGT [] EVD [] LD [] JORGE/HMV [] LiCOX [] ICP monitor [] Trach [] PEG [] Chest Tube [] other:    EXAMINATION:  General: No acute distress  HEENT: Anicteric sclerae  Cardiac: [id number]  Lungs: Clear  Abdomen: Soft, non-tender, +BS  Extremities: No c/c/e  Skin/Incision Site: Clean, dry and intact  Neurologic: Awake, alert, fully oriented, follows commands, PERRL, VFFtc, EOMI, face symmetric, tongue midline, LUE 0/5, LLE 4+/5, R side full strength

## 2019-06-21 NOTE — DISCHARGE NOTE NURSING/CASE MANAGEMENT/SOCIAL WORK - NSDCDPATPORTLINK_GEN_ALL_CORE
You can access the [facility] Patient Portal, offered by [facility], by registering with the following website: [email or web address]

## 2019-06-21 NOTE — EEG REPORT - NS EEG TEXT BOX
[facility]   COMPREHENSIVE EPILEPSY CENTER   REPORT OF CONTINUOUS VIDEO EEG     [facility]: [address], [place] [address], Ph#: [phone]  [facility]: [id number], [place] [address], Ph#: [phone]  Office: [address] [place] [address], [place] [address], [place] [address] Ph#: [phone]    Patient Name: DEB MENDEZ  Age and : 53y ([birth year]-)  MRN #: [id number]  Location: [place] [address]  Referring Physician: Alonzo Heard    Study Date: 19 - 19    _____________________________________________________________  STUDY INFORMATION    EEG Recording Technique:  The patient underwent continuous Video-EEG monitoring, using Telemetry System hardware on the XLTek Digital System. EEG and video data were stored on a computer hard drive with important events saved in digital archive files. The material was reviewed by a physician (electroencephalographer / epileptologist) on a daily basis. Shashank and seizure detection algorithms were utilized and reviewed. An EEG Technician attended to the patient, and was available throughout daytime work hours.  The epilepsy center neurologist was available in person or on call 24-hours per day.    EEG Placement and Labeling of Electrodes:  The EEG was performed utilizing 20 channel referential EEG connections (coronal over temporal over parasagittal montage) using all standard 10-20 electrode placements with EKG, with additional electrodes placed in the inferior temporal region using the modified 10-10 montage electrode placements for elective admissions, or if deemed necessary. Recording was at a sampling rate of 256 samples per second per channel. Time synchronized digital video recording was done simultaneously with EEG recording. A low light infrared camera was used for low light recording.     _____________________________________________________________  HISTORY    Patient is a 53y old  Female who presents with a chief complaint of CVA, HF (2019 15:59)    PERTINENT MEDICATION:  levETIRAcetam  IVPB 1000 milliGRAM(s) IV Intermittent every 12 hours  _____________________________________________________________  INTERPRETATION        FINDINGS:  The background was continuous, spontaneously variable and reactive.  During wakefulness, the posteriorly dominant rhythm consisted of symmetric, well modulated  9Hz activity, with an amplitude to 30 uV, that attenuated to eye opening.  Low amplitude diffuse beta was noted in wakefulness.    Background Slowing:  Generalized slowing: none.  Focal slowing: none was present.    Sleep Background:  Drowsiness was characterized by fragmentation, attenuation, and slowing of the background activity.    Sleep was characterized by the presence of vertex waves, symmetric spindles, and K-complexes.    Epileptiform Activity:   No epileptiform discharges were present.    Events:  No clinical events were recorded.  No seizures were recorded.    Activation Procedures:   -Hyperventilation was not performed.    -Photic stimulation was not performed.    Artifacts:  Intermittent myogenic and movement artifacts were noted.    ECG:  The heart rate on single channel ECG at baseline was predominantly near BPM =70-80  -----------------------------------------------------------------------------------------------------    EEG Classification / Summary:  Normal EEG study, awake / drowsy / asleep    -----------------------------------------------------------------------------------------------------    Clinical Impression:  Normal EEG.  There were no epileptiform abnormalities or seizures  recorded.          Preliminary Fellow Read:    Iza King MD  Adult EEG Fellow, [facility] Epilepsy [place] [facility]   COMPREHENSIVE EPILEPSY CENTER   REPORT OF CONTINUOUS VIDEO EEG     [facility]: [address], [place] [address], Ph#: [phone]  [facility]: [id number], [place] [address], Ph#: [phone]  Office: [address] [place] [address], [place] [address], [place] [address] Ph#: [phone]    Patient Name: DEB MENDEZ  Age and : 53y ([birth year]-)  MRN #: [id number]  Location: [place] [address]  Referring Physician: Alonzo Heard    Study Date: 19 to 19    _____________________________________________________________  STUDY INFORMATION    EEG Recording Technique:  The patient underwent continuous Video-EEG monitoring, using Telemetry System hardware on the XLTek Digital System. EEG and video data were stored on a computer hard drive with important events saved in digital archive files. The material was reviewed by a physician (electroencephalographer / epileptologist) on a daily basis. Shashank and seizure detection algorithms were utilized and reviewed. An EEG Technician attended to the patient, and was available throughout daytime work hours.  The epilepsy center neurologist was available in person or on call 24-hours per day.    EEG Placement and Labeling of Electrodes:  The EEG was performed utilizing 20 channel referential EEG connections (coronal over temporal over parasagittal montage) using all standard 10-20 electrode placements with EKG, with additional electrodes placed in the inferior temporal region using the modified 10-10 montage electrode placements for elective admissions, or if deemed necessary. Recording was at a sampling rate of 256 samples per second per channel. Time synchronized digital video recording was done simultaneously with EEG recording. A low light infrared camera was used for low light recording.     _____________________________________________________________  HISTORY    Patient is a 53y old  Female who presents with a chief complaint of CVA, HF (2019 15:59)    PERTINENT MEDICATION:  levETIRAcetam  IVPB 1000 milliGRAM(s) IV Intermittent every 12 hours  _____________________________________________________________  INTERPRETATION    FINDINGS:  The background was continuous, spontaneously variable and reactive.  During wakefulness, the posteriorly dominant rhythm consisted of symmetric, well modulated  9Hz activity, with an amplitude to 30 uV, that attenuated to eye opening.  Low amplitude diffuse beta was noted in wakefulness.    Background Slowing:  Generalized slowing: none.  Focal slowing: none was present.    Sleep Background:  Drowsiness was characterized by fragmentation, attenuation, and slowing of the background activity.    Sleep was characterized by the presence of vertex waves, symmetric spindles, and K-complexes.    Epileptiform Activity:   No epileptiform discharges were present.    Events:  No clinical events were recorded.  No seizures were recorded.    Activation Procedures:   -Hyperventilation was not performed.    -Photic stimulation was not performed.    Artifacts:  Intermittent myogenic and movement artifacts were noted.    ECG:  The heart rate on single channel ECG at baseline was predominantly near BPM =70-80  -----------------------------------------------------------------------------------------------------    EEG Classification / Summary:  Normal EEG study, awake / drowsy / asleep    -----------------------------------------------------------------------------------------------------    Clinical Impression:  Normal EEG.  There were no epileptiform abnormalities or seizures  recorded.      Iza King MD  Adult EEG Fellow, [facility] Epilepsy [place]

## 2019-06-21 NOTE — PROGRESS NOTE ADULT - ASSESSMENT
s/p R common carotid stent    Neuro:  ASA/plavix  carotid dopplers per neurosurgery  stroke neurology following  stroke core measures  continue keppra  flat for 4hrs/monitor R groin  PT/OT/OOB as tolerated tomorrow    Cards:  hypertension --undiagnosed vs. anxiety (in 240's in IR)  cardiology following for r/o ACS, no urgent intervention, likely next week  -160 cardene gtt    Pulm:  BiPAP overnight, wean to high flow/NC as able  duonebs Q6 and pulmicort    GI:  dysphagia screen  bowel regimen    Renal:  IVL  strict I/O per cardiology    Endo:  maintain euglycemia    Heme/onc:  SCDs  hold chemoppx fresh post op  ASA/plavix    ID:  monitor for fevers  UA sent in IR w/ mitchell insertion for foul appearing urine per IR    full code  ICU    at risk for deterioration/death due hypertensive emergency, post stent hemorrhage, hypoxia, respiratory failure  critical care time 45min s/p R common carotid stent    Neuro:  ASA/plavix  check P2Y12 at 10am tomorrow per neuroIR  carotid dopplers per neurosurgery  stroke neurology following  stroke core measures  continue keppra  flat for 4hrs/monitor R groin  PT/OT/OOB as tolerated tomorrow    Cards:  hypertension --undiagnosed vs. anxiety (in 240's in IR)  cardiology following for r/o ACS, no urgent intervention, likely next week  -140 per neuro IR, cardene gtt prn     Pulm:  BiPAP overnight, wean to high flow/NC as able  duonebs Q6 and pulmicort    GI:  dysphagia screen  bowel regimen    Renal:  IVL  strict I/O per cardiology    Endo:  maintain euglycemia    Heme/onc:  SCDs  hold chemoppx fresh post op  ASA/plavix    ID:  monitor for fevers  UA sent in IR w/ mitchell insertion for foul appearing urine per IR    full code  ICU    at risk for deterioration/death due hypertensive emergency, post stent hemorrhage, hypoxia, respiratory failure  critical care time 45min

## 2019-06-21 NOTE — PROGRESS NOTE ADULT - SUBJECTIVE AND OBJECTIVE BOX
Cardiology Follow Up  ==========================================  CC: CVA, CM, HTN,    HPI: Patient volume status better today. Stable on NC. No chest pain. No SOB.     Review Of Systems:  Negative except as documented above    Medications:    acetaminophen  IVPB ..   400 mL/Hr IV Intermittent (06-21-19 @ 02:35)    atorvastatin   80 milliGRAM(s) Oral (06-20-19 @ 22:06)    clopidogrel Tablet   600 milliGRAM(s) Oral (06-21-19 @ 11:53)    furosemide   Injectable   20 milliGRAM(s) IV Push (06-20-19 @ 17:09)    heparin  Infusion   15.5 mL/Hr IV Continuous (06-20-19 @ 23:41)    labetalol Injectable   10 milliGRAM(s) IV Push (06-21-19 @ 12:31)    levETIRAcetam  IVPB   400 mL/Hr IV Intermittent (06-21-19 @ 05:55)   400 mL/Hr IV Intermittent (06-20-19 @ 19:03)    LORazepam     Tablet   1 milliGRAM(s) Oral (06-20-19 @ 18:27)    nicotine - 21 mG/24Hr(s) Patch   1 Patch Transdermal (06-21-19 @ 11:53)   1 Patch Transdermal (06-20-19 @ 17:05)    potassium chloride  10 mEq/100 mL IVPB   100 mL/Hr IV Intermittent (06-21-19 @ 11:52)        Telemetry: NSR.     Vital Signs Last 24 Hrs  T(C): 37.1 (21 Jun 2019 08:34), Max: 37.1 (21 Jun 2019 08:34)  T(F): 98.7 (21 Jun 2019 08:34), Max: 98.7 (21 Jun 2019 08:34)  HR: 100 (21 Jun 2019 10:37) (83 - 110)  BP: 187/126 (21 Jun 2019 12:16) (134/80 - 187/126)  BP(mean): 123 (21 Jun 2019 10:00) (98 - 131)  RR: 17 (21 Jun 2019 08:00) (16 - 22)  SpO2: 100% (21 Jun 2019 10:37) (91% - 100%)  I&O's Summary      Physical Exam:  General: [x ] NAD  HEENT: [x ] EOMI [ x] PERRL  Cor: [x ] S1,S2 [x ] RRR [x ] No M/R/G   Lungs: [x ] CTA B/L [ x] Normal effort  Abd: [x ] Soft [x ] Non-tender [x ] +BS  Ext: [ x] No edema [x ] No cyanosis    Labs:                        13.7   10.8  )-----------( 293      ( 21 Jun 2019 05:56 )             44.0     06-21    139  |  99  |  12  ----------------------------<  103<H>  3.4<L>   |  29  |  0.93    Ca    8.8      21 Jun 2019 06:52    TPro  7.1  /  Alb  3.9  /  TBili  1.9<H>  /  DBili  x   /  AST  18  /  ALT  26  /  AlkPhos  76  06-20    Troponin T, High Sensitivity Result: 28 ng/L (06-20-19 @ 06:56)

## 2019-06-22 LAB
APPEARANCE UR: CLEAR — SIGNIFICANT CHANGE UP
BACTERIA # UR AUTO: ABNORMAL
BILIRUB UR-MCNC: NEGATIVE — SIGNIFICANT CHANGE UP
COLOR SPEC: YELLOW — SIGNIFICANT CHANGE UP
DIFF PNL FLD: SIGNIFICANT CHANGE UP
EPI CELLS # UR: 1 /HPF — SIGNIFICANT CHANGE UP (ref 0–5)
GAS PNL BLDA: SIGNIFICANT CHANGE UP
GLUCOSE UR QL: NEGATIVE — SIGNIFICANT CHANGE UP
HYALINE CASTS # UR AUTO: 0 /LPF — SIGNIFICANT CHANGE UP (ref 0–7)
KETONES UR-MCNC: NEGATIVE — SIGNIFICANT CHANGE UP
LEUKOCYTE ESTERASE UR-ACNC: NEGATIVE — SIGNIFICANT CHANGE UP
NITRITE UR-MCNC: NEGATIVE — SIGNIFICANT CHANGE UP
PA ADP PRP-ACNC: 84 PRU — LOW (ref 194–417)
PH UR: 6.5 — SIGNIFICANT CHANGE UP (ref 5–8)
PROT UR-MCNC: ABNORMAL
RBC CASTS # UR COMP ASSIST: 2 /HPF — SIGNIFICANT CHANGE UP (ref 0–4)
SP GR SPEC: 1.01 — SIGNIFICANT CHANGE UP (ref 1.01–1.02)
UROBILINOGEN FLD QL: SIGNIFICANT CHANGE UP
WBC UR QL: 5 /HPF — SIGNIFICANT CHANGE UP (ref 0–5)

## 2019-06-22 PROCEDURE — 99233 SBSQ HOSP IP/OBS HIGH 50: CPT

## 2019-06-22 RX ORDER — LEVETIRACETAM 250 MG/1
1000 TABLET, FILM COATED ORAL
Refills: 0 | Status: DISCONTINUED | OUTPATIENT
Start: 2019-06-22 | End: 2019-06-27

## 2019-06-22 RX ORDER — HYDRALAZINE HCL 50 MG
5 TABLET ORAL
Refills: 0 | Status: COMPLETED | OUTPATIENT
Start: 2019-06-22 | End: 2019-06-25

## 2019-06-22 RX ORDER — CLOPIDOGREL BISULFATE 75 MG/1
75 TABLET, FILM COATED ORAL DAILY
Refills: 0 | Status: DISCONTINUED | OUTPATIENT
Start: 2019-06-22 | End: 2019-06-27

## 2019-06-22 RX ORDER — CALCIUM GLUCONATE 100 MG/ML
2 VIAL (ML) INTRAVENOUS ONCE
Refills: 0 | Status: COMPLETED | OUTPATIENT
Start: 2019-06-22 | End: 2019-06-22

## 2019-06-22 RX ORDER — ENOXAPARIN SODIUM 100 MG/ML
30 INJECTION SUBCUTANEOUS
Refills: 0 | Status: DISCONTINUED | OUTPATIENT
Start: 2019-06-22 | End: 2019-06-27

## 2019-06-22 RX ORDER — ASPIRIN/CALCIUM CARB/MAGNESIUM 324 MG
325 TABLET ORAL DAILY
Refills: 0 | Status: DISCONTINUED | OUTPATIENT
Start: 2019-06-22 | End: 2019-06-27

## 2019-06-22 RX ADMIN — Medication 0.25 MILLIGRAM(S): at 06:30

## 2019-06-22 RX ADMIN — Medication 200 GRAM(S): at 05:10

## 2019-06-22 RX ADMIN — LEVETIRACETAM 400 MILLIGRAM(S): 250 TABLET, FILM COATED ORAL at 05:00

## 2019-06-22 RX ADMIN — Medication 1 PATCH: at 07:15

## 2019-06-22 RX ADMIN — LEVETIRACETAM 400 MILLIGRAM(S): 250 TABLET, FILM COATED ORAL at 17:31

## 2019-06-22 RX ADMIN — Medication 3 MILLILITER(S): at 18:04

## 2019-06-22 RX ADMIN — Medication 1 PATCH: at 12:19

## 2019-06-22 RX ADMIN — Medication 3 MILLILITER(S): at 12:04

## 2019-06-22 RX ADMIN — Medication 50 GRAM(S): at 01:37

## 2019-06-22 RX ADMIN — Medication 650 MILLIGRAM(S): at 07:15

## 2019-06-22 RX ADMIN — Medication 5 MILLIGRAM(S): at 06:39

## 2019-06-22 RX ADMIN — ENOXAPARIN SODIUM 30 MILLIGRAM(S): 100 INJECTION SUBCUTANEOUS at 17:32

## 2019-06-22 RX ADMIN — ATORVASTATIN CALCIUM 80 MILLIGRAM(S): 80 TABLET, FILM COATED ORAL at 21:06

## 2019-06-22 RX ADMIN — Medication 1 PATCH: at 20:16

## 2019-06-22 RX ADMIN — Medication 325 MILLIGRAM(S): at 10:06

## 2019-06-22 RX ADMIN — CHLORHEXIDINE GLUCONATE 1 APPLICATION(S): 213 SOLUTION TOPICAL at 21:06

## 2019-06-22 RX ADMIN — Medication 3 MILLILITER(S): at 06:30

## 2019-06-22 RX ADMIN — Medication 650 MILLIGRAM(S): at 06:45

## 2019-06-22 RX ADMIN — Medication 3 MILLILITER(S): at 23:26

## 2019-06-22 RX ADMIN — Medication 3 MILLILITER(S): at 00:19

## 2019-06-22 RX ADMIN — Medication 0.25 MILLIGRAM(S): at 18:04

## 2019-06-22 RX ADMIN — CLOPIDOGREL BISULFATE 75 MILLIGRAM(S): 75 TABLET, FILM COATED ORAL at 10:06

## 2019-06-22 NOTE — PROGRESS NOTE ADULT - ASSESSMENT
54y/o F RH  PMH Anxiety, 40 pk/yr smoking hx(still active), does not get regular checkups presented to [facility] with left arm numbness, spasms starting 20:00 on 6/19. A phone consult was performed with [facility] and during that time she had some worsening of her neurological status but left arm weakness and spasms. At that time she was given Ativan for possible seizure. CT noncontrast and CT angiogram of head and neck revealed right frontal hypodensity suggestive of right MCA infarction in the setting of bilateral severe carotid stenosis. She was then transferred to [facility], on exam NIHSS was 6, alert, awake however unable to move her left arm. She was also noticed to have continuous twitching of left arm and face, drop in oxygen saturation for which she was put on BiPAP.    Impression:  #1 Right MCA artery ischemic infarct in the setting of large artery atherosclerosis - infarct pattern suggestive of watershed MCA -PCA and CORBIN-MCA and MCA embolic appearing infarct in the setting of Right carotid severe stenosis  #2 bilateral carotid extracranial stenosis    NEURO: Neurologically without acute change, Continue close monitoring for neurologic deterioration, -140 to gradual normotension, continue high dose statin, MRI Brain w/o contrast- completed and reviewed. Noted to have jerking movements of the left hand to LUE with subsequent fascial twitching on 6/20 AM and resolved with 2mg of IV Ativan, 1g of Keppra. VEEG with  no epileptiform abnormalities or seizures recorded. Continue Keppra 1g BID. s/p right SANDIE stent place on 6/21/19. Plan to obtain carotid doppler to eval surgical site. Physical therapy/OT with recommendations for AR, continue current rehab treatment.    ANTITHROMBOTIC THERAPY: ASA/plavix s/p SANDIE stent. P2Y12 as per [facility]    PULMONARY: CXR(06/20/19) Limited evaluation of the left lung likely secondary to poor penetration and/or overlying soft tissue. Low lung volumes. No focal consolidation. BiPAP overnight, wean to high flow/NC as able, saturating well.     CARDIOVASCULAR: TTE for evaluate for baseline cardiac assessment and may also need KEAGAN to evaluate for cardiac source of embolism, cardiac monitoring shows bradycardia HR 36-39 on 06/22/19. Dr Suma quinones (Cardiology) appreciated     SBP goal: -140    GASTROINTESTINAL:  dysphagia screen passed     Diet: Pureed    RENAL: BUN/Cr within normal limits, hypokalemia- resolved, good urine output      Na Goal: Greater than 135     Nichole: No    HEMATOLOGY: H/H without acute change, Platelets 295     DVT ppx: Heparin gtt    ID: afebrile, leukocytosis likely reactive, UA: Negaive. BC x2 NGTD, will continue to monitor.      OTHER: Plan endorsed to patient and her family at bedside, all questions and concerns addressed. Current smoker- smoking cessation and education provided. Nicotine patch ordered. LUE splint ordered    DISPOSITION: AR as per PMR eval once stable and workup is complete    CORE MEASURES:        Admission NIHSS: 6     TPA: NO      LDL/HDL: 71/27     Depression Screen: PENDING     Statin Therapy: yes     Dysphagia Screen: PASS     Smoking YES     Afib  NO     Stroke Education YES    Obtain screening lower extremity venous ultrasound in patients who meet 1 or more of the following criteria as patient is high risk for DVT/PE on admission:   [] History of DVT/PE  []Hypercoagulable states (Factor V Leiden, Cancer, OCP, etc. )  []Prolonged immobility (hemiplegia/hemiparesis/post operative or any other extended immobilization)  [] Transferred from outside facility (Rehab or Long term care)  [] Age </= to 50 52y/o F RH  PMH Anxiety, 40 pk/yr smoking hx(still active), does not get regular checkups presented to [facility] with left arm numbness, spasms starting 20:00 on 6/19. A phone consult was performed with [facility] and during that time she had some worsening of her neurological status but left arm weakness and spasms. At that time she was given Ativan for possible seizure. CT noncontrast and CT angiogram of head and neck revealed right frontal hypodensity suggestive of right MCA infarction in the setting of bilateral severe carotid stenosis. She was then transferred to [facility], on exam NIHSS was 6, alert, awake however unable to move her left arm. She was also noticed to have continuous twitching of left arm and face, drop in oxygen saturation for which she was put on BiPAP.    Impression:  #1 Right MCA artery ischemic infarct in the setting of large artery atherosclerosis - infarct pattern suggestive of watershed MCA -PCA and CORBIN-MCA and MCA embolic appearing infarct in the setting of Right carotid severe stenosis  #2 bilateral carotid extracranial stenosis    NEURO: Pt was hypertensive on 06/22/19, and was transferred [facility] care, Neurologically without acute change, Continue close monitoring for neurologic deterioration, -140 to gradual normotension, continue high dose statin, MRI Brain w/o contrast- completed and reviewed. Noted to have jerking movements of the left hand to LUE with subsequent fascial twitching on 6/20 AM and resolved with 2mg of IV Ativan, 1g of Keppra. VEEG with  no epileptiform abnormalities or seizures recorded. Continue Keppra 1g BID. s/p right SANDIE stent place on 6/21/19. Plan to obtain carotid doppler to eval surgical site. Physical therapy/OT with recommendations for AR, continue current rehab treatment.    ANTITHROMBOTIC THERAPY: ASA/plavix s/p SANDIE stent. P2Y12 as per NSCU    PULMONARY: CXR(06/20/19) Limited evaluation of the left lung likely secondary to poor penetration and/or overlying soft tissue. Low lung volumes. No focal consolidation. BiPAP overnight, wean to high flow/NC as able, saturating well.     CARDIOVASCULAR: TTE for evaluate for baseline cardiac assessment and may also need KEAGAN to evaluate for cardiac source of embolism, Pt was hypertensive on 06/22/19, and was transferred to [facility] for further care, cardiac monitoring shows bradycardia HR 36-39 on 06/22/19. Dr Suma quinones (Cardiology) appreciated     SBP goal: -140    GASTROINTESTINAL:  dysphagia screen passed     Diet: Pureed    RENAL: BUN/Cr within normal limits, hypokalemia- resolved, good urine output      Na Goal: Greater than 135     Nichole: No    HEMATOLOGY: H/H without acute change, Platelets 295     DVT ppx: Heparin gtt    ID: afebrile, leukocytosis likely reactive, UA: Negative. BC x2 NGTD, will continue to monitor.      OTHER: Plan endorsed to patient and her family at bedside, all questions and concerns addressed. Current smoker- smoking cessation and education provided. Nicotine patch ordered. LUE splint ordered    DISPOSITION: AR as per PMR stacie once stable and workup is complete    CORE MEASURES:        Admission NIHSS: 6     TPA: NO      LDL/HDL: 71/27     Depression Screen: PENDING     Statin Therapy: yes     Dysphagia Screen: PASS     Smoking YES     Afib  NO     Stroke Education YES    Obtain screening lower extremity venous ultrasound in patients who meet 1 or more of the following criteria as patient is high risk for DVT/PE on admission:   [] History of DVT/PE  []Hypercoagulable states (Factor V Leiden, Cancer, OCP, etc. )  []Prolonged immobility (hemiplegia/hemiparesis/post operative or any other extended immobilization)  [] Transferred from outside facility (Rehab or Long term care)  [] Age </= to 50

## 2019-06-22 NOTE — PROVIDER CONTACT NOTE (OTHER) - BACKGROUND
CA
Patient s/p Rt ICA stent placement
Pt admitted for R ICA thrombus w/ worsening LUE weakness
R ICA
S/p rt ICA stent placement

## 2019-06-22 NOTE — PROGRESS NOTE ADULT - SUBJECTIVE AND OBJECTIVE BOX
nightly round performed  blood pressure better controlled  hypoxia resolved, continue duonebs/pulmocort  awaiting transfer to stroke unit pending bed availability

## 2019-06-22 NOTE — PROVIDER CONTACT NOTE (OTHER) - SITUATION
10 sec  pt had a few twitching movemens on left arm from shoulder during blood draw. TARA Saldaña made aware no new orders
Patients HR 39
Pt admitted from ED exhibiting significant LUE/LLE shaking extending into the left neck and face. Pt verbalizes difficulty breathing and discomfort from arm shaking.
pt with elevated BP SBP DBP
Pts HR 36

## 2019-06-22 NOTE — PROVIDER CONTACT NOTE (OTHER) - RECOMMENDATIONS
Notify provider, perform necessary tests, labs.
eval/tx
few twitcing movements pt sates movement uncontrollable . Unable to make voluntary movements RUE
Assess patient at bedside. Provide appropriate tests/medications.

## 2019-06-22 NOTE — PROGRESS NOTE ADULT - SUBJECTIVE AND OBJECTIVE BOX
HPI:  52y/o F RH  PMH Anxiety, 40 pk/yr smoking hx(still active), does not get regular checkups presented to [facility] w/ L arm numbness, spasms starting 2000 6/19. LKW 6/19 2000. CT/CTA H/N done w/ severe stenosis at the origin of the right internal carotid artery with filling defect in the anterior cavernous segment suspicious for critical stenosis/focal occlusion as well as moderate to severe stenosis of the proximal cervical segment and mild stenosis of the posterior cavernous segment of the left internal carotid artery. Dense L arm paresis noted at 0345AM, transferred to [facility] for potential angio and further management of condition. On initial assessment in ED, NIHSS 6. (20 Jun 2019 07:01)    OVERNIGHT EVENTS:   No acute events overnight.    VITALS:  T(C): , Max: 36.9 (06-21-19 @ 23:00)  HR:  (39 - 104)  BP:  (161/115 - 187/126)  ABP:  (99/615 - 152/76)  RR:  (11 - 26)  SpO2:  (93% - 100%)  Wt(kg): --      06-21-19 @ 07:01  -  06-22-19 @ 07:00  --------------------------------------------------------  IN: 372.5 mL / OUT: 625 mL / NET: -252.5 mL      LABS:  Na: 138 (06-21 @ 21:53), 139 (06-21 @ 06:52), 138 (06-20 @ 10:15), 140 (06-20 @ 06:56), 136 (06-20 @ 00:17)  K: 4.0 (06-21 @ 21:53), 3.4 (06-21 @ 06:52), 4.5 (06-20 @ 10:15), 4.0 (06-20 @ 06:56), 3.7 (06-20 @ 00:17)  Cl: 101 (06-21 @ 21:53), 99 (06-21 @ 06:52), 99 (06-20 @ 10:15), 100 (06-20 @ 06:56), 103 (06-20 @ 00:17)  CO2: 23 (06-21 @ 21:53), 29 (06-21 @ 06:52), 21 (06-20 @ 10:15), 24 (06-20 @ 06:56), 24 (06-20 @ 00:17)  BUN: 13 (06-21 @ 21:53), 12 (06-21 @ 06:52), 15 (06-20 @ 10:15), 15 (06-20 @ 06:56), 19 (06-20 @ 00:17)  Cr: 0.93 (06-21 @ 21:53), 0.93 (06-21 @ 06:52), 1.03 (06-20 @ 10:15), 0.99 (06-20 @ 06:56), 1.10 (06-20 @ 00:17)  Glu: 110(06-21 @ 21:53), 103(06-21 @ 06:52), 167(06-20 @ 10:15), 122(06-20 @ 06:56), 154(06-20 @ 00:17)    Hgb: 13.3 (06-21 @ 21:53), 13.7 (06-21 @ 05:56), 15.2 (06-21 @ 00:20), 15.9 (06-20 @ 10:15), 15.4 (06-20 @ 06:56), 15.3 (06-20 @ 00:17)  Hct: 41.3 (06-21 @ 21:53), 44.0 (06-21 @ 05:56), 51.3 (06-21 @ 00:20), 51.5 (06-20 @ 10:15), 47.8 (06-20 @ 06:56), 49.9 (06-20 @ 00:17)  WBC: 12.2 (06-21 @ 21:53), 10.8 (06-21 @ 05:56), 12.67 (06-21 @ 00:20), 16.9 (06-20 @ 10:15), 15.8 (06-20 @ 06:56), 17.88 (06-20 @ 00:17)  Plt: 295 (06-21 @ 21:53), 293 (06-21 @ 05:56), 352 (06-21 @ 00:20), 397 (06-20 @ 10:15), 334 (06-20 @ 06:56), 397 (06-20 @ 00:17)    INR: 1.36 06-20-19 @ 16:28, 1.33 06-20-19 @ 10:15, 1.35 06-20-19 @ 06:56, 1.29 06-20-19 @ 00:17  PTT: 46.6 06-21-19 @ 12:25, 69.0 06-21-19 @ 05:56, 39.3 06-20-19 @ 22:46, 23.7 06-20-19 @ 16:28, 25.2 06-20-19 @ 10:15, 27.3 06-20-19 @ 06:56, 31.7 06-20-19 @ 00:17    IMAGING:   Recent imaging studies were reviewed.    MEDICATIONS:  acetaminophen   Tablet .. 650 milliGRAM(s) Oral every 6 hours PRN  ALBUTerol/ipratropium for Nebulization 3 milliLiter(s) Nebulizer every 6 hours  aspirin 325 milliGRAM(s) Oral daily  atorvastatin 80 milliGRAM(s) Oral at bedtime  buDESOnide   0.25 milliGRAM(s) Respule 0.25 milliGRAM(s) Inhalation every 12 hours  chlorhexidine 4% Liquid 1 Application(s) Topical <User Schedule>  clopidogrel Tablet 75 milliGRAM(s) Oral daily  hydrALAZINE Injectable 5 milliGRAM(s) IV Push every 3 hours PRN  levETIRAcetam  IVPB 1000 milliGRAM(s) IV Intermittent every 12 hours  nicotine - 21 mG/24Hr(s) Patch 1 patch Transdermal daily  potassium chloride   Powder 20 milliEquivalent(s) Oral once    EXAMINATION:  General:  calm  HEENT:  MMM  Neuro:  awake, alert, oriented x 3, follows commands, moves all extremities  Cards:  RRR  Respiratory:  no respiratory distress  Adomen:  soft  Extremities:  no edema  Skin:  warm/dry

## 2019-06-22 NOTE — PROVIDER CONTACT NOTE (OTHER) - ASSESSMENT
AxOx3
Patients HR 39, No further exam changes. Pt denies chest pain
VS: /122, , RR 25, 02 Sat 95. Pt alert and oriented x3. Following commands, speaking to providers. Denies pain.
pt alert JOVANY mena denies pain
Pt HR 36. Patient lethargic however no further exam changes noted. Pt denies chest pain

## 2019-06-22 NOTE — PROGRESS NOTE ADULT - SUBJECTIVE AND OBJECTIVE BOX
THE PATIENT WAS SEEN AND EXAMINED BY ME WITH THE HOUSESTAFF AND STROKE TEAM DURING MORNING ROUNDS.   HPI:  54y/o F RH  PMH Anxiety, 40 pk/yr smoking hx(still active), does not get regular checkups presented to [facility] with left arm numbness, spasms starting 20:00 on 6/19. A phone consult was performed with [facility] and during that time she had some worsening of her neurological status but left arm weakness and spasms. At that time she was given Ativan for possible seizure. CT noncontrast and CT angiogram of head and neck revealed right frontal hypodensity suggestive of right MCA infarction in the setting of bilateral severe carotid stenosis. She was then transferred to [facility], on exam NIHSS was 6, alert, awake however unable to move her left arm. She was also noticed to have continuous twitching of left arm and face, drop in oxygen saturation for which she was put on BiPAP.    SUBJECTIVE: No events overnight.  No new neurologic complaints.      acetaminophen   Tablet .. 650 milliGRAM(s) Oral every 6 hours PRN  ALBUTerol/ipratropium for Nebulization 3 milliLiter(s) Nebulizer every 6 hours  aspirin 325 milliGRAM(s) Oral daily  atorvastatin 80 milliGRAM(s) Oral at bedtime  buDESOnide   0.25 milliGRAM(s) Respule 0.25 milliGRAM(s) Inhalation every 12 hours  chlorhexidine 4% Liquid 1 Application(s) Topical <User Schedule>  clopidogrel Tablet 75 milliGRAM(s) Oral daily  hydrALAZINE Injectable 5 milliGRAM(s) IV Push every 3 hours PRN  levETIRAcetam  IVPB 1000 milliGRAM(s) IV Intermittent every 12 hours  nicotine - 21 mG/24Hr(s) Patch 1 patch Transdermal daily  potassium chloride   Powder 20 milliEquivalent(s) Oral once      PHYSICAL EXAM:   Vital Signs Last 24 Hrs  T(C): 36.8 (22 Jun 2019 07:00), Max: 37.1 (21 Jun 2019 08:34)  T(F): 98.3 (22 Jun 2019 07:00), Max: 98.7 (21 Jun 2019 08:34)  HR: 65 (22 Jun 2019 07:00) (39 - 104)  BP: 166/95 (21 Jun 2019 16:00) (161/115 - 187/126)  BP(mean): 113 (21 Jun 2019 16:00) (113 - 139)  RR: 13 (22 Jun 2019 07:00) (11 - 26)  SpO2: 100% (22 Jun 2019 07:00) (93% - 100%)    General: No acute distress  HEENT: EOM intact, visual fields full  Abdomen: Soft, nontender, nondistended   Extremities: No edema    NEUROLOGICAL EXAM:  Mental status: Awake, alert, oriented x3, follows commands, speech fluent  Cranial Nerves: No facial asymmetry, no nystagmus, no dysarthria,  tongue midline  Motor exam: 0/5 RUE, 3/5 RLE with drift, 5/5 LUE, 5/5 LLE  Sensation: Intact to light touch   Coordination/ Gait: No dysmetria, gait not tested      LABS:                        13.3   12.2  )-----------( 295      ( 21 Jun 2019 21:53 )             41.3    06-21    138  |  101  |  13  ----------------------------<  110<H>  4.0   |  23  |  0.93    Ca    8.4      21 Jun 2019 21:53  Phos  4.4     06-21  Mg     1.6     06-21    PT/INR - ( 20 Jun 2019 16:28 )   PT: 15.8 sec;   INR: 1.36 ratio         PTT - ( 21 Jun 2019 12:25 )  PTT:46.6 sec  Hemoglobin A1C, Whole Blood: 6.2 % (06-21 @ 11:12)      IMAGING: Reviewed by me.     VEEG (06/21/19) Normal EEG. There were no epileptiform abnormalities or seizures recorded.     MR Head No Cont (06.20.19) Acute right frontoparietal and temporal lobe infarcts. Possible subacute subcortical white matter infarct right frontal lobe. No acute intracranial hemorrhage. Recommend further imaging of the vascular system with MRAs of the brain and neck.    CT Head No Cont (06.20.19 11:45) No significant change since 6:56 AM. Subtle lucency in the right parietal region in a portion of the right middle cerebral artery distribution. No hemorrhage.    CT Head No Cont (06.20.19 07:25) No interval change in right middle cerebral artery infarct involving the right precentral gyrus and centrum semiovale. No hemorrhage. ASPECTS score 8 out of 10.    CT Head No Cont (06.20.19) Acute right middle cerebral artery infarct involving the right precentral gyrus and centrum semiovale. ASPECTS score 8 out of 10. No hemorrhage or midline shift.     CTA Head and Neck w/ IV Cont (06.20.19) Severe stenosis at the origin of the right internal carotid artery with filling defect in the anterior cavernous segment suspicious for critical stenosis/focal occlusion.  Moderate to severe stenosis of the proximal cervical segment and mild stenosis of the posterior cavernous segment of the left internal carotid artery. THE PATIENT WAS SEEN AND EXAMINED BY ME WITH THE HOUSESTAFF AND STROKE TEAM DURING MORNING ROUNDS.   HPI:  54y/o F RH  PMH Anxiety, 40 pk/yr smoking hx(still active), does not get regular checkups presented to [facility] with left arm numbness, spasms starting 20:00 on 6/19. A phone consult was performed with [facility] and during that time she had some worsening of her neurological status but left arm weakness and spasms. At that time she was given Ativan for possible seizure. CT noncontrast and CT angiogram of head and neck revealed right frontal hypodensity suggestive of right MCA infarction in the setting of bilateral severe carotid stenosis. She was then transferred to [facility], on exam NIHSS was 6, alert, awake however unable to move her left arm. She was also noticed to have continuous twitching of left arm and face, drop in oxygen saturation for which she was put on BiPAP.    SUBJECTIVE: No events overnight.  No new neurologic complaints.      acetaminophen   Tablet .. 650 milliGRAM(s) Oral every 6 hours PRN  ALBUTerol/ipratropium for Nebulization 3 milliLiter(s) Nebulizer every 6 hours  aspirin 325 milliGRAM(s) Oral daily  atorvastatin 80 milliGRAM(s) Oral at bedtime  buDESOnide   0.25 milliGRAM(s) Respule 0.25 milliGRAM(s) Inhalation every 12 hours  chlorhexidine 4% Liquid 1 Application(s) Topical <User Schedule>  clopidogrel Tablet 75 milliGRAM(s) Oral daily  hydrALAZINE Injectable 5 milliGRAM(s) IV Push every 3 hours PRN  levETIRAcetam  IVPB 1000 milliGRAM(s) IV Intermittent every 12 hours  nicotine - 21 mG/24Hr(s) Patch 1 patch Transdermal daily  potassium chloride   Powder 20 milliEquivalent(s) Oral once      PHYSICAL EXAM:   Vital Signs Last 24 Hrs  T(C): 36.8 (22 Jun 2019 07:00), Max: 37.1 (21 Jun 2019 08:34)  T(F): 98.3 (22 Jun 2019 07:00), Max: 98.7 (21 Jun 2019 08:34)  HR: 65 (22 Jun 2019 07:00) (39 - 104)  BP: 166/95 (21 Jun 2019 16:00) (161/115 - 187/126)  BP(mean): 113 (21 Jun 2019 16:00) (113 - 139)  RR: 13 (22 Jun 2019 07:00) (11 - 26)  SpO2: 100% (22 Jun 2019 07:00) (93% - 100%)    General: No acute distress  HEENT: Left gaze palsy, visual fields full  Abdomen: Soft, nontender, nondistended   Extremities: No edema    NEUROLOGICAL EXAM:  Mental status: Awake, alert, oriented x3, fluent speech,  follows commands  Cranial Nerves: left facial droop, left gaze palsy able to move eyes up to midline, VFF, no nystagmus, no dysarthria,  tongue midline  Motor exam: RUE 5/5, RLE 5/5 LUE 0/5, LLE drift 4/5  Sensation: Intact to light touch   Coordination/ Gait: No dysmetria, gait not tested      LABS:                        13.3   12.2  )-----------( 295      ( 21 Jun 2019 21:53 )             41.3    06-21    138  |  101  |  13  ----------------------------<  110<H>  4.0   |  23  |  0.93    Ca    8.4      21 Jun 2019 21:53  Phos  4.4     06-21  Mg     1.6     06-21    PT/INR - ( 20 Jun 2019 16:28 )   PT: 15.8 sec;   INR: 1.36 ratio         PTT - ( 21 Jun 2019 12:25 )  PTT:46.6 sec  Hemoglobin A1C, Whole Blood: 6.2 % (06-21 @ 11:12)      IMAGING: Reviewed by me.     VEEG (06/21/19) Normal EEG. There were no epileptiform abnormalities or seizures recorded.     MR Head No Cont (06.20.19) Acute right frontoparietal and temporal lobe infarcts. Possible subacute subcortical white matter infarct right frontal lobe. No acute intracranial hemorrhage. Recommend further imaging of the vascular system with MRAs of the brain and neck.    CT Head No Cont (06.20.19 11:45) No significant change since 6:56 AM. Subtle lucency in the right parietal region in a portion of the right middle cerebral artery distribution. No hemorrhage.    CT Head No Cont (06.20.19 07:25) No interval change in right middle cerebral artery infarct involving the right precentral gyrus and centrum semiovale. No hemorrhage. ASPECTS score 8 out of 10.    CT Head No Cont (06.20.19) Acute right middle cerebral artery infarct involving the right precentral gyrus and centrum semiovale. ASPECTS score 8 out of 10. No hemorrhage or midline shift.     CTA Head and Neck w/ IV Cont (06.20.19) Severe stenosis at the origin of the right internal carotid artery with filling defect in the anterior cavernous segment suspicious for critical stenosis/focal occlusion.  Moderate to severe stenosis of the proximal cervical segment and mild stenosis of the posterior cavernous segment of the left internal carotid artery.

## 2019-06-22 NOTE — PROVIDER CONTACT NOTE (OTHER) - REASON
10 sec  pt had a few twitching movemens on left arm from shoulder during blood draw. TARA Saldaña made aware no new orders
Pt rec'd from ED with LUE twitching and shaking extending into the neck and face
Pts HR 39
pt with elevated BP
Pts HR 36

## 2019-06-22 NOTE — PROGRESS NOTE ADULT - SUBJECTIVE AND OBJECTIVE BOX
Visit Summary: Patient seen and evaluated at bedside.    Overnight Events: none    Exam:  T(C): 36.9 (06-21-19 @ 23:00), Max: 37.1 (06-21-19 @ 08:34)  HR: 83 (06-22-19 @ 00:30) (50 - 104)  BP: 166/95 (06-21-19 @ 16:00) (155/87 - 187/126)  RR: 13 (06-22-19 @ 00:30) (12 - 26)  SpO2: 100% (06-22-19 @ 00:30) (91% - 100%)  Wt(kg): --    AOx2, FC, PERRL, EOMI, left facial  5/5 throughout but for LUE 0/5  SILT  no clonus                          13.3   12.2  )-----------( 295      ( 21 Jun 2019 21:53 )             41.3     06-21    138  |  101  |  13  ----------------------------<  110<H>  4.0   |  23  |  0.93    Ca    8.4      21 Jun 2019 21:53  Phos  4.4     06-21  Mg     1.6     06-21    TPro  7.1  /  Alb  3.9  /  TBili  1.9<H>  /  DBili  x   /  AST  18  /  ALT  26  /  AlkPhos  76  06-20  PT/INR - ( 20 Jun 2019 16:28 )   PT: 15.8 sec;   INR: 1.36 ratio         PTT - ( 21 Jun 2019 12:25 )  PTT:46.6 sec

## 2019-06-22 NOTE — PROVIDER CONTACT NOTE (OTHER) - ACTION/TREATMENT ORDERED:
HOMERO Jim examined patient at bedside. Keep atropine at bedside. No further interventions at this time. Continue to monitor
Rapid response called @ 0980, CXR, Labs sent
As per NP SBP OK within parameters . No new ordrs at this time
HOMERO Beltran assessed at bedside. No interventions at this time. Continue to monitor.
No new orders as per PA at this time . keppra due/admin  at this time .PA aware.

## 2019-06-22 NOTE — PROGRESS NOTE ADULT - ASSESSMENT
53F here with RMCA infarct on MRI found to have b/l carotid stenosis of ICA origin worse on right (severe) and SANDIE distal stenosis possible thrombus on CTA s/p SANDIE stennt placement  - f/u dopplers  - f/u p2y12  - con asa and plavix  - tx back to stroke in AM

## 2019-06-22 NOTE — PROGRESS NOTE ADULT - ASSESSMENT
ASSESSMENT/PLAN:  R centrum semiovale and pre central gyral lucency c/w stroke;  Focal Seizure; HTN, hypoxia    NEURO:  Neuro checks q 2 hr  xfer to stroke unit  Stroke Core Measures  Keppra 1 G BID  Activity:  [X] Bedrest     PULM: Hx suggestive of NIMESH  on NC    CV: Hx of HTN  Troponin nl  Cardiac Echo -- 30-35% EF  SBP goal- 100-140      RENAL:   Fluids: IVL    GI:  CCD  GI prophylaxis [X] not indicated   Bowel regimen [X] colace [X] senna [] other:    ENDO:    Goal euglycemia (-180)    HEME/ONC: Monitor PTT while on heaprin  VTE prophylaxis: [X] SCDs [X] chemoprophylaxis start SQL tonight 30q12    ID: Monitor for fever    SOCIAL/FAMILY:  [X] updated at bedside     CODE STATUS:  [X] Full Code     DISPOSITION:   [X] Stroke Unit [] Floor [] EMU [] RCU [] PCU    Patient was not critically ill, but was medically complex.  30 minutes spent.

## 2019-06-23 LAB
ANION GAP SERPL CALC-SCNC: 13 MMOL/L — SIGNIFICANT CHANGE UP (ref 5–17)
BUN SERPL-MCNC: 21 MG/DL — SIGNIFICANT CHANGE UP (ref 7–23)
CALCIUM SERPL-MCNC: 9.2 MG/DL — SIGNIFICANT CHANGE UP (ref 8.4–10.5)
CHLORIDE SERPL-SCNC: 102 MMOL/L — SIGNIFICANT CHANGE UP (ref 96–108)
CO2 SERPL-SCNC: 25 MMOL/L — SIGNIFICANT CHANGE UP (ref 22–31)
CREAT SERPL-MCNC: 0.94 MG/DL — SIGNIFICANT CHANGE UP (ref 0.5–1.3)
GLUCOSE SERPL-MCNC: 139 MG/DL — HIGH (ref 70–99)
HCT VFR BLD CALC: 43.6 % — SIGNIFICANT CHANGE UP (ref 34.5–45)
HGB BLD-MCNC: 13.6 G/DL — SIGNIFICANT CHANGE UP (ref 11.5–15.5)
MAGNESIUM SERPL-MCNC: 1.6 MG/DL — SIGNIFICANT CHANGE UP (ref 1.6–2.6)
MCHC RBC-ENTMCNC: 23.8 PG — LOW (ref 27–34)
MCHC RBC-ENTMCNC: 31.2 GM/DL — LOW (ref 32–36)
MCV RBC AUTO: 76.4 FL — LOW (ref 80–100)
PHOSPHATE SERPL-MCNC: 4.1 MG/DL — SIGNIFICANT CHANGE UP (ref 2.5–4.5)
PLATELET # BLD AUTO: 280 K/UL — SIGNIFICANT CHANGE UP (ref 150–400)
POTASSIUM SERPL-MCNC: 4.1 MMOL/L — SIGNIFICANT CHANGE UP (ref 3.5–5.3)
POTASSIUM SERPL-SCNC: 4.1 MMOL/L — SIGNIFICANT CHANGE UP (ref 3.5–5.3)
RBC # BLD: 5.71 M/UL — HIGH (ref 3.8–5.2)
RBC # FLD: 18 % — HIGH (ref 10.3–14.5)
SODIUM SERPL-SCNC: 140 MMOL/L — SIGNIFICANT CHANGE UP (ref 135–145)
WBC # BLD: 9.7 K/UL — SIGNIFICANT CHANGE UP (ref 3.8–10.5)
WBC # FLD AUTO: 9.7 K/UL — SIGNIFICANT CHANGE UP (ref 3.8–10.5)

## 2019-06-23 PROCEDURE — 99233 SBSQ HOSP IP/OBS HIGH 50: CPT

## 2019-06-23 RX ADMIN — Medication 1 MILLIGRAM(S): at 17:50

## 2019-06-23 RX ADMIN — Medication 1 PATCH: at 12:08

## 2019-06-23 RX ADMIN — CLOPIDOGREL BISULFATE 75 MILLIGRAM(S): 75 TABLET, FILM COATED ORAL at 12:08

## 2019-06-23 RX ADMIN — Medication 3 MILLILITER(S): at 12:04

## 2019-06-23 RX ADMIN — Medication 1 PATCH: at 19:00

## 2019-06-23 RX ADMIN — Medication 1 PATCH: at 07:00

## 2019-06-23 RX ADMIN — CHLORHEXIDINE GLUCONATE 1 APPLICATION(S): 213 SOLUTION TOPICAL at 22:21

## 2019-06-23 RX ADMIN — Medication 1 PATCH: at 12:09

## 2019-06-23 RX ADMIN — ATORVASTATIN CALCIUM 80 MILLIGRAM(S): 80 TABLET, FILM COATED ORAL at 22:12

## 2019-06-23 RX ADMIN — LEVETIRACETAM 1000 MILLIGRAM(S): 250 TABLET, FILM COATED ORAL at 17:24

## 2019-06-23 RX ADMIN — ENOXAPARIN SODIUM 30 MILLIGRAM(S): 100 INJECTION SUBCUTANEOUS at 05:06

## 2019-06-23 RX ADMIN — Medication 325 MILLIGRAM(S): at 12:08

## 2019-06-23 RX ADMIN — LEVETIRACETAM 1000 MILLIGRAM(S): 250 TABLET, FILM COATED ORAL at 05:06

## 2019-06-23 RX ADMIN — ENOXAPARIN SODIUM 30 MILLIGRAM(S): 100 INJECTION SUBCUTANEOUS at 17:24

## 2019-06-23 RX ADMIN — Medication 3 MILLILITER(S): at 05:27

## 2019-06-23 RX ADMIN — Medication 0.25 MILLIGRAM(S): at 05:27

## 2019-06-23 RX ADMIN — Medication 0.25 MILLIGRAM(S): at 17:40

## 2019-06-23 RX ADMIN — Medication 3 MILLILITER(S): at 17:40

## 2019-06-23 NOTE — PROGRESS NOTE ADULT - ASSESSMENT
53F here with RMCA infarct on MRI found to have b/l carotid stenosis of ICA origin worse on right (severe) and SANDIE distal stenosis possible thrombus on CTA s/p SANDIE stennt placement    - f/u dopplers pending report  - con asa and plavix  - tx back to stroke in AM

## 2019-06-23 NOTE — PROGRESS NOTE ADULT - SUBJECTIVE AND OBJECTIVE BOX
THE PATIENT WAS SEEN AND EXAMINED BY ME WITH THE HOUSESTAFF AND STROKE TEAM DURING MORNING ROUNDS.   HPI:  52y/o F RH  PMH Anxiety, 40 pk/yr smoking hx(still active), does not get regular checkups presented to [facility] with left arm numbness, spasms starting 20:00 on 6/19. A phone consult was performed with [facility] and during that time she had some worsening of her neurological status but left arm weakness and spasms. At that time she was given Ativan for possible seizure. CT noncontrast and CT angiogram of head and neck revealed right frontal hypodensity suggestive of right MCA infarction in the setting of bilateral severe carotid stenosis. She was then transferred to [facility], on exam NIHSS was 6, alert, awake however unable to move her left arm. She was also noticed to have continuous twitching of left arm and face, drop in oxygen saturation for which she was put on BiPAP.  THE PATIENT WAS SEEN AND EXAMINED BY ME WITH THE HOUSESTAFF AND STROKE TEAM DURING MORNING ROUNDS.   HPI:  52y/o F RH  PMH Anxiety, 40 pk/yr smoking hx(still active), does not get regular checkups presented to [facility] w/ L arm numbness, spasms starting 2000 6/19. LKW 6/19 2000. CT/CTA H/N done w/ severe stenosis at the origin of the right internal carotid artery with filling defect in the anterior cavernous segment suspicious for critical stenosis/focal occlusion as well as moderate to severe stenosis of the proximal cervical segment and mild stenosis of the posterior cavernous segment of the left internal carotid artery. Dense L arm paresis noted at 0345AM, transferred to [facility] for potential angio and further management of condition. On initial assessment in ED, NIHSS 6. (20 Jun 2019 07:01)      SUBJECTIVE: No events overnight.  No new neurologic complaints.      acetaminophen   Tablet .. 650 milliGRAM(s) Oral every 6 hours PRN  ALBUTerol/ipratropium for Nebulization 3 milliLiter(s) Nebulizer every 6 hours  aspirin 325 milliGRAM(s) Oral daily  atorvastatin 80 milliGRAM(s) Oral at bedtime  buDESOnide   0.25 milliGRAM(s) Respule 0.25 milliGRAM(s) Inhalation every 12 hours  chlorhexidine 4% Liquid 1 Application(s) Topical <User Schedule>  clopidogrel Tablet 75 milliGRAM(s) Oral daily  enoxaparin Injectable 30 milliGRAM(s) SubCutaneous <User Schedule>  hydrALAZINE Injectable 5 milliGRAM(s) IV Push every 3 hours PRN  levETIRAcetam 1000 milliGRAM(s) Oral two times a day  nicotine - 21 mG/24Hr(s) Patch 1 patch Transdermal daily      PHYSICAL EXAM:   Vital Signs Last 24 Hrs  T(C): 36.8 (23 Jun 2019 08:00), Max: 37.3 (23 Jun 2019 04:00)  T(F): 98.2 (23 Jun 2019 08:00), Max: 99.2 (23 Jun 2019 04:00)  HR: 61 (23 Jun 2019 12:23) (54 - 81)  BP: 134/75 (23 Jun 2019 12:00) (98/63 - 144/71)  BP(mean): 92 (23 Jun 2019 12:00) (72 - 100)  RR: 11 (23 Jun 2019 12:00) (11 - 17)  SpO2: 100% (23 Jun 2019 12:23) (94% - 100%)    General: No acute distress  HEENT: Left gaze palsy, visual fields full  Abdomen: Soft, nontender, nondistended   Extremities: No edema    NEUROLOGICAL EXAM:  Mental status: Awake, alert, oriented x3, fluent speech,  follows commands  Cranial Nerves: left facial droop, left gaze palsy able to move eyes up to midline, VFF, no nystagmus, no dysarthria,  tongue midline  Motor exam: RUE 5/5, RLE 5/5 LUE 0/5, LLE drift 4/5  Sensation: Intact to light touch   Coordination/ Gait: No dysmetria, gait not tested    LABS:                        13.3   12.2  )-----------( 295      ( 21 Jun 2019 21:53 )             41.3    06-21    138  |  101  |  13  ----------------------------<  110<H>  4.0   |  23  |  0.93    Ca    8.4      21 Jun 2019 21:53  Phos  4.4     06-21  Mg     1.6     06-21      Hemoglobin A1C, Whole Blood: 6.2 % (06-21 @ 11:12)      IMAGING: Reviewed by me.   VEEG (06/21/19) Normal EEG. There were no epileptiform abnormalities or seizures recorded.     MR Head No Cont (06.20.19) Acute right frontoparietal and temporal lobe infarcts. Possible subacute subcortical white matter infarct right frontal lobe. No acute intracranial hemorrhage. Recommend further imaging of the vascular system with MRAs of the brain and neck.    CT Head No Cont (06.20.19 11:45) No significant change since 6:56 AM. Subtle lucency in the right parietal region in a portion of the right middle cerebral artery distribution. No hemorrhage.    CT Head No Cont (06.20.19 07:25) No interval change in right middle cerebral artery infarct involving the right precentral gyrus and centrum semiovale. No hemorrhage. ASPECTS score 8 out of 10.    CT Head No Cont (06.20.19) Acute right middle cerebral artery infarct involving the right precentral gyrus and centrum semiovale. ASPECTS score 8 out of 10. No hemorrhage or midline shift.     CTA Head and Neck w/ IV Cont (06.20.19) Severe stenosis at the origin of the right internal carotid artery with filling defect in the anterior cavernous segment suspicious for critical stenosis/focal occlusion.  Moderate to severe stenosis of the proximal cervical segment and mild stenosis of the posterior cavernous segment of the left internal carotid artery.

## 2019-06-23 NOTE — PROGRESS NOTE ADULT - ASSESSMENT
52y/o F RH  PMH Anxiety, 40 pk/yr smoking hx(still active), does not get regular checkups presented to [facility] with left arm numbness, spasms starting 20:00 on 6/19. A phone consult was performed with [facility] and during that time she had some worsening of her neurological status but left arm weakness and spasms. At that time she was given Ativan for possible seizure. CT noncontrast and CT angiogram of head and neck revealed right frontal hypodensity suggestive of right MCA infarction in the setting of bilateral severe carotid stenosis. She was then transferred to [facility], on exam NIHSS was 6, alert, awake however unable to move her left arm. She was also noticed to have continuous twitching of left arm and face, drop in oxygen saturation for which she was put on BiPAP.    Impression:  #1 Right MCA artery ischemic infarct in the setting of large artery atherosclerosis - infarct pattern suggestive of watershed MCA -PCA and CORBIN-MCA and MCA embolic appearing infarct in the setting of Right carotid severe stenosis  #2 bilateral carotid extracranial stenosis    NEURO: Pt was hypertensive on 06/22/19, and [facility], Neurologically without acute change, Continue close monitoring for neurologic deterioration, -140 to gradual normotension, continue high dose statin, MRI Brain w/o contrast- completed and reviewed. Noted to have jerking movements of the left hand to LUE with subsequent fascial twitching on 6/20 AM and resolved with 2mg of IV Ativan, 1g of Keppra. VEEG with  no epileptiform abnormalities or seizures recorded. Continue Keppra 1g BID. s/p right SANDIE stent place on 6/21/19 with Dr. Barrios. Plan to obtain carotid doppler to eval surgical site. Physical therapy/OT with recommendations for AR, continue current rehab treatment.    ANTITHROMBOTIC THERAPY: ASA/plavix s/p SANDIE stent. P2Y12 06/22-84    PULMONARY: CXR(06/20/19) Limited evaluation of the left lung likely secondary to poor penetration and/or overlying soft tissue. Low lung volumes. No focal consolidation. BiPAP overnight, wean to high flow/NC as able, saturating well.     CARDIOVASCULAR: TTE : EF 30-35%, severely dilated left atrium, severe diastolic dysfunction, no PFO , Pt was hypertensive on 06/22/19, and was transferred to [facility] for further care, cardiac monitoring shows bradycardia HR 36-39 on 06/22/19. Dr Suma quinones (Cardiology) appreciated     SBP goal: -140    GASTROINTESTINAL:  dysphagia screen passed, tolerating diet     Diet: Regular    RENAL: BUN/Cr without acute change, hypokalemia- resolved, good urine output      Na Goal: Greater than 135     Nichole: No    HEMATOLOGY: H/H without acute change, Platelets 295     DVT ppx: lovenox    ID: afebrile, leukocytosis likely reactive, UA: Negative. BC x2 NGTD, will continue to monitor.      OTHER: Plan endorsed to patient and her family at bedside, all questions and concerns addressed. Current smoker- smoking cessation and education provided. Nicotine patch ordered. LUE splint ordered  Plan discussed with [facility], stable to transfer to stroke unit when bed available    DISPOSITION: AR as per PMR stacie once stable and workup is complete    CORE MEASURES:        Admission NIHSS: 6     TPA: NO      LDL/HDL: 71/27     Depression Screen: PENDING     Statin Therapy: yes     Dysphagia Screen: PASS     Smoking YES     Afib  NO     Stroke Education YES    Obtain screening lower extremity venous ultrasound in patients who meet 1 or more of the following criteria as patient is high risk for DVT/PE on admission:   [] History of DVT/PE  []Hypercoagulable states (Factor V Leiden, Cancer, OCP, etc. )  []Prolonged immobility (hemiplegia/hemiparesis/post operative or any other extended immobilization)  [] Transferred from outside facility (Rehab or Long term care)  [] Age </= to 50

## 2019-06-24 ENCOUNTER — TRANSCRIPTION ENCOUNTER (OUTPATIENT)
Age: 54
End: 2019-06-24

## 2019-06-24 DIAGNOSIS — F17.200 NICOTINE DEPENDENCE, UNSPECIFIED, UNCOMPLICATED: ICD-10-CM

## 2019-06-24 DIAGNOSIS — I63.9 CEREBRAL INFARCTION, UNSPECIFIED: ICD-10-CM

## 2019-06-24 DIAGNOSIS — I50.40 UNSPECIFIED COMBINED SYSTOLIC (CONGESTIVE) AND DIASTOLIC (CONGESTIVE) HEART FAILURE: ICD-10-CM

## 2019-06-24 PROCEDURE — 99232 SBSQ HOSP IP/OBS MODERATE 35: CPT

## 2019-06-24 PROCEDURE — 99233 SBSQ HOSP IP/OBS HIGH 50: CPT

## 2019-06-24 RX ORDER — LISINOPRIL 2.5 MG/1
5 TABLET ORAL DAILY
Refills: 0 | Status: DISCONTINUED | OUTPATIENT
Start: 2019-06-24 | End: 2019-06-26

## 2019-06-24 RX ADMIN — ENOXAPARIN SODIUM 30 MILLIGRAM(S): 100 INJECTION SUBCUTANEOUS at 05:04

## 2019-06-24 RX ADMIN — ATORVASTATIN CALCIUM 80 MILLIGRAM(S): 80 TABLET, FILM COATED ORAL at 21:33

## 2019-06-24 RX ADMIN — Medication 3 MILLILITER(S): at 17:18

## 2019-06-24 RX ADMIN — LISINOPRIL 5 MILLIGRAM(S): 2.5 TABLET ORAL at 17:34

## 2019-06-24 RX ADMIN — Medication 3 MILLILITER(S): at 23:45

## 2019-06-24 RX ADMIN — Medication 3 MILLILITER(S): at 11:12

## 2019-06-24 RX ADMIN — ENOXAPARIN SODIUM 30 MILLIGRAM(S): 100 INJECTION SUBCUTANEOUS at 17:35

## 2019-06-24 RX ADMIN — Medication 1 PATCH: at 06:52

## 2019-06-24 RX ADMIN — CLOPIDOGREL BISULFATE 75 MILLIGRAM(S): 75 TABLET, FILM COATED ORAL at 12:11

## 2019-06-24 RX ADMIN — LEVETIRACETAM 1000 MILLIGRAM(S): 250 TABLET, FILM COATED ORAL at 17:34

## 2019-06-24 RX ADMIN — Medication 1 PATCH: at 12:12

## 2019-06-24 RX ADMIN — Medication 0.25 MILLIGRAM(S): at 17:18

## 2019-06-24 RX ADMIN — CHLORHEXIDINE GLUCONATE 1 APPLICATION(S): 213 SOLUTION TOPICAL at 21:33

## 2019-06-24 RX ADMIN — Medication 3 MILLILITER(S): at 05:25

## 2019-06-24 RX ADMIN — Medication 5 MILLIGRAM(S): at 16:30

## 2019-06-24 RX ADMIN — Medication 5 MILLIGRAM(S): at 20:19

## 2019-06-24 RX ADMIN — LEVETIRACETAM 1000 MILLIGRAM(S): 250 TABLET, FILM COATED ORAL at 05:04

## 2019-06-24 RX ADMIN — Medication 1 MILLIGRAM(S): at 07:11

## 2019-06-24 RX ADMIN — Medication 1 PATCH: at 19:36

## 2019-06-24 RX ADMIN — Medication 0.25 MILLIGRAM(S): at 05:25

## 2019-06-24 RX ADMIN — Medication 3 MILLILITER(S): at 00:07

## 2019-06-24 RX ADMIN — Medication 325 MILLIGRAM(S): at 12:09

## 2019-06-24 RX ADMIN — Medication 1 PATCH: at 12:08

## 2019-06-24 NOTE — CONSULT NOTE ADULT - PROBLEM SELECTOR RECOMMENDATION 4
as per neurology Bi-ventricular dysfunction with EF 35%, severe pHTN, severe diastolic dysfunction   -Consider cards consult for further w/u

## 2019-06-24 NOTE — PROGRESS NOTE ADULT - SUBJECTIVE AND OBJECTIVE BOX
THE PATIENT WAS SEEN AND EXAMINED BY ME WITH THE HOUSESTAFF AND STROKE TEAM DURING MORNING ROUNDS.   HPI:  54y/o F RH  PMH Anxiety, 40 pk/yr smoking hx(still active), does not get regular checkups presented to [facility] with left arm numbness, spasms starting 20:00 on 6/19. A phone consult was performed with [facility] and during that time she had some worsening of her neurological status but left arm weakness and spasms. At that time she was given Ativan for possible seizure. CT noncontrast and CT angiogram of head and neck revealed right frontal hypodensity suggestive of right MCA infarction in the setting of bilateral severe carotid stenosis. She was then transferred to [facility], on exam NIHSS was 6, alert, awake however unable to move her left arm. She was also noticed to have continuous twitching of left arm and face, drop in oxygen saturation for which she was put on BiPAP.    SUBJECTIVE: No events overnight.  No new neurologic complaints.      acetaminophen   Tablet .. 650 milliGRAM(s) Oral every 6 hours PRN  ALBUTerol/ipratropium for Nebulization 3 milliLiter(s) Nebulizer every 6 hours  aspirin 325 milliGRAM(s) Oral daily  atorvastatin 80 milliGRAM(s) Oral at bedtime  buDESOnide   0.25 milliGRAM(s) Respule 0.25 milliGRAM(s) Inhalation every 12 hours  chlorhexidine 4% Liquid 1 Application(s) Topical <User Schedule>  clopidogrel Tablet 75 milliGRAM(s) Oral daily  enoxaparin Injectable 30 milliGRAM(s) SubCutaneous <User Schedule>  hydrALAZINE Injectable 5 milliGRAM(s) IV Push every 3 hours PRN  levETIRAcetam 1000 milliGRAM(s) Oral two times a day  nicotine - 21 mG/24Hr(s) Patch 1 patch Transdermal daily    PHYSICAL EXAM:   Vital Signs Last 24 Hrs  T(C): 36.6 (24 Jun 2019 04:00), Max: 36.7 (23 Jun 2019 16:00)  T(F): 97.9 (24 Jun 2019 04:00), Max: 98.1 (23 Jun 2019 20:00)  HR: 76 (24 Jun 2019 06:00) (56 - 94)  BP: 163/95 (24 Jun 2019 06:00) (126/75 - 166/87)  BP(mean): 116 (24 Jun 2019 06:00) (85 - 116)  RR: 13 (24 Jun 2019 06:00) (11 - 17)  SpO2: 100% (24 Jun 2019 06:00) (94% - 100%)    General: No acute distress  HEENT: Left gaze palsy, visual fields full  Abdomen: Soft, nontender, nondistended   Extremities: No edema    NEUROLOGICAL EXAM:  Mental status: Awake, alert, oriented x3, fluent speech, follows commands  Cranial Nerves: left facial droop, left gaze palsy able to move eyes up to midline, VFF, no nystagmus, no dysarthria,  tongue midline  Motor exam: RUE 5/5, RLE 5/5 LUE 0/5, LLE drift 4/5  Sensation: Intact to light touch   Coordination/ Gait: No dysmetria, gait not tested    LABS:                        13.6   9.7   )-----------( 280      ( 23 Jun 2019 22:49 )             43.6    06-23    140  |  102  |  21  ----------------------------<  139<H>  4.1   |  25  |  0.94    Ca    9.2      23 Jun 2019 22:49  Phos  4.1     06-23  Mg     1.6     06-23    Hemoglobin A1C, Whole Blood: 6.2 % (06-21 @ 11:12)    IMAGING: Reviewed by me.     VEEG (06/21/19) Normal EEG. There were no epileptiform abnormalities or seizures recorded.     MR Head No Cont (06.20.19) Acute right frontoparietal and temporal lobe infarcts. Possible subacute subcortical white matter infarct right frontal lobe. No acute intracranial hemorrhage. Recommend further imaging of the vascular system with MRAs of the brain and neck.    CT Head No Cont (06.20.19 11:45) No significant change since 6:56 AM. Subtle lucency in the right parietal region in a portion of the right middle cerebral artery distribution. No hemorrhage.    CT Head No Cont (06.20.19 07:25) No interval change in right middle cerebral artery infarct involving the right precentral gyrus and centrum semiovale. No hemorrhage. ASPECTS score 8 out of 10.    CT Head No Cont (06.20.19) Acute right middle cerebral artery infarct involving the right precentral gyrus and centrum semiovale. ASPECTS score 8 out of 10. No hemorrhage or midline shift.     CTA Head and Neck w/ IV Cont (06.20.19) Severe stenosis at the origin of the right internal carotid artery with filling defect in the anterior cavernous segment suspicious for critical stenosis/focal occlusion.  Moderate to severe stenosis of the proximal cervical segment and mild stenosis of the posterior cavernous segment of the left internal carotid artery.

## 2019-06-24 NOTE — DISCHARGE NOTE PROVIDER - HOSPITAL COURSE
54y/o F RH  PMH Anxiety, 40 pk/yr smoking hx(still active), does not get regular checkups presented to [facility] w/ L arm numbness, spasms starting 2000 6/19. KRISHW 6/19 2000. A phone consult was performed with [facility] mid she had some worsening of her neurological status but left arm weakness and spasms. At that time she was given Ativan for possible seizure.    A CT noncontrast and CT angiogram of head and neck revealed right frontal hypodensity suggestive of right MCA infarction in the setting of bilateral severe carotid stenosis. She was then transferred to [facility], on exam a stroke scale was 6, alert, awake however unable to move her left arm. She was also noticed to have continuous twitching of left arm and face, drop in oxygen saturation for which she was put on BiPAP in the MICU/neurological ICU consultation was obtained. Loading dose of Keppra 1000 mg IV was administered and then patient was put on Keppra 1000mg PO BID. Head CT after change of her neurological status (seizure) has no new infarct or hemorrhage, therefore hoping to resume IV heparin drip Coumadin PTT 50-70    EEG performed on 6/20/19; Clinical Impression: Mild diffuse cerebral dysfunction, diffuse fast activity typically a medication effect There were no epileptiform abnormalities recorded. 54y/o F RH  PMH Anxiety, 40 pk/yr smoking hx(still active), does not get regular checkups presented to [facility] with left arm numbness, spasms starting 20:00 on 6/19. A phone consult was performed with [facility] and during that time she had some worsening of her neurological status but left arm weakness and spasms. At that time she was given Ativan for possible seizure. CT noncontrast and CT angiogram of head and neck revealed right frontal hypodensity suggestive of right MCA infarction in the setting of bilateral severe carotid stenosis. She was then transferred to [facility], on exam NIHSS was 6, alert, awake however unable to move her left arm. She was also noticed to have continuous twitching of left arm and face, drop in oxygen saturation for which she was put on BiPAP.        MR Head No Cont (06.20.19) Acute right frontoparietal and temporal lobe infarcts. Possible subacute subcortical white matter infarct right frontal lobe. No acute intracranial hemorrhage. Recommend further imaging of the vascular system with MRAs of the brain and neck.        CT Head No Cont (06.20.19 11:45) No significant change since 6:56 AM. Subtle lucency in the right parietal region in a portion of the right middle cerebral artery distribution. No hemorrhage.        CT Head No Cont (06.20.19 07:25) No interval change in right middle cerebral artery infarct involving the right precentral gyrus and centrum semiovale. No hemorrhage. ASPECTS score 8 out of 10.        CT Head No Cont (06.20.19) Acute right middle cerebral artery infarct involving the right precentral gyrus and centrum semiovale. ASPECTS score 8 out of 10. No hemorrhage or midline shift.         CTA Head and Neck w/ IV Cont (06.20.19) Severe stenosis at the origin of the right internal carotid artery with filling defect in the anterior cavernous segment suspicious for critical stenosis/focal occlusion.    Moderate to severe stenosis of the proximal cervical segment and mild stenosis of the posterior cavernous segment of the left internal carotid artery.        Impression:    #1 Right MCA artery ischemic infarct in the setting of large artery atherosclerosis - infarct pattern suggestive of watershed MCA -PCA and CORBIN-MCA and MCA embolic appearing infarct in the setting of Right carotid severe stenosis    #2 bilateral carotid extracranial stenosis        She underwent SANDIE stent place on 6/21/19 with Dr. Barrios. Carotid dopplers showed unimpeded antegrade flow through the stented right internal carotid artery.There is a severe, greater than 70% stenosis of the left internal carotid artery. There are flow-limiting stenoses of both the right and left external carotid arteries. MRI Brain w/o contrast showed acute right frontoparietal and temporal lobe infarcts.        Pt was hypertensive on 06/22/19, and was transferred to [facility] for further care, now stabilized. SBP goal is 100-140 to gradual normotension.         Started on ASA/Plavix for stent placement and high dose statin for secondary stroke prevention.         On 6/20 AM she was noted to have jerking movements of the left hand to LUE with subsequent fascial twitching and resolved with 2mg of IV Ativan, 1g of Keppra. VEEG with no epileptiform abnormalities or seizures recorded. Continue Keppra 1g BID.         During hospital course she had acute respiratory failure due to acute pulmonary edema treated with BIPAP and IV lasix which then resolved. Advised by pulmonary to start on CPAP for NIMESH. Advised for outpatient sleep study.     TTE : EF 30-35%, severely dilated left atrium, severe diastolic dysfunction.         Current smoker- smoking cessation and education provided. Nicotine patch ordered.        Physical therapy/OT with recommendations for AR, continue current rehab treatment. LUE splint ordered. Patient stable for discharge with close outpatient followup for left ICA stenosis. 52y/o F RH  PMH Anxiety, 40 pk/yr smoking hx(still active), does not get regular checkups presented to [facility] with left arm numbness, spasms starting 20:00 on 6/19. A phone consult was performed with [facility] and during that time she had some worsening of her neurological status but left arm weakness and spasms. At that time she was given Ativan for possible seizure. CT noncontrast and CT angiogram of head and neck revealed right frontal hypodensity suggestive of right MCA infarction in the setting of bilateral severe carotid stenosis. She was then transferred to [facility], on exam NIHSS was 6, alert, awake however unable to move her left arm. She was also noticed to have continuous twitching of left arm and face, drop in oxygen saturation for which she was put on BiPAP.        MR Head No Cont (06.20.19) Acute right frontoparietal and temporal lobe infarcts. Possible subacute subcortical white matter infarct right frontal lobe. No acute intracranial hemorrhage. Recommend further imaging of the vascular system with MRAs of the brain and neck.        CT Head No Cont (06.20.19 11:45) No significant change since 6:56 AM. Subtle lucency in the right parietal region in a portion of the right middle cerebral artery distribution. No hemorrhage.        CT Head No Cont (06.20.19 07:25) No interval change in right middle cerebral artery infarct involving the right precentral gyrus and centrum semiovale. No hemorrhage. ASPECTS score 8 out of 10.        CT Head No Cont (06.20.19) Acute right middle cerebral artery infarct involving the right precentral gyrus and centrum semiovale. ASPECTS score 8 out of 10. No hemorrhage or midline shift.         CTA Head and Neck w/ IV Cont (06.20.19) Severe stenosis at the origin of the right internal carotid artery with filling defect in the anterior cavernous segment suspicious for critical stenosis/focal occlusion.    Moderate to severe stenosis of the proximal cervical segment and mild stenosis of the posterior cavernous segment of the left internal carotid artery.        Impression:    #1 Right MCA artery ischemic infarct in the setting of large artery atherosclerosis - infarct pattern suggestive of watershed MCA -PCA and CORBIN-MCA and MCA embolic appearing infarct in the setting of Right carotid severe stenosis    #2 bilateral carotid extracranial stenosis        She underwent SANDIE stent place on 6/21/19 with Dr. Barrios. Carotid dopplers showed unimpeded antegrade flow through the stented right internal carotid artery.There is a severe, greater than 70% stenosis of the left internal carotid artery. There are flow-limiting stenoses of both the right and left external carotid arteries. MRI Brain w/o contrast showed acute right frontoparietal and temporal lobe infarcts.        Pt was hypertensive on 06/22/19, and was transferred to [facility] for further care, now stabilized. SBP goal is 100-140 to gradual normotension.         Started on ASA/Plavix for stent placement and high dose statin for secondary stroke prevention.         On 6/20 AM she was noted to have jerking movements of the left hand to LUE with subsequent fascial twitching and resolved with 2mg of IV Ativan, 1g of Keppra. VEEG with no epileptiform abnormalities or seizures recorded. Continue Keppra 1g BID.         During hospital course she had acute respiratory failure due to acute pulmonary edema treated with BIPAP and IV lasix which then resolved. Advised by pulmonary to start on CPAP for NIMESH. Advised for outpatient sleep study.     TTE : EF 30-35%, severely dilated left atrium, severe diastolic dysfunction.         Current smoker- smoking cessation and education provided. Nicotine patch ordered.        Physical therapy/OT with recommendations for AR, continue current rehab treatment. LUE splint ordered. Patient stable for discharge with close outpatient followup for left ICA stenosis possible elective stenting). 52y/o F RH  PMH Anxiety, 40 pk/yr smoking hx(still active), does not get regular checkups presented to [facility] with left arm numbness, spasms starting 20:00 on 6/19. A phone consult was performed with [facility] and during that time she had some worsening of her neurological status but left arm weakness and spasms. At that time she was given Ativan for possible seizure. CT noncontrast and CT angiogram of head and neck revealed right frontal hypodensity suggestive of right MCA infarction in the setting of bilateral severe carotid stenosis. She was then transferred to [facility], on exam NIHSS was 6, alert, awake however unable to move her left arm. She was also noticed to have continuous twitching of left arm and face, drop in oxygen saturation for which she was put on BiPAP.        MR Head No Cont (06.20.19) Acute right frontoparietal and temporal lobe infarcts. Possible subacute subcortical white matter infarct right frontal lobe. No acute intracranial hemorrhage. Recommend further imaging of the vascular system with MRAs of the brain and neck.        CT Head No Cont (06.20.19 11:45) No significant change since 6:56 AM. Subtle lucency in the right parietal region in a portion of the right middle cerebral artery distribution. No hemorrhage.        CT Head No Cont (06.20.19 07:25) No interval change in right middle cerebral artery infarct involving the right precentral gyrus and centrum semiovale. No hemorrhage. ASPECTS score 8 out of 10.        CT Head No Cont (06.20.19) Acute right middle cerebral artery infarct involving the right precentral gyrus and centrum semiovale. ASPECTS score 8 out of 10. No hemorrhage or midline shift.         CTA Head and Neck w/ IV Cont (06.20.19) Severe stenosis at the origin of the right internal carotid artery with filling defect in the anterior cavernous segment suspicious for critical stenosis/focal occlusion.    Moderate to severe stenosis of the proximal cervical segment and mild stenosis of the posterior cavernous segment of the left internal carotid artery.        Impression:    #1 Right MCA artery ischemic infarct in the setting of large artery atherosclerosis - infarct pattern suggestive of watershed MCA -PCA and CORBIN-MCA and MCA embolic appearing infarct in the setting of Right carotid severe stenosis    #2 bilateral carotid extracranial stenosis        She underwent SANDIE stent place on 6/21/19 with Dr. Barrios. Carotid dopplers showed unimpeded antegrade flow through the stented right internal carotid artery.There is a severe, greater than 70% stenosis of the left internal carotid artery. There are flow-limiting stenoses of both the right and left external carotid arteries. MRI Brain w/o contrast showed acute right frontoparietal and temporal lobe infarcts.        Pt was hypertensive on 06/22/19, and was transferred to [facility] for further care, now stabilized. SBP goal is 100-140 to gradual normotension.         Started on ASA/Plavix for stent placement and high dose statin for secondary stroke prevention.         On 6/20 AM she was noted to have jerking movements of the left hand to LUE with subsequent fascial twitching and resolved with 2mg of IV Ativan, 1g of Keppra. VEEG with no epileptiform abnormalities or seizures recorded. Continue Keppra 1g BID.         During hospital course she had acute respiratory failure due to acute pulmonary edema treated with BIPAP and IV lasix which then resolved. Advised by pulmonary to start on CPAP for NIMESH. Advised for outpatient sleep study.     TTE : EF 30-35%, severely dilated left atrium, severe diastolic dysfunction.     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 She was seen by CT surgery who advised followup after discharge from rehab.         Current smoker- smoking cessation and education provided. Nicotine patch ordered.            Physical therapy/OT with recommendations for AR, continue current rehab treatment. LUE splint ordered. Patient stable for discharge with close outpatient followup for left ICA stenosis possible elective stenting). 54y/o F RH  PMH Anxiety, 40 pk/yr smoking hx(still active), does not get regular checkups presented to [facility] with left arm numbness, spasms starting 20:00 on 6/19. A phone consult was performed with [facility] and during that time she had some worsening of her neurological status but left arm weakness and spasms. At that time she was given Ativan for possible seizure. CT noncontrast and CT angiogram of head and neck revealed right frontal hypodensity suggestive of right MCA infarction in the setting of bilateral severe carotid stenosis. She was then transferred to [facility], on exam NIHSS was 6, alert, awake however unable to move her left arm. She was also noticed to have continuous twitching of left arm and face, drop in oxygen saturation for which she was put on BiPAP.        MR Head No Cont (06.20.19) Acute right frontoparietal and temporal lobe infarcts. Possible subacute subcortical white matter infarct right frontal lobe. No acute intracranial hemorrhage. Recommend further imaging of the vascular system with MRAs of the brain and neck.        CT Head No Cont (06.20.19 11:45) No significant change since 6:56 AM. Subtle lucency in the right parietal region in a portion of the right middle cerebral artery distribution. No hemorrhage.        CT Head No Cont (06.20.19 07:25) No interval change in right middle cerebral artery infarct involving the right precentral gyrus and centrum semiovale. No hemorrhage. ASPECTS score 8 out of 10.        CT Head No Cont (06.20.19) Acute right middle cerebral artery infarct involving the right precentral gyrus and centrum semiovale. ASPECTS score 8 out of 10. No hemorrhage or midline shift.         CTA Head and Neck w/ IV Cont (06.20.19) Severe stenosis at the origin of the right internal carotid artery with filling defect in the anterior cavernous segment suspicious for critical stenosis/focal occlusion.    Moderate to severe stenosis of the proximal cervical segment and mild stenosis of the posterior cavernous segment of the left internal carotid artery.        Impression:    #1 Right MCA artery ischemic infarct in the setting of large artery atherosclerosis - infarct pattern suggestive of watershed MCA -PCA and CORBIN-MCA and MCA embolic appearing infarct in the setting of Right carotid severe stenosis    #2 bilateral carotid extracranial stenosis        She underwent SANDIE stent place on 6/21/19 with Dr. Barrios. Carotid dopplers showed unimpeded antegrade flow through the stented right internal carotid artery.There is a severe, greater than 70% stenosis of the left internal carotid artery. There are flow-limiting stenoses of both the right and left external carotid arteries. MRI Brain w/o contrast showed acute right frontoparietal and temporal lobe infarcts.        Pt was hypertensive on 06/22/19, and was transferred to [facility] for further care, now stabilized. SBP goal is 100-140 to gradual normotension.         Started on ASA/Plavix for stent placement and high dose statin for secondary stroke prevention.         On 6/20 AM she was noted to have jerking movements of the left hand to LUE with subsequent fascial twitching and resolved with 2mg of IV Ativan, 1g of Keppra. VEEG with no epileptiform abnormalities or seizures recorded. Continued on Keppra 1g BID.         During hospital course she had acute respiratory failure due to acute pulmonary edema treated with BIPAP and IV lasix which then resolved. Advised by pulmonary to start on CPAP for NIMESH. Advised for outpatient sleep study.     TTE : EF 30-35%, severely dilated left atrium, severe diastolic dysfunction.     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 She was seen by CT surgery who advised followup after discharge from rehab.         Current smoker- smoking cessation and education provided. Nicotine patch ordered.        She was started on PRN Ativan for anxiety.         Physical therapy/OT with recommendations for AR, continue current rehab treatment. LUE splint ordered. Patient stable for discharge with close outpatient followup for left ICA stenosis possible elective stenting.

## 2019-06-24 NOTE — DISCHARGE NOTE PROVIDER - NSDCCPCAREPLAN_GEN_ALL_CORE_FT
PRINCIPAL DISCHARGE DIAGNOSIS  Diagnosis: Stroke  Assessment and Plan of Treatment: Please follow up with neurologist in 1 week. The office will call you to schedule an appointment, if you do not hear from them please call [phone].   Please try to schedule a sleep study with Dr. Giles once discharged from rehab.   Continue taking medications as prescribed. Monitor your blood pressure. Reduce fat, cholesterol and salt in your diet. Increase intake of fruits and vegetables. Limit alcohol to minimum and do not smoke. You may be at risk for falling, make changes to your home to help you walk easier. Keep up to date on vaccinations.  If you experience any symptoms of facial drooping, slurred speech, arm or leg weakness, severe headache, vision changes or any worsening symptoms, notify provider immediatley and return to ER.      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 Continue medications as prescribed. Continue low sodium diet. monitor fluid intake, monitor weight every morning. Monitor blood pressure. Do not smoke. Please inform your doctors with any new or worsening symptoms such as shortness of breath or leg swelling.    Diagnosis: Current smoker  Assessment and Plan of Treatment: Please continue on nicotine patch and refrain from smoking. Follow up with your primary care provider after discharge to ensure you are continuing on smoking cessation. PRINCIPAL DISCHARGE DIAGNOSIS  Diagnosis: Stroke  Assessment and Plan of Treatment: Please follow up with neurologist in 1 week. The office will call you to schedule an appointment, if you do not hear from them please call [phone].   Please try to schedule a sleep study with Dr. Giles once discharged from rehab.   Continue taking medications as prescribed. Monitor your blood pressure. Reduce fat, cholesterol and salt in your diet. Increase intake of fruits and vegetables. Limit alcohol to minimum and do not smoke. You may be at risk for falling, make changes to your home to help you walk easier. Keep up to date on vaccinations.  If you experience any symptoms of facial drooping, slurred speech, arm or leg weakness, severe headache, vision changes or any worsening symptoms, notify provider immediatley and return to ER.      SECONDARY DISCHARGE DIAGNOSES  Diagnosis: Triple vessel disease of the heart  Assessment and Plan of Treatment: Please call to make an appointment with cardiothoracic surgery, Dr. Thurman after discharge from rehab. It is important that you make an appointment as you will need surgery for your coronary arteries after rehab. Continue taking medications as prescribed and monitor your blood pressure.    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 Continue medications as prescribed. Continue low sodium diet. monitor fluid intake, monitor weight every morning. Monitor blood pressure. Do not smoke. Please inform your doctors with any new or worsening symptoms such as shortness of breath or leg swelling.    Diagnosis: Current smoker  Assessment and Plan of Treatment: Please continue on nicotine patch and refrain from smoking. Follow up with your primary care provider after discharge to ensure you are continuing on smoking cessation.

## 2019-06-24 NOTE — CONSULT NOTE ADULT - SUBJECTIVE AND OBJECTIVE BOX
PULMONARY CONSULT    HPI: 54 y/o F with PMH of anxiety, current smoker (35 pack yrs) presents with L arm numbness found to have R MCA CVA s/p R ICA stent 6/21 and severe R carotid artery stenosis. Called to eval for hypoxia at night and concern for underlying COPD. Patient states she is not currently dyspneic, does endorse snoring at night,  has witnessed periods of apnea. Currently with no complaints, 98% on 1L NC.       PAST MEDICAL & SURGICAL HISTORY:  Anxiety  No significant past surgical history    Allergies    No Known Allergies    Intolerances      FAMILY HISTORY: Non-contributory     Social history: Current smoker, 35 pack yrs    Review of Systems:  CONSTITUTIONAL: No fever, chills, or fatigue  EYES: No eye pain, visual disturbances, or discharge  ENMT:  No difficulty hearing, tinnitus, vertigo; No sinus or throat pain  NECK: No pain or stiffness  RESPIRATORY: Per above  CARDIOVASCULAR: No chest pain, palpitations, dizziness, or leg swelling  GASTROINTESTINAL: No abdominal or epigastric pain. No nausea, vomiting, or hematemesis; No diarrhea or constipation. No melena or hematochezia.  GENITOURINARY: No dysuria, frequency, hematuria, or incontinence  NEUROLOGICAL: No headaches, memory loss, loss of strength, numbness, or tremors  SKIN: No itching, burning, rashes, or lesions   MUSCULOSKELETAL: No joint pain or swelling; No muscle, back, or extremity pain  PSYCHIATRIC: No depression, anxiety, mood swings, or difficulty sleeping      Medications:  MEDICATIONS  (STANDING):  ALBUTerol/ipratropium for Nebulization 3 milliLiter(s) Nebulizer every 6 hours  aspirin 325 milliGRAM(s) Oral daily  atorvastatin 80 milliGRAM(s) Oral at bedtime  buDESOnide   0.25 milliGRAM(s) Respule 0.25 milliGRAM(s) Inhalation every 12 hours  chlorhexidine 4% Liquid 1 Application(s) Topical <User Schedule>  clopidogrel Tablet 75 milliGRAM(s) Oral daily  enoxaparin Injectable 30 milliGRAM(s) SubCutaneous <User Schedule>  levETIRAcetam 1000 milliGRAM(s) Oral two times a day  nicotine - 21 mG/24Hr(s) Patch 1 patch Transdermal daily    MEDICATIONS  (PRN):  acetaminophen   Tablet .. 650 milliGRAM(s) Oral every 6 hours PRN Temp greater or equal to 38C (100.4F), Mild Pain (1 - 3), Moderate Pain (4 - 6), Severe Pain (7 - 10)  hydrALAZINE Injectable 5 milliGRAM(s) IV Push every 3 hours PRN SBP>140            Vital Signs Last 24 Hrs  T(C): 36.7 (24 Jun 2019 12:00), Max: 36.7 (23 Jun 2019 16:00)  T(F): 98 (24 Jun 2019 12:00), Max: 98.1 (23 Jun 2019 20:00)  HR: 81 (24 Jun 2019 14:00) (56 - 94)  BP: 166/92 (24 Jun 2019 14:00) (128/68 - 166/92)  BP(mean): 112 (24 Jun 2019 14:00) (85 - 116)  RR: 11 (24 Jun 2019 14:00) (11 - 20)  SpO2: 97% (24 Jun 2019 14:00) (94% - 100%) on [facility]            06-23 @ 07:01  -  06-24 @ 07:00  --------------------------------------------------------  IN: 660 mL / OUT: 1000 mL / NET: -340 mL          LABS:                        13.6   9.7   )-----------( 280      ( 23 Jun 2019 22:49 )             43.6     06-23    140  |  102  |  21  ----------------------------<  139<H>  4.1   |  25  |  0.94    Ca    9.2      23 Jun 2019 22:49  Phos  4.1     06-23  Mg     1.6     06-23            CAPILLARY BLOOD GLUCOSE                          CULTURES: (if applicable)  Culture Results:   No growth to date. (06-20 @ 22:03)  Culture Results:   No growth to date. (06-20 @ 22:03)        Physical Examination:    General: No acute distress.      HEENT: Pupils equal, reactive to light.  Symmetric.    PULM: Clear to auscultation bilaterally, no significant sputum production    CVS: S1, S2    ABD: Soft, nondistended, nontender, normoactive bowel sounds, no masses    EXT: No edema, nontender    SKIN: Warm and well perfused, no rashes noted.    NEURO: Alert, oriented, interactive, LUE paralysis     RADIOLOGY REVIEWED  CXR: 6/20: Central congestion, grossly clear PULMONARY CONSULT    HPI: 52 y/o F with PMH of anxiety, current smoker (35 pack yrs) presents with L arm numbness found to have R MCA CVA s/p R ICA stent 6/21 and severe R carotid artery stenosis. Called to eval for hypoxia at night and concern for underlying COPD. Patient states she is not currently dyspneic, does endorse snoring at night,  has witnessed periods of apnea. Currently with no complaints, 98% on 1L NC.       PAST MEDICAL & SURGICAL HISTORY:  Anxiety  No significant past surgical history    Allergies    No Known Allergies    Intolerances      FAMILY HISTORY: Non-contributory     Social history: Current smoker, 35 pack yrs    Review of Systems:  CONSTITUTIONAL: No fever, chills, or fatigue  EYES: No eye pain, visual disturbances, or discharge  ENMT:  No difficulty hearing, tinnitus, vertigo; No sinus or throat pain  NECK: No pain or stiffness  RESPIRATORY: Per above  CARDIOVASCULAR: No chest pain, palpitations, dizziness, or leg swelling  GASTROINTESTINAL: No abdominal or epigastric pain. No nausea, vomiting, or hematemesis; No diarrhea or constipation. No melena or hematochezia.  GENITOURINARY: No dysuria, frequency, hematuria, or incontinence  NEUROLOGICAL: No headaches, memory loss, loss of strength, numbness, or tremors  SKIN: No itching, burning, rashes, or lesions   MUSCULOSKELETAL: No joint pain or swelling; No muscle, back, or extremity pain  PSYCHIATRIC: No depression, anxiety, mood swings, or difficulty sleeping      Medications:  MEDICATIONS  (STANDING):  ALBUTerol/ipratropium for Nebulization 3 milliLiter(s) Nebulizer every 6 hours  aspirin 325 milliGRAM(s) Oral daily  atorvastatin 80 milliGRAM(s) Oral at bedtime  buDESOnide   0.25 milliGRAM(s) Respule 0.25 milliGRAM(s) Inhalation every 12 hours  chlorhexidine 4% Liquid 1 Application(s) Topical <User Schedule>  clopidogrel Tablet 75 milliGRAM(s) Oral daily  enoxaparin Injectable 30 milliGRAM(s) SubCutaneous <User Schedule>  levETIRAcetam 1000 milliGRAM(s) Oral two times a day  nicotine - 21 mG/24Hr(s) Patch 1 patch Transdermal daily    MEDICATIONS  (PRN):  acetaminophen   Tablet .. 650 milliGRAM(s) Oral every 6 hours PRN Temp greater or equal to 38C (100.4F), Mild Pain (1 - 3), Moderate Pain (4 - 6), Severe Pain (7 - 10)  hydrALAZINE Injectable 5 milliGRAM(s) IV Push every 3 hours PRN SBP>140            Vital Signs Last 24 Hrs  T(C): 36.7 (24 Jun 2019 12:00), Max: 36.7 (23 Jun 2019 16:00)  T(F): 98 (24 Jun 2019 12:00), Max: 98.1 (23 Jun 2019 20:00)  HR: 81 (24 Jun 2019 14:00) (56 - 94)  BP: 166/92 (24 Jun 2019 14:00) (128/68 - 166/92)  BP(mean): 112 (24 Jun 2019 14:00) (85 - 116)  RR: 11 (24 Jun 2019 14:00) (11 - 20)  SpO2: 97% (24 Jun 2019 14:00) (94% - 100%) on [facility]            06-23 @ 07:01  -  06-24 @ 07:00  --------------------------------------------------------  IN: 660 mL / OUT: 1000 mL / NET: -340 mL          LABS:                        13.6   9.7   )-----------( 280      ( 23 Jun 2019 22:49 )             43.6     06-23    140  |  102  |  21  ----------------------------<  139<H>  4.1   |  25  |  0.94    Ca    9.2      23 Jun 2019 22:49  Phos  4.1     06-23  Mg     1.6     06-23            CAPILLARY BLOOD GLUCOSE                          CULTURES: (if applicable)  Culture Results:   No growth to date. (06-20 @ 22:03)  Culture Results:   No growth to date. (06-20 @ 22:03)        Physical Examination:    General: No acute distress.      HEENT: Pupils equal, reactive to light.  Symmetric.    PULM: Clear to auscultation bilaterally, no significant sputum production    CVS: S1, S2 no murm    ABD: Soft, nondistended, nontender, normoactive bowel sounds, no masses    EXT: No edema, nontender    SKIN: Warm and well perfused, no rashes noted.    NEURO: Alert, oriented, interactive, LUE paralysis     RADIOLOGY REVIEWED  CXR: 6/20: Central congestion, grossly clear

## 2019-06-24 NOTE — DISCHARGE NOTE PROVIDER - CARE PROVIDER_API CALL
Alonzo Heard)  Neurology; Vascular Neurology  [address], [address]  [place] [address]  Phone: [phone]  Fax: [phone]  Follow Up Time: 2 weeks    Demetris Barrios)  Neurological Surgery  [address] [place] [address]  Phone: [phone]  Fax: [phone]  Follow Up Time: 2 weeks    Yamile Franco)  Clinical Neurophysiology; EEGEpilepsy; Neurology; Sleep Medicine  [address] [place] [address], [facility] [address]  [place] [address]  Phone: [phone]  Fax: [phone]  Follow Up Time: 1 month    Lito Miose)  Internal Medicine; Nuclear Cardiology  [address] [place] [address]  Phone: [phone]  Fax: [phone]  Follow Up Time: 2 weeks    Sal Soto ()  Critical Care Medicine; Internal Medicine; Pulmonary Disease  [address], [facility] [address]  [place] [address]  Phone: [phone]  Fax: [phone]  Follow Up Time: 1 month Alonzo Heard)  Neurology; Vascular Neurology  [address], [address]  [place] [address]  Phone: [phone]  Fax: [phone]  Follow Up Time: 2 weeks    Demetris Barrios)  Neurological Surgery  [address] [place] [address]  Phone: [phone]  Fax: [phone]  Follow Up Time: 2 weeks    Yamile Franco)  Clinical Neurophysiology; EEGEpilepsy; Neurology; Sleep Medicine  [address] [place] [address], [address]  [place] [address]  Phone: [phone]  Fax: [phone]  Follow Up Time: 1 month    Lito Moise)  Internal Medicine; Nuclear Cardiology  [address] [place] [address]  Phone: [phone]  Fax: [phone]  Follow Up Time: 2 weeks    Sal Soto ()  Critical Care Medicine; Internal Medicine; Pulmonary Disease  [address], [facility] [address]  [place] [address]  Phone: [phone]  Fax: [phone]  Follow Up Time: 1 month    Bonifacio Rivera)  Thoracic and Cardiac Surgery  [address] [place] [address]  Phone: [phone]  Fax: [phone]  Follow Up Time: 1 month

## 2019-06-24 NOTE — DISCHARGE NOTE PROVIDER - PROVIDER TOKENS
PROVIDER:[TOKEN:[[id number]],FOLLOWUP:[2 weeks]],PROVIDER:[TOKEN:[[id number]],FOLLOWUP:[2 weeks]],PROVIDER:[TOKEN:[[id number]],FOLLOWUP:[1 month]],PROVIDER:[TOKEN:[[id number]],FOLLOWUP:[2 weeks]],PROVIDER:[TOKEN:[[id number]],FOLLOWUP:[1 month]] PROVIDER:[TOKEN:[[id number]],FOLLOWUP:[2 weeks]],PROVIDER:[TOKEN:[[id number]],FOLLOWUP:[2 weeks]],PROVIDER:[TOKEN:[[id number]],FOLLOWUP:[1 month]],PROVIDER:[TOKEN:[[id number]],FOLLOWUP:[2 weeks]],PROVIDER:[TOKEN:[[id number]],FOLLOWUP:[1 month]],PROVIDER:[TOKEN:[[id number]],FOLLOWUP:[1 month]]

## 2019-06-24 NOTE — CONSULT NOTE ADULT - ASSESSMENT
54 y/o F with PMH of anxiety, current smoker (35 pack yrs) presents with L arm numbness found to have R MCA CVA s/p R ICA stent 6/21 and severe R carotid artery stenosis. Called to eval for hypoxia at night and concern for underlying COPD. Patient states she is not currently dyspneic, does endorse snoring at night,  has witnessed periods of apnea. 52 y/o F with PMH of anxiety, current smoker (35 pack yrs) presents with L arm numbness found to have R MCA CVA s/p R ICA stent 6/21 and severe R carotid artery stenosis. Called to eval for hypoxia at night and concern for underlying COPD. Patient states she is not currently dyspneic, does endorse snoring at night,  has witnessed periods of apnea. TTE with severe diastolic dysfunction, global LV dysfunction (EF 35%), severe pHTN with RV systolic dysfunction

## 2019-06-24 NOTE — CONSULT NOTE ADULT - PROBLEM SELECTOR RECOMMENDATION 9
Hypoxia likely 2nd NIMESH  -CPAP @5 qHS  -Dedicated outpt PSG, will not qualify for home CPAP from hospital Hypoxia likely 2nd NIMESH  -Epimeric CPAP @5 qHS  -Dedicated outpt PSG, will not qualify for home CPAP from hospital

## 2019-06-24 NOTE — DISCHARGE NOTE PROVIDER - CARE PROVIDERS DIRECT ADDRESSES
,[email or web address],DirectAddress_Unknown,[email or web address],DirectAddress_Unknown ,[email or web address],DirectAddress_Unknown,[email or web address],[email or web address],[email or web address]

## 2019-06-24 NOTE — PROGRESS NOTE ADULT - ASSESSMENT
52y/o F RH  PMH Anxiety, 40 pk/yr smoking hx(still active), does not get regular checkups presented to [facility] with left arm numbness, spasms starting 20:00 on 6/19. A phone consult was performed with [facility] and during that time she had some worsening of her neurological status but left arm weakness and spasms. At that time she was given Ativan for possible seizure. CT noncontrast and CT angiogram of head and neck revealed right frontal hypodensity suggestive of right MCA infarction in the setting of bilateral severe carotid stenosis. She was then transferred to [facility], on exam NIHSS was 6, alert, awake however unable to move her left arm. She was also noticed to have continuous twitching of left arm and face, drop in oxygen saturation for which she was put on BiPAP.    Impression:  #1 Right MCA artery ischemic infarct in the setting of large artery atherosclerosis - infarct pattern suggestive of watershed MCA -PCA and CORBIN-MCA and MCA embolic appearing infarct in the setting of Right carotid severe stenosis  #2 bilateral carotid extracranial stenosis    NEURO: She is s/p right SANDIE stent place on 6/21/19 with Dr. Barrios. Carotid dopplers performed and will review with neurosurgery. Pt was hypertensive on 06/22/19, and was transferred to [facility] for further care. Now neurologically without acute change. Continue close monitoring for neurologic deterioration, -140 to gradual normotension. Continue high dose statin. MRI Brain w/o contrast showed acute right frontoparietal and temporal lobe infarcts. On 6/20 AM she was noted to have jerking movements of the left hand to LUE with subsequent fascial twitching and resolved with 2mg of IV Ativan, 1g of Keppra. VEEG with no epileptiform abnormalities or seizures recorded. Continue Keppra 1g BID. Physical therapy/OT with recommendations for AR, continue current rehab treatment.    ANTITHROMBOTIC THERAPY: ASA/plavix s/p SANDIE stent. P2Y12 06/22 was 84 indicating optimal platelet inhibition.     PULMONARY: CXR(06/20/19) Limited evaluation of the left lung likely secondary to poor penetration and/or overlying soft tissue. Low lung volumes. No focal consolidation. Previously on BiPAP and high flow O2 now saturating well on nasal cannula.     CARDIOVASCULAR: TTE : EF 30-35%, severely dilated left atrium, severe diastolic dysfunction, no PFO , Pt was hypertensive on 06/22/19, and was transferred to [facility] for further care, cardiac monitoring shows bradycardia HR 36-39 on 06/22/19. Dr Suma quinones (Cardiology) appreciated     SBP goal: -140    GASTROINTESTINAL:  dysphagia screen passed, tolerating diet     Diet: Regular    RENAL: BUN/Cr without acute change on 6/23.      Na Goal: Greater than 135     Nichole: No    HEMATOLOGY: H/H without acute change on 6/23. No signs of bleeding noted.      DVT ppx: lovenox    ID: afebrile, no signs of infection.     OTHER: Plan endorsed to patient and her family at bedside, all questions and concerns addressed. Current smoker- smoking cessation and education provided. Nicotine patch ordered. LUE splint ordered  Plan discussed with [facility], stable to transfer to stroke unit when bed available.     DISPOSITION: AR as per PMR stacie once stable and workup is complete    CORE MEASURES:        Admission NIHSS: 6     TPA: NO      LDL/HDL: 71/27     Depression Screen: PENDING     Statin Therapy: yes     Dysphagia Screen: PASS     Smoking YES     Afib  NO     Stroke Education YES    Obtain screening lower extremity venous ultrasound in patients who meet 1 or more of the following criteria as patient is high risk for DVT/PE on admission:   [] History of DVT/PE  []Hypercoagulable states (Factor V Leiden, Cancer, OCP, etc. )  []Prolonged immobility (hemiplegia/hemiparesis/post operative or any other extended immobilization)  [] Transferred from outside facility (Rehab or Long term care)  [] Age </= to 50 54y/o F RH  PMH Anxiety, 40 pk/yr smoking hx(still active), does not get regular checkups presented to [facility] with left arm numbness, spasms starting 20:00 on 6/19. A phone consult was performed with [facility] and during that time she had some worsening of her neurological status but left arm weakness and spasms. At that time she was given Ativan for possible seizure. CT noncontrast and CT angiogram of head and neck revealed right frontal hypodensity suggestive of right MCA infarction in the setting of bilateral severe carotid stenosis. She was then transferred to [facility], on exam NIHSS was 6, alert, awake however unable to move her left arm. She was also noticed to have continuous twitching of left arm and face, drop in oxygen saturation for which she was put on BiPAP.    Impression:  #1 Right MCA artery ischemic infarct in the setting of large artery atherosclerosis - infarct pattern suggestive of watershed MCA -PCA and CORBIN-MCA and MCA embolic appearing infarct in the setting of Right carotid severe stenosis  #2 bilateral carotid extracranial stenosis    NEURO: She is s/p right SANDIE stent place on 6/21/19 with Dr. Barrios. Carotid dopplers performed and will follow up official read. Pt was hypertensive on 06/22/19, and was transferred to [facility] for further care, now stabilized. She is neurologically without acute change and remains with LUE plegia. Continue close monitoring for neurologic deterioration, -140 to gradual normotension. Continue high dose statin. MRI Brain w/o contrast showed acute right frontoparietal and temporal lobe infarcts. On 6/20 AM she was noted to have jerking movements of the left hand to LUE with subsequent fascial twitching and resolved with 2mg of IV Ativan, 1g of Keppra. VEEG with no epileptiform abnormalities or seizures recorded. Continue Keppra 1g BID. Physical therapy/OT with recommendations for AR, continue current rehab treatment. LUE splint ordered.     ANTITHROMBOTIC THERAPY: ASA/plavix s/p SANDIE stent. P2Y12 06/22 was 84 indicating optimal platelet inhibition.     PULMONARY: CXR(06/20/19) No focal consolidation. Patient presented with acute respiratory failure due to acute pulmonary edema treated with BIPAP and IV lasix and has now resolved.  Currently saturating well on nasal cannula. Advised by pulmonary to start on CPAP for NIMESH. Will recommend outpatient sleep study.     CARDIOVASCULAR: TTE : EF 30-35%, severely dilated left atrium, severe diastolic dysfunction, no PFO , Pt was hypertensive on 06/22/19, and was transferred to [facility] for further care, cardiac monitoring shows bradycardia HR 36-39 on 06/22/19. Dr Suma quinones (Cardiology) appreciated     SBP goal: -140    GASTROINTESTINAL:  dysphagia screen passed, tolerating diet     Diet: Regular    RENAL: BUN/Cr without acute change on 6/23.      Na Goal: Greater than 135     Nichole: No    HEMATOLOGY: H/H without acute change on 6/23. No signs of bleeding noted.      DVT ppx: lovenox    ID: afebrile, no signs of infection.     OTHER: Plan endorsed to patient and her family at bedside, all questions and concerns addressed. Current smoker- smoking cessation and education provided. Nicotine patch ordered. Plan discussed with [facility], stable to transfer to stroke unit when bed available.     DISPOSITION: AR once rehab bed available     CORE MEASURES:        Admission NIHSS: 6     TPA: NO      LDL/HDL: 71/27     Depression Screen: 0     Statin Therapy: yes     Dysphagia Screen: PASS     Smoking YES     Afib  NO     Stroke Education YES    Obtain screening lower extremity venous ultrasound in patients who meet 1 or more of the following criteria as patient is high risk for DVT/PE on admission:   [] History of DVT/PE  []Hypercoagulable states (Factor V Leiden, Cancer, OCP, etc. )  []Prolonged immobility (hemiplegia/hemiparesis/post operative or any other extended immobilization)  [] Transferred from outside facility (Rehab or Long term care)  [] Age </= to 50 52y/o F RH  PMH Anxiety, 40 pk/yr smoking hx(still active), does not get regular checkups presented to [facility] with left arm numbness, spasms starting 20:00 on 6/19. A phone consult was performed with [facility] and during that time she had some worsening of her neurological status but left arm weakness and spasms. At that time she was given Ativan for possible seizure. CT noncontrast and CT angiogram of head and neck revealed right frontal hypodensity suggestive of right MCA infarction in the setting of bilateral severe carotid stenosis. She was then transferred to [facility], on exam NIHSS was 6, alert, awake however unable to move her left arm. She was also noticed to have continuous twitching of left arm and face, drop in oxygen saturation for which she was put on BiPAP.    Impression:  #1 Right MCA artery ischemic infarct in the setting of large artery atherosclerosis - infarct pattern suggestive of watershed MCA -PCA and CORBIN-MCA and MCA embolic appearing infarct in the setting of Right carotid severe stenosis  #2 bilateral carotid extracranial stenosis    NEURO: She is s/p SANDIE stent place on 6/21/19 with Dr. Barrios. Carotid dopplers performed and will follow up official read. Pt was hypertensive on 06/22/19, and was transferred to [facility] for further care, now stabilized. She is neurologically without acute change and remains with LUE plegia. Continue close monitoring for neurologic deterioration, -140 to gradual normotension. Continue high dose statin. MRI Brain w/o contrast showed acute right frontoparietal and temporal lobe infarcts. On 6/20 AM she was noted to have jerking movements of the left hand to LUE with subsequent fascial twitching and resolved with 2mg of IV Ativan, 1g of Keppra. VEEG with no epileptiform abnormalities or seizures recorded. Continue Keppra 1g BID. Physical therapy/OT with recommendations for AR, continue current rehab treatment. LUE splint ordered.     ANTITHROMBOTIC THERAPY: ASA/plavix s/p SANDIE stent. P2Y12 06/22 was 84 indicating optimal platelet inhibition.     PULMONARY: CXR(06/20/19) No focal consolidation. Patient presented with acute respiratory failure due to acute pulmonary edema treated with BIPAP and IV lasix and has now resolved.  Currently saturating well on nasal cannula. Advised by pulmonary to start on CPAP for NIMESH. Will recommend outpatient sleep study.     CARDIOVASCULAR: TTE : EF 30-35%, severely dilated left atrium, severe diastolic dysfunction, no PFO , Pt was hypertensive on 06/22/19, and was transferred to [facility] for further care, cardiac monitoring shows bradycardia HR 36-39 on 06/22/19. Dr Suma quinones (Cardiology) appreciated     SBP goal: -140    GASTROINTESTINAL:  dysphagia screen passed, tolerating diet     Diet: Regular    RENAL: BUN/Cr without acute change on 6/23.      Na Goal: Greater than 135     Nichole: No    HEMATOLOGY: H/H without acute change on 6/23. No signs of bleeding noted.      DVT ppx: lovenox    ID: afebrile, no signs of infection.     OTHER: Plan endorsed to patient and her family at bedside, all questions and concerns addressed. Current smoker- smoking cessation and education provided. Nicotine patch ordered. Plan discussed with [facility], stable to transfer to stroke unit when bed available.     DISPOSITION: AR once rehab bed available     CORE MEASURES:        Admission NIHSS: 6     TPA: NO      LDL/HDL: 71/27     Depression Screen: 0     Statin Therapy: yes     Dysphagia Screen: PASS     Smoking YES     Afib  NO     Stroke Education YES    Obtain screening lower extremity venous ultrasound in patients who meet 1 or more of the following criteria as patient is high risk for DVT/PE on admission:   [] History of DVT/PE  []Hypercoagulable states (Factor V Leiden, Cancer, OCP, etc. )  []Prolonged immobility (hemiplegia/hemiparesis/post operative or any other extended immobilization)  [] Transferred from outside facility (Rehab or Long term care)  [] Age </= to 50

## 2019-06-25 DIAGNOSIS — I50.41 ACUTE COMBINED SYSTOLIC (CONGESTIVE) AND DIASTOLIC (CONGESTIVE) HEART FAILURE: ICD-10-CM

## 2019-06-25 PROBLEM — F41.9 ANXIETY DISORDER, UNSPECIFIED: Chronic | Status: ACTIVE | Noted: 2019-06-19

## 2019-06-25 LAB
ANION GAP SERPL CALC-SCNC: 11 MMOL/L — SIGNIFICANT CHANGE UP (ref 5–17)
BUN SERPL-MCNC: 14 MG/DL — SIGNIFICANT CHANGE UP (ref 7–23)
CALCIUM SERPL-MCNC: 9.4 MG/DL — SIGNIFICANT CHANGE UP (ref 8.4–10.5)
CHLORIDE SERPL-SCNC: 104 MMOL/L — SIGNIFICANT CHANGE UP (ref 96–108)
CO2 SERPL-SCNC: 26 MMOL/L — SIGNIFICANT CHANGE UP (ref 22–31)
CREAT SERPL-MCNC: 0.78 MG/DL — SIGNIFICANT CHANGE UP (ref 0.5–1.3)
CULTURE RESULTS: SIGNIFICANT CHANGE UP
CULTURE RESULTS: SIGNIFICANT CHANGE UP
GLUCOSE SERPL-MCNC: 100 MG/DL — HIGH (ref 70–99)
MAGNESIUM SERPL-MCNC: 1.6 MG/DL — SIGNIFICANT CHANGE UP (ref 1.6–2.6)
PHOSPHATE SERPL-MCNC: 4.1 MG/DL — SIGNIFICANT CHANGE UP (ref 2.5–4.5)
POTASSIUM SERPL-MCNC: 4.1 MMOL/L — SIGNIFICANT CHANGE UP (ref 3.5–5.3)
POTASSIUM SERPL-SCNC: 4.1 MMOL/L — SIGNIFICANT CHANGE UP (ref 3.5–5.3)
SODIUM SERPL-SCNC: 141 MMOL/L — SIGNIFICANT CHANGE UP (ref 135–145)
SPECIMEN SOURCE: SIGNIFICANT CHANGE UP
SPECIMEN SOURCE: SIGNIFICANT CHANGE UP

## 2019-06-25 PROCEDURE — 93458 L HRT ARTERY/VENTRICLE ANGIO: CPT | Mod: 26,GC

## 2019-06-25 PROCEDURE — 99233 SBSQ HOSP IP/OBS HIGH 50: CPT

## 2019-06-25 PROCEDURE — 99152 MOD SED SAME PHYS/QHP 5/>YRS: CPT | Mod: GC

## 2019-06-25 PROCEDURE — 99232 SBSQ HOSP IP/OBS MODERATE 35: CPT

## 2019-06-25 PROCEDURE — 99221 1ST HOSP IP/OBS SF/LOW 40: CPT

## 2019-06-25 RX ORDER — CLOPIDOGREL BISULFATE 75 MG/1
1 TABLET, FILM COATED ORAL
Qty: 0 | Refills: 0 | DISCHARGE
Start: 2019-06-25

## 2019-06-25 RX ORDER — LISINOPRIL 2.5 MG/1
1 TABLET ORAL
Qty: 0 | Refills: 0 | DISCHARGE
Start: 2019-06-25

## 2019-06-25 RX ORDER — LEVETIRACETAM 250 MG/1
1 TABLET, FILM COATED ORAL
Qty: 0 | Refills: 0 | DISCHARGE
Start: 2019-06-25

## 2019-06-25 RX ORDER — ALBUTEROL 90 UG/1
2 AEROSOL, METERED ORAL EVERY 6 HOURS
Refills: 0 | Status: DISCONTINUED | OUTPATIENT
Start: 2019-06-25 | End: 2019-06-27

## 2019-06-25 RX ORDER — ACETAMINOPHEN 500 MG
2 TABLET ORAL
Qty: 0 | Refills: 0 | DISCHARGE
Start: 2019-06-25

## 2019-06-25 RX ORDER — ASPIRIN/CALCIUM CARB/MAGNESIUM 324 MG
1 TABLET ORAL
Qty: 0 | Refills: 0 | DISCHARGE
Start: 2019-06-25

## 2019-06-25 RX ORDER — NICOTINE POLACRILEX 2 MG
1 GUM BUCCAL
Qty: 0 | Refills: 0 | DISCHARGE
Start: 2019-06-25

## 2019-06-25 RX ORDER — IPRATROPIUM/ALBUTEROL SULFATE 18-103MCG
3 AEROSOL WITH ADAPTER (GRAM) INHALATION
Qty: 0 | Refills: 0 | DISCHARGE
Start: 2019-06-25

## 2019-06-25 RX ORDER — GLYCOPYRROLATE AND FORMOTEROL FUMARATE 9; 4.8 UG/1; UG/1
2 AEROSOL, METERED RESPIRATORY (INHALATION)
Refills: 0 | Status: DISCONTINUED | OUTPATIENT
Start: 2019-06-25 | End: 2019-06-27

## 2019-06-25 RX ORDER — HYDRALAZINE HCL 50 MG
5 TABLET ORAL
Refills: 0 | Status: DISCONTINUED | OUTPATIENT
Start: 2019-06-25 | End: 2019-06-25

## 2019-06-25 RX ORDER — BUDESONIDE, MICRONIZED 100 %
0.25 POWDER (GRAM) MISCELLANEOUS
Qty: 0 | Refills: 0 | DISCHARGE
Start: 2019-06-25

## 2019-06-25 RX ORDER — ATORVASTATIN CALCIUM 80 MG/1
1 TABLET, FILM COATED ORAL
Qty: 0 | Refills: 0 | DISCHARGE
Start: 2019-06-25

## 2019-06-25 RX ADMIN — ENOXAPARIN SODIUM 30 MILLIGRAM(S): 100 INJECTION SUBCUTANEOUS at 06:11

## 2019-06-25 RX ADMIN — Medication 0.25 MILLIGRAM(S): at 05:55

## 2019-06-25 RX ADMIN — Medication 650 MILLIGRAM(S): at 20:44

## 2019-06-25 RX ADMIN — Medication 1 PATCH: at 07:25

## 2019-06-25 RX ADMIN — LEVETIRACETAM 1000 MILLIGRAM(S): 250 TABLET, FILM COATED ORAL at 17:16

## 2019-06-25 RX ADMIN — LISINOPRIL 5 MILLIGRAM(S): 2.5 TABLET ORAL at 06:11

## 2019-06-25 RX ADMIN — LEVETIRACETAM 1000 MILLIGRAM(S): 250 TABLET, FILM COATED ORAL at 06:12

## 2019-06-25 RX ADMIN — CLOPIDOGREL BISULFATE 75 MILLIGRAM(S): 75 TABLET, FILM COATED ORAL at 11:26

## 2019-06-25 RX ADMIN — Medication 1 PATCH: at 20:22

## 2019-06-25 RX ADMIN — Medication 1 PATCH: at 11:09

## 2019-06-25 RX ADMIN — Medication 325 MILLIGRAM(S): at 11:26

## 2019-06-25 RX ADMIN — Medication 3 MILLILITER(S): at 11:41

## 2019-06-25 RX ADMIN — ATORVASTATIN CALCIUM 80 MILLIGRAM(S): 80 TABLET, FILM COATED ORAL at 21:45

## 2019-06-25 RX ADMIN — Medication 5 MILLIGRAM(S): at 07:00

## 2019-06-25 RX ADMIN — Medication 650 MILLIGRAM(S): at 21:14

## 2019-06-25 RX ADMIN — Medication 3 MILLILITER(S): at 05:54

## 2019-06-25 RX ADMIN — GLYCOPYRROLATE AND FORMOTEROL FUMARATE 2 PUFF(S): 9; 4.8 AEROSOL, METERED RESPIRATORY (INHALATION) at 18:17

## 2019-06-25 RX ADMIN — Medication 1 PATCH: at 11:10

## 2019-06-25 NOTE — PROGRESS NOTE ADULT - SUBJECTIVE AND OBJECTIVE BOX
THE PATIENT WAS SEEN AND EXAMINED BY ME WITH THE HOUSESTAFF AND STROKE TEAM DURING MORNING ROUNDS.   HPI:  52y/o F RH  PMH Anxiety, 40 pk/yr smoking hx(still active), does not get regular checkups presented to [facility] with left arm numbness, spasms starting 20:00 on 6/19. A phone consult was performed with [facility] and during that time she had some worsening of her neurological status but left arm weakness and spasms. At that time she was given Ativan for possible seizure. CT noncontrast and CT angiogram of head and neck revealed right frontal hypodensity suggestive of right MCA infarction in the setting of bilateral severe carotid stenosis. She was then transferred to [facility], on exam NIHSS was 6, alert, awake however unable to move her left arm. She was also noticed to have continuous twitching of left arm and face, drop in oxygen saturation for which she was put on BiPAP.    SUBJECTIVE: No events overnight.  No new neurologic complaints.      acetaminophen   Tablet .. 650 milliGRAM(s) Oral every 6 hours PRN  ALBUTerol/ipratropium for Nebulization 3 milliLiter(s) Nebulizer every 6 hours  aspirin 325 milliGRAM(s) Oral daily  atorvastatin 80 milliGRAM(s) Oral at bedtime  buDESOnide   0.25 milliGRAM(s) Respule 0.25 milliGRAM(s) Inhalation every 12 hours  chlorhexidine 4% Liquid 1 Application(s) Topical <User Schedule>  clopidogrel Tablet 75 milliGRAM(s) Oral daily  enoxaparin Injectable 30 milliGRAM(s) SubCutaneous <User Schedule>  hydrALAZINE Injectable 5 milliGRAM(s) IV Push every 3 hours PRN  hydrALAZINE Injectable 5 milliGRAM(s) IV Push every 3 hours PRN  levETIRAcetam 1000 milliGRAM(s) Oral two times a day  lisinopril 5 milliGRAM(s) Oral daily  nicotine - 21 mG/24Hr(s) Patch 1 patch Transdermal daily      PHYSICAL EXAM:   Vital Signs Last 24 Hrs  T(C): 36.9 (25 Jun 2019 04:00), Max: 37 (25 Jun 2019 00:00)  T(F): 98.4 (25 Jun 2019 04:00), Max: 98.6 (25 Jun 2019 00:00)  HR: 78 (25 Jun 2019 07:00) (58 - 92)  BP: 166/89 (25 Jun 2019 07:00) (140/66 - 171/96)  BP(mean): 110 (25 Jun 2019 07:00) (85 - 116)  RR: 14 (25 Jun 2019 07:00) (11 - 20)  SpO2: 97% (25 Jun 2019 07:00) (96% - 100%)  General: No acute distress  HEENT: Left gaze palsy, visual fields full  Abdomen: Soft, nontender, nondistended   Extremities: No edema    NEUROLOGICAL EXAM:  Mental status: Awake, alert, oriented x3, fluent speech, follows commands  Cranial Nerves: left facial droop, left gaze palsy able to move eyes up to midline, VFF, no nystagmus, no dysarthria,  tongue midline  Motor exam: RUE 5/5, RLE 5/5 LUE 0/5, LLE drift 4/5  Sensation: Intact to light touch   Coordination/ Gait: No dysmetria, gait not tested    LABS:                        13.6   9.7   )-----------( 280      ( 23 Jun 2019 22:49 )             43.6    06-25    141  |  104  |  14  ----------------------------<  100<H>  4.1   |  26  |  0.78    Ca    9.4      25 Jun 2019 05:37  Phos  4.1     06-25  Mg     1.6     06-25      Hemoglobin A1C, Whole Blood: 6.2 % (06-21 @ 11:12)      IMAGING: Reviewed by me.   VEEG (06/21/19) Normal EEG. There were no epileptiform abnormalities or seizures recorded.     MR Head No Cont (06.20.19) Acute right frontoparietal and temporal lobe infarcts. Possible subacute subcortical white matter infarct right frontal lobe. No acute intracranial hemorrhage. Recommend further imaging of the vascular system with MRAs of the brain and neck.    CT Head No Cont (06.20.19 11:45) No significant change since 6:56 AM. Subtle lucency in the right parietal region in a portion of the right middle cerebral artery distribution. No hemorrhage.    CT Head No Cont (06.20.19 07:25) No interval change in right middle cerebral artery infarct involving the right precentral gyrus and centrum semiovale. No hemorrhage. ASPECTS score 8 out of 10.    CT Head No Cont (06.20.19) Acute right middle cerebral artery infarct involving the right precentral gyrus and centrum semiovale. ASPECTS score 8 out of 10. No hemorrhage or midline shift.     CTA Head and Neck w/ IV Cont (06.20.19) Severe stenosis at the origin of the right internal carotid artery with filling defect in the anterior cavernous segment suspicious for critical stenosis/focal occlusion.  Moderate to severe stenosis of the proximal cervical segment and mild stenosis of the posterior cavernous segment of the left internal carotid artery. THE PATIENT WAS SEEN AND EXAMINED BY ME WITH THE HOUSESTAFF AND STROKE TEAM DURING MORNING ROUNDS.   HPI:  52y/o F RH  PMH Anxiety, 40 pk/yr smoking hx(still active), does not get regular checkups presented to [facility] with left arm numbness, spasms starting 20:00 on 6/19. A phone consult was performed with [facility] and during that time she had some worsening of her neurological status but left arm weakness and spasms. At that time she was given Ativan for possible seizure. CT noncontrast and CT angiogram of head and neck revealed right frontal hypodensity suggestive of right MCA infarction in the setting of bilateral severe carotid stenosis. She was then transferred to [facility], on exam NIHSS was 6, alert, awake however unable to move her left arm. She was also noticed to have continuous twitching of left arm and face, drop in oxygen saturation for which she was put on BiPAP.    SUBJECTIVE: No events overnight.  No new neurologic complaints.      acetaminophen   Tablet .. 650 milliGRAM(s) Oral every 6 hours PRN  ALBUTerol/ipratropium for Nebulization 3 milliLiter(s) Nebulizer every 6 hours  aspirin 325 milliGRAM(s) Oral daily  atorvastatin 80 milliGRAM(s) Oral at bedtime  buDESOnide   0.25 milliGRAM(s) Respule 0.25 milliGRAM(s) Inhalation every 12 hours  chlorhexidine 4% Liquid 1 Application(s) Topical <User Schedule>  clopidogrel Tablet 75 milliGRAM(s) Oral daily  enoxaparin Injectable 30 milliGRAM(s) SubCutaneous <User Schedule>  hydrALAZINE Injectable 5 milliGRAM(s) IV Push every 3 hours PRN  hydrALAZINE Injectable 5 milliGRAM(s) IV Push every 3 hours PRN  levETIRAcetam 1000 milliGRAM(s) Oral two times a day  lisinopril 5 milliGRAM(s) Oral daily  nicotine - 21 mG/24Hr(s) Patch 1 patch Transdermal daily      PHYSICAL EXAM:   Vital Signs Last 24 Hrs  T(C): 36.9 (25 Jun 2019 04:00), Max: 37 (25 Jun 2019 00:00)  T(F): 98.4 (25 Jun 2019 04:00), Max: 98.6 (25 Jun 2019 00:00)  HR: 78 (25 Jun 2019 07:00) (58 - 92)  BP: 166/89 (25 Jun 2019 07:00) (140/66 - 171/96)  BP(mean): 110 (25 Jun 2019 07:00) (85 - 116)  RR: 14 (25 Jun 2019 07:00) (11 - 20)  SpO2: 97% (25 Jun 2019 07:00) (96% - 100%)    General: No acute distress  HEENT: Left gaze palsy in form of corrective seccads on left gaze, visual fields full  Abdomen: Soft, nontender, nondistended   Extremities: No edema    NEUROLOGICAL EXAM:  Mental status: Awake, alert, oriented x3, fluent speech, follows commands  Cranial Nerves: left facial droop, left gaze palsy able to move eyes up to midline, VFF, no nystagmus, no dysarthria,  tongue midline  Motor exam: RUE 5/5, RLE 5/5 LUE 0/5, LLE drift 5/5  Sensation: Intact to light touch   Coordination/ Gait: No dysmetria, gait not tested    LABS:                        13.6   9.7   )-----------( 280      ( 23 Jun 2019 22:49 )             43.6    06-25    141  |  104  |  14  ----------------------------<  100<H>  4.1   |  26  |  0.78    Ca    9.4      25 Jun 2019 05:37  Phos  4.1     06-25  Mg     1.6     06-25      Hemoglobin A1C, Whole Blood: 6.2 % (06-21 @ 11:12)      IMAGING: Reviewed by me.   VEEG (06/21/19) Normal EEG. There were no epileptiform abnormalities or seizures recorded.     MR Head No Cont (06.20.19) Acute right frontoparietal and temporal lobe infarcts. Possible subacute subcortical white matter infarct right frontal lobe. No acute intracranial hemorrhage. Recommend further imaging of the vascular system with MRAs of the brain and neck.    CT Head No Cont (06.20.19 11:45) No significant change since 6:56 AM. Subtle lucency in the right parietal region in a portion of the right middle cerebral artery distribution. No hemorrhage.    CT Head No Cont (06.20.19 07:25) No interval change in right middle cerebral artery infarct involving the right precentral gyrus and centrum semiovale. No hemorrhage. ASPECTS score 8 out of 10.    CT Head No Cont (06.20.19) Acute right middle cerebral artery infarct involving the right precentral gyrus and centrum semiovale. ASPECTS score 8 out of 10. No hemorrhage or midline shift.     CTA Head and Neck w/ IV Cont (06.20.19) Severe stenosis at the origin of the right internal carotid artery with filling defect in the anterior cavernous segment suspicious for critical stenosis/focal occlusion.  Moderate to severe stenosis of the proximal cervical segment and mild stenosis of the posterior cavernous segment of the left internal carotid artery. THE PATIENT WAS SEEN AND EXAMINED BY ME WITH THE HOUSESTAFF AND STROKE TEAM DURING MORNING ROUNDS.   HPI:  54y/o F RH  PMH Anxiety, 40 pk/yr smoking hx(still active), does not get regular checkups presented to [facility] with left arm numbness, spasms starting 20:00 on 6/19. A phone consult was performed with [facility] and during that time she had some worsening of her neurological status but left arm weakness and spasms. At that time she was given Ativan for possible seizure. CT noncontrast and CT angiogram of head and neck revealed right frontal hypodensity suggestive of right MCA infarction in the setting of bilateral severe carotid stenosis. She was then transferred to [facility], on exam NIHSS was 6, alert, awake however unable to move her left arm. She was also noticed to have continuous twitching of left arm and face, drop in oxygen saturation for which she was put on BiPAP.    SUBJECTIVE: No events overnight.  No new neurologic complaints.      acetaminophen   Tablet .. 650 milliGRAM(s) Oral every 6 hours PRN  ALBUTerol/ipratropium for Nebulization 3 milliLiter(s) Nebulizer every 6 hours  aspirin 325 milliGRAM(s) Oral daily  atorvastatin 80 milliGRAM(s) Oral at bedtime  buDESOnide   0.25 milliGRAM(s) Respule 0.25 milliGRAM(s) Inhalation every 12 hours  chlorhexidine 4% Liquid 1 Application(s) Topical <User Schedule>  clopidogrel Tablet 75 milliGRAM(s) Oral daily  enoxaparin Injectable 30 milliGRAM(s) SubCutaneous <User Schedule>  hydrALAZINE Injectable 5 milliGRAM(s) IV Push every 3 hours PRN  hydrALAZINE Injectable 5 milliGRAM(s) IV Push every 3 hours PRN  levETIRAcetam 1000 milliGRAM(s) Oral two times a day  lisinopril 5 milliGRAM(s) Oral daily  nicotine - 21 mG/24Hr(s) Patch 1 patch Transdermal daily      PHYSICAL EXAM:   Vital Signs Last 24 Hrs  T(C): 36.9 (25 Jun 2019 04:00), Max: 37 (25 Jun 2019 00:00)  T(F): 98.4 (25 Jun 2019 04:00), Max: 98.6 (25 Jun 2019 00:00)  HR: 78 (25 Jun 2019 07:00) (58 - 92)  BP: 166/89 (25 Jun 2019 07:00) (140/66 - 171/96)  BP(mean): 110 (25 Jun 2019 07:00) (85 - 116)  RR: 14 (25 Jun 2019 07:00) (11 - 20)  SpO2: 97% (25 Jun 2019 07:00) (96% - 100%)    General: No acute distress  HEENT: Left gaze palsy in form of corrective seccads on left gaze, visual fields full  Abdomen: Soft, nontender, nondistended   Extremities: No edema    NEUROLOGICAL EXAM:  Mental status: Awake, alert, oriented x3, fluent speech, follows commands  Cranial Nerves: left facial droop, subtle left gaze palsy-saccades are not quite complete and require slight corrective saccade on left gaze; , VFF, no nystagmus, no dysarthria,  tongue midline  Motor exam: RUE 5/5, RLE 5/5 LUE 0/5, LLE drift 5/5  Sensation: Intact to light touch   Coordination/ Gait: No dysmetria, gait not tested    LABS:                        13.6   9.7   )-----------( 280      ( 23 Jun 2019 22:49 )             43.6    06-25    141  |  104  |  14  ----------------------------<  100<H>  4.1   |  26  |  0.78    Ca    9.4      25 Jun 2019 05:37  Phos  4.1     06-25  Mg     1.6     06-25      Hemoglobin A1C, Whole Blood: 6.2 % (06-21 @ 11:12)      IMAGING: Reviewed by me.   VEEG (06/21/19) Normal EEG. There were no epileptiform abnormalities or seizures recorded.     MR Head No Cont (06.20.19) Acute right frontoparietal and temporal lobe infarcts. Possible subacute subcortical white matter infarct right frontal lobe. No acute intracranial hemorrhage. Recommend further imaging of the vascular system with MRAs of the brain and neck.    CT Head No Cont (06.20.19 11:45) No significant change since 6:56 AM. Subtle lucency in the right parietal region in a portion of the right middle cerebral artery distribution. No hemorrhage.    CT Head No Cont (06.20.19 07:25) No interval change in right middle cerebral artery infarct involving the right precentral gyrus and centrum semiovale. No hemorrhage. ASPECTS score 8 out of 10.    CT Head No Cont (06.20.19) Acute right middle cerebral artery infarct involving the right precentral gyrus and centrum semiovale. ASPECTS score 8 out of 10. No hemorrhage or midline shift.     CTA Head and Neck w/ IV Cont (06.20.19) Severe stenosis at the origin of the right internal carotid artery with filling defect in the anterior cavernous segment suspicious for critical stenosis/focal occlusion.  Moderate to severe stenosis of the proximal cervical segment and mild stenosis of the posterior cavernous segment of the left internal carotid artery.

## 2019-06-25 NOTE — DIETITIAN INITIAL EVALUATION ADULT. - OTHER INFO
Pt reports good PO intake PTA; consumes on average 3 meals daily. Denies following therapeutic/modified consistency diet PTA and endorsed enjoyment of most foods. Limits products containing Aspartame 2/2 triggers onset of migraines. Denies chewing/swallowing difficulties, denies food allergies. Denies hx of chronic/acute N/V/C/diarrhea. Pt unsure of UBW, however reports likely stable as no overt changes in physical appearance. In-house, pt reports good PO intake and appetite; no overt changes noted. Good intake of breakfast this morning. A1c 6.2%; pt endorsed pre-diabetes and receptive to education provided by this RD. Skin: surgical incision to right groin s/p angio; Edema: 1+ left arm, left hand, left wrist. Stool count: (6/24): x 2, (6/23): x 1.

## 2019-06-25 NOTE — DIETITIAN INITIAL EVALUATION ADULT. - ENERGY NEEDS
Pt is a 52 yo F with PMH: anxiety, active smoker (40 pks/year). Presented to outside hospital with left arm numbness, spasms. CT angio of head and neck revealed right frontal hypodensity suggestive of right MCA infarction in setting of bilateral severe carotid stenosis. Pt s/p SANDIE stent placed 6/21/19 and transferred to [facility]. 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 Followed closely by cardiology.

## 2019-06-25 NOTE — PROGRESS NOTE ADULT - SUBJECTIVE AND OBJECTIVE BOX
Cardiology Follow Up  ==========================================  CC: CVA, HF, HTN    HPI: Patient has CVA with bilateral carotid disease s/p carotid stent. Tolerated procedure well. She also has HTN with significant elevations. BP is better controlled but still elevated. She has new severe LV dysfunction. Volume status seems reasonably compensated. Nocturnal hypoxia suspected to be NIMESH.     Review Of Systems:  General: No fevers  GI: No nausea, no abdominal pain  Other wise negative except as documented above      Medications:    ALBUTerol/ipratropium for Nebulization   3 milliLiter(s) Nebulizer (06-25-19 @ 05:54)   3 milliLiter(s) Nebulizer (06-24-19 @ 23:45)   3 milliLiter(s) Nebulizer (06-24-19 @ 17:18)   3 milliLiter(s) Nebulizer (06-24-19 @ 11:12)    aspirin   325 milliGRAM(s) Oral (06-24-19 @ 12:09)    atorvastatin   80 milliGRAM(s) Oral (06-24-19 @ 21:33)    buDESOnide   0.25 milliGRAM(s) Respule   0.25 milliGRAM(s) Inhalation (06-25-19 @ 05:55)   0.25 milliGRAM(s) Inhalation (06-24-19 @ 17:18)    chlorhexidine 4% Liquid   1 Application(s) Topical (06-24-19 @ 21:33)    clopidogrel Tablet   75 milliGRAM(s) Oral (06-24-19 @ 12:11)    enoxaparin Injectable   30 milliGRAM(s) SubCutaneous (06-25-19 @ 06:11)   30 milliGRAM(s) SubCutaneous (06-24-19 @ 17:35)    hydrALAZINE Injectable   5 milliGRAM(s) IV Push (06-24-19 @ 20:19)   5 milliGRAM(s) IV Push (06-24-19 @ 16:30)    levETIRAcetam   1000 milliGRAM(s) Oral (06-25-19 @ 06:12)   1000 milliGRAM(s) Oral (06-24-19 @ 17:34)    lisinopril   5 milliGRAM(s) Oral (06-25-19 @ 06:11)   5 milliGRAM(s) Oral (06-24-19 @ 17:34)    nicotine - 21 mG/24Hr(s) Patch   1 Patch Transdermal (06-24-19 @ 12:08)        Telemetry: Transient sinus concepción overnight.     Vital Signs Last 24 Hrs  T(C): 37 (25 Jun 2019 07:00), Max: 37 (25 Jun 2019 00:00)  T(F): 98.6 (25 Jun 2019 07:00), Max: 98.6 (25 Jun 2019 00:00)  HR: 76 (25 Jun 2019 09:00) (58 - 92)  BP: 143/77 (25 Jun 2019 08:00) (140/66 - 171/96)  BP(mean): 94 (25 Jun 2019 08:00) (85 - 116)  RR: 15 (25 Jun 2019 09:00) (11 - 20)  SpO2: 97% (25 Jun 2019 09:00) (96% - 100%)  I&O's Summary    24 Jun 2019 07:01  -  25 Jun 2019 07:00  --------------------------------------------------------  IN: 1475 mL / OUT: 4050 mL / NET: -2575 mL        Physical Exam:  General: [x ] NAD  Eyes: [x ] EOMI [ x] PERRL  HENT: [x] MMM  Neck: [x] No JVD  CV: [x ] S1,S2 [x ] RRR [x ] No M/R/G [x] Nl PMI         [x] No edema  Lungs: [x ] CTA B/L [ x] Normal effort  Abd: [x ] Soft [x ] Non-tender [x ] +BS  Ext: [x ] No cyanosis  Skin:[x] No rashes   Psych: [x] Mood and affect appropriate    Labs:                        13.6   9.7   )-----------( 280      ( 23 Jun 2019 22:49 )             43.6     06-25    141  |  104  |  14  ----------------------------<  100<H>  4.1   |  26  |  0.78    Ca    9.4      25 Jun 2019 05:37  Phos  4.1     06-25  Mg     1.6     06-25

## 2019-06-25 NOTE — PROGRESS NOTE ADULT - ASSESSMENT
52y/o F RH  PMH Anxiety, 40 pk/yr smoking hx(still active), does not get regular checkups presented to [facility] with left arm numbness, spasms starting 20:00 on 6/19. A phone consult was performed with [facility] and during that time she had some worsening of her neurological status but left arm weakness and spasms. At that time she was given Ativan for possible seizure. CT noncontrast and CT angiogram of head and neck revealed right frontal hypodensity suggestive of right MCA infarction in the setting of bilateral severe carotid stenosis. She was then transferred to [facility].     Impression:  #1 Right MCA artery ischemic infarct in the setting of large artery atherosclerosis - infarct pattern suggestive of watershed MCA -PCA and CORBIN-MCA and MCA embolic appearing infarct in the setting of Right carotid severe stenosis  #2 bilateral carotid extracranial stenosis    NEURO: She is s/p SANDIE stent place on 6/21/19 with Dr. Barrios. Carotid dopplers performed: unimpeded antegrade flow thorugh the stented right ICA, severe, >70% stenosis of the left ICA, flow limiting stenosis of b/l ECA. . She is neurologically without acute change and remains with LUE plegia. Continue close monitoring for neurologic deterioration, SBP goal 100-140mmhg in setting of recent stent placement, gradual normotension as well given right ICA stenosis, Continue high dose statin. MRI Brain w/o contrast showed acute right frontoparietal and temporal lobe infarcts. On 6/20 AM she was noted to have jerking movements of the left hand to LUE with subsequent fascial twitching and resolved with 2mg of IV Ativan, 1g of Keppra. VEEG with no epileptiform abnormalities or seizures recorded. Continue Keppra 1g BID. Physical therapy/OT with recommendations for AR, continue current rehab treatment. LUE splint ordered.     ANTITHROMBOTIC THERAPY: ASA/plavix s/p SANDIE stent. P2Y12 06/22 was 84 indicating optimal platelet inhibition.     PULMONARY: CXR(06/20/19) No focal consolidation. Patient presented with acute respiratory failure due to acute pulmonary edema treated with BIPAP and IV lasix and has now resolved.  Currently saturating well on nasal cannula. Advised by pulmonary to start on CPAP for NIMESH. Will recommend outpatient sleep study.     CARDIOVASCULAR: TTE : EF 30-35%, severely dilated left atrium, severe diastolic dysfunction, no PFO. Dr Suma quinones (Cardiology) appreciated, noted preivously with bradycardia and hypertension, cardiac cath possibly as per cardiology recommendations      SBP goal: -140    GASTROINTESTINAL:  dysphagia screen passed, tolerating diet     Diet: Regular    RENAL: BUN/Cr without acute change, maintain adequate hydration as tolerated      Na Goal: Greater than 135     Nichole: No    HEMATOLOGY: H/H without acute change No signs of bleeding noted.      DVT ppx: lovenox    ID: afebrile, no signs of infection.     OTHER: Plan endorsed to patient and her family at bedside, all questions and concerns addressed. Current smoker- smoking cessation and education provided. Nicotine patch  . Plan discussed with [facility], stable to transfer to stroke unit when bed available.     DISPOSITION: AR      CORE MEASURES:        Admission NIHSS: 6     TPA: NO      LDL/HDL: 71/27     Depression Screen: 0     Statin Therapy: yes     Dysphagia Screen: PASS     Smoking YES     Afib  NO     Stroke Education YES    Obtain screening lower extremity venous ultrasound in patients who meet 1 or more of the following criteria as patient is high risk for DVT/PE on admission:   [] History of DVT/PE  []Hypercoagulable states (Factor V Leiden, Cancer, OCP, etc. )  []Prolonged immobility (hemiplegia/hemiparesis/post operative or any other extended immobilization)  [] Transferred from outside facility (Rehab or Long term care)  [] Age </= to 50 54y/o F RH  PMH Anxiety, 40 pk/yr smoking hx(still active), does not get regular checkups presented to [facility] with left arm numbness, spasms starting 20:00 on 6/19. A phone consult was performed with [facility] and during that time she had some worsening of her neurological status but left arm weakness and spasms. At that time she was given Ativan for possible seizure. CT noncontrast and CT angiogram of head and neck revealed right frontal hypodensity suggestive of right MCA infarction in the setting of bilateral severe carotid stenosis. She was then transferred to [facility].     Impression:  #1 Right MCA artery ischemic infarct in the setting of large artery atherosclerosis - infarct pattern suggestive of watershed MCA -PCA and CORBIN-MCA and MCA embolic appearing infarct in the setting of Right carotid severe stenosis  #2 bilateral carotid extracranial stenosis    NEURO: She is s/p right internal carotid stent stent place on 6/21/19 with Dr. Barrios. Carotid dopplers performed: unimpeded antegrade flow thorugh the stented right ICA, severe, >70% stenosis of the left ICA, flow limiting stenosis of b/l ECA. . She is neurologically without acute change and remains with LUE plegia. Continue close monitoring for neurologic deterioration, SBP goal 100-140mmhg in setting of recent stent placement, gradual normotension as well given right ICA stenosis, Continue high dose statin. MRI Brain w/o contrast showed acute right frontoparietal and temporal lobe infarcts. On 6/20 AM she was noted to have jerking movements of the left hand to LUE with subsequent fascial twitching and resolved with 2mg of IV Ativan, 1g of Keppra. VEEG with no epileptiform abnormalities or seizures recorded. Continue Keppra 1g BID. Physical therapy/OT with recommendations for AR, continue current rehab treatment. LUE splint ordered.     ANTITHROMBOTIC THERAPY: ASA/plavix as s/p stent. P2Y12 06/22 was 84 indicating optimal platelet inhibition.     PULMONARY: CXR(06/20/19) No focal consolidation. Patient presented with acute respiratory failure due to acute on chronic pulmonary edema/ systolic and diastolic CHF treated with BIPAP and IV lasix and has now improved .  Currently saturating well on nasal cannula. Advised by pulmonary to start on CPAP for NIMESH. Will recommend outpatient sleep study. Duoneb prn, Bevespi BID    CARDIOVASCULAR: TTE : EF 30-35%, severely dilated left atrium, severe diastolic dysfunction, no PFO. Dr Suma quinones (Cardiology) appreciated, noted preivously with bradycardia and hypertension, cardiac cath possibly as per cardiology recommendations      SBP goal: -140    GASTROINTESTINAL:  dysphagia screen passed, tolerating diet     Diet: Regular    RENAL: BUN/Cr without acute change, maintain adequate hydration as tolerated      Na Goal: Greater than 135     Nichole: No    HEMATOLOGY: H/H without acute change No signs of bleeding noted.      DVT ppx: lovenox    ID: afebrile, no signs of infection.     OTHER: Plan endorsed to patient and her family at bedside, all questions and concerns addressed. Current smoker- smoking cessation and education provided. Nicotine patch       DISPOSITION: AR      CORE MEASURES:        Admission NIHSS: 6     TPA: NO      LDL/HDL: 71/27     Depression Screen: 0     Statin Therapy: yes     Dysphagia Screen: PASS     Smoking YES     Afib  NO     Stroke Education YES    Obtain screening lower extremity venous ultrasound in patients who meet 1 or more of the following criteria as patient is high risk for DVT/PE on admission:   [] History of DVT/PE  []Hypercoagulable states (Factor V Leiden, Cancer, OCP, etc. )  []Prolonged immobility (hemiplegia/hemiparesis/post operative or any other extended immobilization)  [] Transferred from outside facility (Rehab or Long term care)  [] Age </= to 50

## 2019-06-25 NOTE — PROGRESS NOTE ADULT - ASSESSMENT
52y/o F RH  PMH Anxiety, 40 pk/yr smoking hx(still active), does not get regular checkups presented to [facility] w/ L arm numbness, spasms starting 2000 6/19. LKW 6/19 2000. CT/CTA H/N done w/ severe stenosis at the origin of the right internal carotid artery with filling defect in the anterior cavernous segment suspicious for critical stenosis/focal occlusion as well as moderate to severe stenosis of the proximal cervical segment and mild stenosis of the posterior cavernous segment of the left internal carotid artery.   ·	Patient progressing well. She has moderate severe LV dysfunction. Volume status seems stable. Patient without orthopnea, no obvious rales on exam. JVP not significantly elevated although admittedly exam is difficult. Monitor strict I and Os for now. Holding off diuretics.   ·	ACE started. Titrate as needed for NSX bp goals.   ·	Ideally given LV dysfunction patient should be on beta blocker. She has transient bradycardia at night. Hopefully this improves with CPAP but should not preclude beta blockade. Coreg 3.125 BID.  ·	Patient will need cardiac cath prior to discharge. Can discuss timing with neuro.    =======================================================================  Lito Moise MD [facility]    Please page [phone] with questions  On nights and weekend please call the office [phone].  For all Cardiology service contact information, go to [email or web address] and use "[email or web address]" to login.

## 2019-06-25 NOTE — PROGRESS NOTE ADULT - SUBJECTIVE AND OBJECTIVE BOX
Follow-up Pulm Progress Note    No new respiratory events overnight.  Slept with CPAP for several hours but became claustrophobic and had to switch to nasal mask   99% on RA    Medications:  MEDICATIONS  (STANDING):  ALBUTerol/ipratropium for Nebulization 3 milliLiter(s) Nebulizer every 6 hours  aspirin 325 milliGRAM(s) Oral daily  atorvastatin 80 milliGRAM(s) Oral at bedtime  buDESOnide   0.25 milliGRAM(s) Respule 0.25 milliGRAM(s) Inhalation every 12 hours  chlorhexidine 4% Liquid 1 Application(s) Topical <User Schedule>  clopidogrel Tablet 75 milliGRAM(s) Oral daily  enoxaparin Injectable 30 milliGRAM(s) SubCutaneous <User Schedule>  levETIRAcetam 1000 milliGRAM(s) Oral two times a day  lisinopril 5 milliGRAM(s) Oral daily  nicotine - 21 mG/24Hr(s) Patch 1 patch Transdermal daily    MEDICATIONS  (PRN):  acetaminophen   Tablet .. 650 milliGRAM(s) Oral every 6 hours PRN Temp greater or equal to 38C (100.4F), Mild Pain (1 - 3), Moderate Pain (4 - 6), Severe Pain (7 - 10)  hydrALAZINE Injectable 5 milliGRAM(s) IV Push every 3 hours PRN SBP>140          Vital Signs Last 24 Hrs  T(C): 36.8 (25 Jun 2019 11:00), Max: 37 (25 Jun 2019 00:00)  T(F): 98.2 (25 Jun 2019 11:00), Max: 98.6 (25 Jun 2019 00:00)  HR: 93 (25 Jun 2019 11:48) (58 - 93)  BP: 142/79 (25 Jun 2019 11:00) (126/69 - 171/96)  BP(mean): 97 (25 Jun 2019 11:00) (85 - 116)  RR: 13 (25 Jun 2019 11:00) (11 - 18)  SpO2: 100% (25 Jun 2019 11:48) (96% - 100%) on RA          06-24 @ 07:01  -  06-25 @ 07:00  --------------------------------------------------------  IN: 1475 mL / OUT: 4050 mL / NET: -2575 mL          LABS:                        13.6   9.7   )-----------( 280      ( 23 Jun 2019 22:49 )             43.6     06-25    141  |  104  |  14  ----------------------------<  100<H>  4.1   |  26  |  0.78    Ca    9.4      25 Jun 2019 05:37  Phos  4.1     06-25  Mg     1.6     06-25            CAPILLARY BLOOD GLUCOSE                            CULTURES: (if applicable)  Culture Results:   No growth to date. (06-20 @ 22:03)  Culture Results:   No growth to date. (06-20 @ 22:03)    Most recent blood culture -- 06-20 @ 22:03   -- -- .Blood 06-20 @ 22:03        Physical Examination:  PULM: Clear to auscultation bilaterally, no significant sputum production  CVS: S1, S2 heard    RADIOLOGY REVIEWED  CXR: Low lung volumes, grossly clear

## 2019-06-25 NOTE — CONSULT NOTE ADULT - SUBJECTIVE AND OBJECTIVE BOX
History of Present Illness:  53y [id number] F RH  PMH Anxiety, 40 pk/yr smoking hx(still active), does not get regular checkups presented to [facility] with left arm numbness, spasms starting 20:00 on . A phone consult was performed with [facility] and during that time she had some worsening of her neurological status but left arm weakness and spasms. At that time she was given Ativan for possible seizure. CT noncontrast and CT angiogram of head and neck revealed right frontal hypodensity suggestive of right MCA infarction in the setting of bilateral severe carotid stenosis. She was then transferred to [facility], on exam NIHSS was 6, alert, awake however unable to move her left arm. She was also noticed to have continuous twitching of left arm and face, drop in oxygen saturation for which she was put on BiPAP.        Past Medical History  Anxiety  No pertinent past medical history      Past Surgical History  No significant past surgical history      MEDICATIONS  (STANDING):  aspirin 325 milliGRAM(s) Oral daily  atorvastatin 80 milliGRAM(s) Oral at bedtime  chlorhexidine 4% Liquid 1 Application(s) Topical <User Schedule>  clopidogrel Tablet 75 milliGRAM(s) Oral daily  enoxaparin Injectable 30 milliGRAM(s) SubCutaneous <User Schedule>  glycopyrrolate 9 MICROgram(s)/formoterol 4.8 MICROgram(s)Inhaler 2 Puff(s) Inhalation two times a day  levETIRAcetam 1000 milliGRAM(s) Oral two times a day  lisinopril 5 milliGRAM(s) Oral daily  nicotine - 21 mG/24Hr(s) Patch 1 patch Transdermal daily    MEDICATIONS  (PRN):  acetaminophen   Tablet .. 650 milliGRAM(s) Oral every 6 hours PRN Temp greater or equal to 38C (100.4F), Mild Pain (1 - 3), Moderate Pain (4 - 6), Severe Pain (7 - 10)  ALBUTerol    90 MICROgram(s) HFA Inhaler 2 Puff(s) Inhalation every 6 hours PRN Shortness of Breath and/or Wheezing    Antiplatelet therapy:        Plavix 75  mg  daily  ***Will need for 2 months CVA as per neuro                     Last dose/amt: 75  mg     Allergies: No Known Allergies      SOCIAL HISTORY:  Smoker: [x ] Yes  [ ] No        PACK YEARS:     37                    WHEN QUIT? current use  ETOH use: [ ] Yes  [x ] No              FREQUENCY / QUANTITY:  Ilicit Drug use:  [ ] Yes  [x ] No  Occupation: unemployed  Live with: spouse      Relevant Family History  FAMILY HISTORY: Father  age 35 Sudden cardiac event      Review of Systems  GENERAL:  Fevers[] chills[] sweats[] fatigue[] weight loss[] weight gain []                                        NEURO:  parathesias[x] seizures x[]  syncope []  confusion []                                                                                  EYES: glasses[x]  blurry vision[]  discharge[] pain[] glaucoma []                                                                            ENMT:  difficulty hearing []  vertigo[]  dysphagia[] epistaxis[] recent dental work []                                      CV:  chest pain[] palpitations[] GILMORE [] diaphoresis [] edema[]                                                                                             RESPIRATORY:  wheezing[] SOB[] cough [] sputum[] hemoptysis[]                                                                    GI:  nausea[]  vomiting []  diarrhea[] constipation [] melena []                                                                        : hematuria[ ]  dysuria[ ] urgency[] incontinence[]                                                                                              MUSKULOSKELETAL:  arthritis[ ]  joint swelling [ ] muscle weakness [x ]                                                                  SKIN/BREAST:  rash[ ] itching [ ]  hair loss[ ] masses[ ]                                                                                                PSYCH:  dementia [ ] depresion [ ] anxiety[ ]                                                                                                                  HEME/LYMPH:  bruises easily[ ] enlarged lymph nodes[ ] tender lymph nodes[ ]                                                 ENDOCRINE:  cold intolerance[ ] heat intolerance[ ] polydipsia[ ]                                                                              PHYSICAL EXAM  Vital Signs Last 24 Hrs  T(C): 37.1 (2019 15:45), Max: 37.1 (2019 15:45)  T(F): 98.7 (2019 15:45), Max: 98.7 (2019 15:45)  HR: 74 (2019 18:19) (58 - 93)  BP: 157/86 (2019 18:15) (126/69 - 174/94)  BP(mean): 106 (2019 18:15) (85 - 139)  RR: 19 (2019 18:15) (12 - 28)  SpO2: 98% (2019 18:19) (96% - 100%)    General: Well nourished, well developed, no acute distress.                                                         Neuro: Normal exam oriented to person/place & time with no focal motor or sensory  deficits.                    Eyes: Normal exam of conjunctiva & lids, pupils equally reactive.   ENT: Normal exam of nasal/oral mucosa with absence of cyanosis.   Neck: Normal exam of jugular veins, trachea & thyroid.   Chest: Normal lung exam with good air movement absence of wheezes, rales, or rhonchi:                                                                          CV:  Auscultation: normal [x ] S3[ ] S4[ ] Irregular [ ] Rub[ ] Clicks[ ]  Murmurs none:[x ]systolic [ ]  diastolic [ ] holosystolic [ ]  Carotids: No Bruits[x ] Other____________ Abdominal Aorta: normal [ ] nonpalpable[ ]                                                                         GI: Normal exam of abdomen, liver & spleen with no noted masses or tenderness.                                                                                              Extremities: Normal no evidence of cyanosis or deformity Edema: none[ ]trace[x ]1+[ ]2+[ ]3+[ ]4+[ ]  LUE flaccid, LLE weak but moves on command  Lower Extremity Pulses: Right[x ] Left[x ]Varicosities[ ]  SKIN : Normal exam to inspection & palpation.                                                           LABS:                        13.6   9.7   )-----------( 280      ( 2019 22:49 )             43.6     06-25    141  |  104  |  14  ----------------------------<  100<H>  4.1   |  26  |  0.78    Ca    9.4      2019 05:37  Phos  4.1     06-25  Mg     1.6     06-25                  Cardiac Cath:    Reported 3 VCAD    TTE / KEAGAN:  < from: Transthoracic Echocardiogram (19 @ 09:59) >  1. Tethered mitral valve leaflets. Mild mitral  regurgitation.  Peak mitral valve gradient equals 10 mm Hg,  mean transmitral valve gradient equals 5 mm Hg, consistent  with mild to moderate mitral stenosis. ()  2. Aortic valve not well visualized; probably normal.  3. Severely dilated left atrium.  LA volume index = 50  cc/m2.  4. Increased relative wall thickness with normal left  ventricular mass index, consistent with concentric left  ventricular remodeling.  5. Moderate to severe global left ventricular systolic  dysfunction.  6. Severe diastolic dysfunction (Stage III).  7. Right ventricular enlargement with decreased right  ventricular systolic function.  8. Estimated pulmonary artery systolic pressure equals 69  mm Hg, assuming right atrial pressure equals 8 mm Hg,  consistent with severe pulmonary pressures.  9. Agitatedsaline injection demonstrates no evidence of a  patent foramen ovale.    CT: CT HEAD: Acute right middle cerebral artery infarct involving the right   precentral gyrus and centrum semiovale. ASPECTS score 8 out of 10. No   hemorrhage or midline shift.     CTA HEAD AND NECK:   -Severe stenosis at the origin of the right internal carotid artery with   filling defect in the anterior cavernous segment suspicious for critical   stenosis/focal occlusion.    -Moderate to severe stenosis of the proximal cervical segment and mild   stenosis of the posterior cavernous segment of the left internal carotid   artery.      Assessment:  53y Female presents with Stroke      Plan:    Neuro rehab on discharge  As per Neuro will require 2 months antiplatelet therapy  Smoking cessation reinforced  ASA statin betablocker  CABG as outpt after rehab completed  Pt/Family to call for CTS appt after discharge> verbalizes understanding History of Present Illness:  53y [id number] F RH  PMH Anxiety, 40 pk/yr smoking hx(still active), does not get regular checkups presented to [facility] with left arm numbness, spasms starting 20:00 on . A phone consult was performed with [facility] and during that time she had some worsening of her neurological status but left arm weakness and spasms. At that time she was given Ativan for possible seizure. CT noncontrast and CT angiogram of head and neck revealed right frontal hypodensity suggestive of right MCA infarction in the setting of bilateral severe carotid stenosis. She was then transferred to [facility], on exam NIHSS was 6,   Right MCA artery ischemic infarct in the setting of large artery atherosclerosis - infarct pattern suggestive of watershed MCA -PCA and CORBIN-MCA and MCA embolic appearing infarct in the setting of Right carotid severe stenosis s/p SANDIE stent place on 19 Cardiology workup including cath revealed 3 VCAD> referred for CTS evaluation      Past Medical History  Anxiety  No pertinent past medical history      Past Surgical History  No significant past surgical history      MEDICATIONS  (STANDING):  aspirin 325 milliGRAM(s) Oral daily  atorvastatin 80 milliGRAM(s) Oral at bedtime  chlorhexidine 4% Liquid 1 Application(s) Topical <User Schedule>  clopidogrel Tablet 75 milliGRAM(s) Oral daily  enoxaparin Injectable 30 milliGRAM(s) SubCutaneous <User Schedule>  glycopyrrolate 9 MICROgram(s)/formoterol 4.8 MICROgram(s)Inhaler 2 Puff(s) Inhalation two times a day  levETIRAcetam 1000 milliGRAM(s) Oral two times a day  lisinopril 5 milliGRAM(s) Oral daily  nicotine - 21 mG/24Hr(s) Patch 1 patch Transdermal daily    MEDICATIONS  (PRN):  acetaminophen   Tablet .. 650 milliGRAM(s) Oral every 6 hours PRN Temp greater or equal to 38C (100.4F), Mild Pain (1 - 3), Moderate Pain (4 - 6), Severe Pain (7 - 10)  ALBUTerol    90 MICROgram(s) HFA Inhaler 2 Puff(s) Inhalation every 6 hours PRN Shortness of Breath and/or Wheezing    Antiplatelet therapy:        Plavix 75  mg  daily  ***Will need for 2 months CVA as per neuro                     Last dose/amt: 75  mg     Allergies: No Known Allergies      SOCIAL HISTORY:  Smoker: [x ] Yes  [ ] No        PACK YEARS:     37                    WHEN QUIT? current use  ETOH use: [ ] Yes  [x ] No              FREQUENCY / QUANTITY:  Ilicit Drug use:  [ ] Yes  [x ] No  Occupation: unemployed  Live with: spouse      Relevant Family History  FAMILY HISTORY: Father  age 35 Sudden cardiac event      Review of Systems  GENERAL:  Fevers[] chills[] sweats[] fatigue[] weight loss[] weight gain []                                        NEURO:  parathesias[x] seizures x[]  syncope []  confusion []                                                                                  EYES: glasses[x]  blurry vision[]  discharge[] pain[] glaucoma []                                                                            ENMT:  difficulty hearing []  vertigo[]  dysphagia[] epistaxis[] recent dental work []                                      CV:  chest pain[] palpitations[] GILMORE [] diaphoresis [] edema[]                                                                                             RESPIRATORY:  wheezing[] SOB[] cough [] sputum[] hemoptysis[]                                                                    GI:  nausea[]  vomiting []  diarrhea[] constipation [] melena []                                                                        : hematuria[ ]  dysuria[ ] urgency[] incontinence[]                                                                                              MUSKULOSKELETAL:  arthritis[ ]  joint swelling [ ] muscle weakness [x ]                                                                  SKIN/BREAST:  rash[ ] itching [ ]  hair loss[ ] masses[ ]                                                                                                PSYCH:  dementia [ ] depresion [ ] anxiety[ ]                                                                                                                  HEME/LYMPH:  bruises easily[ ] enlarged lymph nodes[ ] tender lymph nodes[ ]                                                 ENDOCRINE:  cold intolerance[ ] heat intolerance[ ] polydipsia[ ]                                                                              PHYSICAL EXAM  Vital Signs Last 24 Hrs  T(C): 37.1 (2019 15:45), Max: 37.1 (2019 15:45)  T(F): 98.7 (2019 15:45), Max: 98.7 (2019 15:45)  HR: 74 (2019 18:19) (58 - 93)  BP: 157/86 (2019 18:15) (126/69 - 174/94)  BP(mean): 106 (2019 18:15) (85 - 139)  RR: 19 (2019 18:15) (12 - 28)  SpO2: 98% (2019 18:19) (96% - 100%)    General: Well nourished, well developed, no acute distress.                                                         Neuro: Normal exam oriented to person/place & time with no focal motor or sensory  deficits.                    Eyes: Normal exam of conjunctiva & lids, pupils equally reactive.   ENT: Normal exam of nasal/oral mucosa with absence of cyanosis.   Neck: Normal exam of jugular veins, trachea & thyroid.   Chest: Normal lung exam with good air movement absence of wheezes, rales, or rhonchi:                                                                          CV:  Auscultation: normal [x ] S3[ ] S4[ ] Irregular [ ] Rub[ ] Clicks[ ]  Murmurs none:[x ]systolic [ ]  diastolic [ ] holosystolic [ ]  Carotids: No Bruits[x ] Other____________ Abdominal Aorta: normal [ ] nonpalpable[ ]                                                                         GI: Normal exam of abdomen, liver & spleen with no noted masses or tenderness.                                                                                              Extremities: Normal no evidence of cyanosis or deformity Edema: none[ ]trace[x ]1+[ ]2+[ ]3+[ ]4+[ ]  LUE flaccid, LLE weak but moves on command  Lower Extremity Pulses: Right[x ] Left[x ]Varicosities[ ]  SKIN : Normal exam to inspection & palpation.                                                           LABS:                        13.6   9.7   )-----------( 280      ( 2019 22:49 )             43.6     06-25    141  |  104  |  14  ----------------------------<  100<H>  4.1   |  26  |  0.78    Ca    9.4      2019 05:37  Phos  4.1     06-25  Mg     1.6     06-25                  Cardiac Cath:    Reported 3 VCAD    TTE / KEAGAN:  < from: Transthoracic Echocardiogram (19 @ 09:59) >  1. Tethered mitral valve leaflets. Mild mitral  regurgitation.  Peak mitral valve gradient equals 10 mm Hg,  mean transmitral valve gradient equals 5 mm Hg, consistent  with mild to moderate mitral stenosis. ()  2. Aortic valve not well visualized; probably normal.  3. Severely dilated left atrium.  LA volume index = 50  cc/m2.  4. Increased relative wall thickness with normal left  ventricular mass index, consistent with concentric left  ventricular remodeling.  5. Moderate to severe global left ventricular systolic  dysfunction.  6. Severe diastolic dysfunction (Stage III).  7. Right ventricular enlargement with decreased right  ventricular systolic function.  8. Estimated pulmonary artery systolic pressure equals 69  mm Hg, assuming right atrial pressure equals 8 mm Hg,  consistent with severe pulmonary pressures.  9. Agitatedsaline injection demonstrates no evidence of a  patent foramen ovale.    CT: CT HEAD: Acute right middle cerebral artery infarct involving the right   precentral gyrus and centrum semiovale. ASPECTS score 8 out of 10. No   hemorrhage or midline shift.     CTA HEAD AND NECK:   -Severe stenosis at the origin of the right internal carotid artery with   filling defect in the anterior cavernous segment suspicious for critical   stenosis/focal occlusion.    -Moderate to severe stenosis of the proximal cervical segment and mild   stenosis of the posterior cavernous segment of the left internal carotid   artery.      Assessment:  53y Female Right MCA artery ischemic infarct in the setting of large artery atherosclerosis - infarct pattern suggestive of watershed MCA -PCA and CORBIN-MCA and MCA embolic appearing infarct in the setting of Right carotid severe stenosis now S/P C with 3 VCAD, referred for CTS evaluation      Plan:    Neuro acute rehab on discharge  As per Neuro will require 2 months antiplatelet therapy  Smoking cessation reinforced  ASA statin betablocker  CABG as outpt after rehab completed  Keppra antiseizure Rx  Pt/Family to call for CTS appt after discharge> verbalizes understanding

## 2019-06-25 NOTE — PROGRESS NOTE ADULT - ASSESSMENT
52 y/o F with PMH of anxiety, current smoker (35 pack yrs) presents with L arm numbness found to have R MCA CVA s/p R ICA stent 6/21 and severe R carotid artery stenosis. Called to eval for nocturnal hypoxia and concern for underlying COPD. Patient states she is not currently dyspneic, does endorse snoring at night,  has witnessed periods of apnea. TTE with severe diastolic dysfunction, global LV dysfunction (EF 35%), severe pHTN with RV systolic dysfunction

## 2019-06-25 NOTE — DIETITIAN INITIAL EVALUATION ADULT. - ADD RECOMMEND
Consider low sodium diet in context of CHF. RD to remain available, honor dietary preferences as able. Provide diet/wt mangement education PRN. Will follow up per department protocol. Monitor PO intake, wt trends, nutrition related labs, skin integrity, hydration status, bowel regularity.

## 2019-06-26 LAB
ANION GAP SERPL CALC-SCNC: 15 MMOL/L — SIGNIFICANT CHANGE UP (ref 5–17)
BUN SERPL-MCNC: 14 MG/DL — SIGNIFICANT CHANGE UP (ref 7–23)
CALCIUM SERPL-MCNC: 9.3 MG/DL — SIGNIFICANT CHANGE UP (ref 8.4–10.5)
CHLORIDE SERPL-SCNC: 100 MMOL/L — SIGNIFICANT CHANGE UP (ref 96–108)
CO2 SERPL-SCNC: 24 MMOL/L — SIGNIFICANT CHANGE UP (ref 22–31)
CREAT SERPL-MCNC: 0.92 MG/DL — SIGNIFICANT CHANGE UP (ref 0.5–1.3)
GLUCOSE SERPL-MCNC: 97 MG/DL — SIGNIFICANT CHANGE UP (ref 70–99)
HCT VFR BLD CALC: 45.3 % — HIGH (ref 34.5–45)
HGB BLD-MCNC: 14 G/DL — SIGNIFICANT CHANGE UP (ref 11.5–15.5)
MCHC RBC-ENTMCNC: 23.5 PG — LOW (ref 27–34)
MCHC RBC-ENTMCNC: 30.9 GM/DL — LOW (ref 32–36)
MCV RBC AUTO: 75.8 FL — LOW (ref 80–100)
PLATELET # BLD AUTO: 313 K/UL — SIGNIFICANT CHANGE UP (ref 150–400)
POTASSIUM SERPL-MCNC: 4.2 MMOL/L — SIGNIFICANT CHANGE UP (ref 3.5–5.3)
POTASSIUM SERPL-SCNC: 4.2 MMOL/L — SIGNIFICANT CHANGE UP (ref 3.5–5.3)
RBC # BLD: 5.98 M/UL — HIGH (ref 3.8–5.2)
RBC # FLD: 17.4 % — HIGH (ref 10.3–14.5)
SODIUM SERPL-SCNC: 139 MMOL/L — SIGNIFICANT CHANGE UP (ref 135–145)
WBC # BLD: 8.4 K/UL — SIGNIFICANT CHANGE UP (ref 3.8–10.5)
WBC # FLD AUTO: 8.4 K/UL — SIGNIFICANT CHANGE UP (ref 3.8–10.5)

## 2019-06-26 PROCEDURE — 99233 SBSQ HOSP IP/OBS HIGH 50: CPT

## 2019-06-26 PROCEDURE — 99223 1ST HOSP IP/OBS HIGH 75: CPT

## 2019-06-26 PROCEDURE — 99232 SBSQ HOSP IP/OBS MODERATE 35: CPT

## 2019-06-26 RX ORDER — LISINOPRIL 2.5 MG/1
10 TABLET ORAL DAILY
Refills: 0 | Status: DISCONTINUED | OUTPATIENT
Start: 2019-06-26 | End: 2019-06-27

## 2019-06-26 RX ORDER — ALPRAZOLAM 0.25 MG
0.5 TABLET ORAL DAILY
Refills: 0 | Status: DISCONTINUED | OUTPATIENT
Start: 2019-06-26 | End: 2019-06-27

## 2019-06-26 RX ORDER — LISINOPRIL 2.5 MG/1
5 TABLET ORAL ONCE
Refills: 0 | Status: COMPLETED | OUTPATIENT
Start: 2019-06-26 | End: 2019-06-26

## 2019-06-26 RX ORDER — LISINOPRIL 2.5 MG/1
1 TABLET ORAL
Qty: 0 | Refills: 0 | DISCHARGE
Start: 2019-06-26

## 2019-06-26 RX ORDER — ALPRAZOLAM 0.25 MG
1 TABLET ORAL
Qty: 0 | Refills: 0 | DISCHARGE
Start: 2019-06-26

## 2019-06-26 RX ORDER — ALPRAZOLAM 0.25 MG
0.25 TABLET ORAL DAILY
Refills: 0 | Status: DISCONTINUED | OUTPATIENT
Start: 2019-06-26 | End: 2019-06-26

## 2019-06-26 RX ORDER — ALBUTEROL 90 UG/1
2 AEROSOL, METERED ORAL
Qty: 0 | Refills: 0 | DISCHARGE
Start: 2019-06-26

## 2019-06-26 RX ORDER — GLYCOPYRROLATE AND FORMOTEROL FUMARATE 9; 4.8 UG/1; UG/1
2 AEROSOL, METERED RESPIRATORY (INHALATION)
Qty: 0 | Refills: 0 | DISCHARGE
Start: 2019-06-26

## 2019-06-26 RX ADMIN — ENOXAPARIN SODIUM 30 MILLIGRAM(S): 100 INJECTION SUBCUTANEOUS at 06:07

## 2019-06-26 RX ADMIN — Medication 1 PATCH: at 19:40

## 2019-06-26 RX ADMIN — LEVETIRACETAM 1000 MILLIGRAM(S): 250 TABLET, FILM COATED ORAL at 17:24

## 2019-06-26 RX ADMIN — ATORVASTATIN CALCIUM 80 MILLIGRAM(S): 80 TABLET, FILM COATED ORAL at 22:34

## 2019-06-26 RX ADMIN — GLYCOPYRROLATE AND FORMOTEROL FUMARATE 2 PUFF(S): 9; 4.8 AEROSOL, METERED RESPIRATORY (INHALATION) at 17:25

## 2019-06-26 RX ADMIN — ENOXAPARIN SODIUM 30 MILLIGRAM(S): 100 INJECTION SUBCUTANEOUS at 17:24

## 2019-06-26 RX ADMIN — Medication 0.5 MILLIGRAM(S): at 11:19

## 2019-06-26 RX ADMIN — Medication 1 PATCH: at 08:34

## 2019-06-26 RX ADMIN — CLOPIDOGREL BISULFATE 75 MILLIGRAM(S): 75 TABLET, FILM COATED ORAL at 11:18

## 2019-06-26 RX ADMIN — LISINOPRIL 5 MILLIGRAM(S): 2.5 TABLET ORAL at 18:22

## 2019-06-26 RX ADMIN — Medication 1 PATCH: at 11:22

## 2019-06-26 RX ADMIN — GLYCOPYRROLATE AND FORMOTEROL FUMARATE 2 PUFF(S): 9; 4.8 AEROSOL, METERED RESPIRATORY (INHALATION) at 06:08

## 2019-06-26 RX ADMIN — Medication 325 MILLIGRAM(S): at 11:13

## 2019-06-26 RX ADMIN — LISINOPRIL 5 MILLIGRAM(S): 2.5 TABLET ORAL at 06:08

## 2019-06-26 RX ADMIN — LEVETIRACETAM 1000 MILLIGRAM(S): 250 TABLET, FILM COATED ORAL at 06:08

## 2019-06-26 RX ADMIN — Medication 1 PATCH: at 11:18

## 2019-06-26 NOTE — PROGRESS NOTE ADULT - SUBJECTIVE AND OBJECTIVE BOX
Follow-up Pulm Progress Note    No new respiratory events overnight.  Denies SOB/CP.   97% on RA  Tolerated nasal CPAP overnight     Medications:  MEDICATIONS  (STANDING):  ALPRAZolam 0.5 milliGRAM(s) Oral daily  aspirin 325 milliGRAM(s) Oral daily  atorvastatin 80 milliGRAM(s) Oral at bedtime  chlorhexidine 4% Liquid 1 Application(s) Topical <User Schedule>  clopidogrel Tablet 75 milliGRAM(s) Oral daily  enoxaparin Injectable 30 milliGRAM(s) SubCutaneous <User Schedule>  glycopyrrolate 9 MICROgram(s)/formoterol 4.8 MICROgram(s)Inhaler 2 Puff(s) Inhalation two times a day  levETIRAcetam 1000 milliGRAM(s) Oral two times a day  lisinopril 5 milliGRAM(s) Oral daily  nicotine - 21 mG/24Hr(s) Patch 1 patch Transdermal daily    MEDICATIONS  (PRN):  acetaminophen   Tablet .. 650 milliGRAM(s) Oral every 6 hours PRN Temp greater or equal to 38C (100.4F), Mild Pain (1 - 3), Moderate Pain (4 - 6), Severe Pain (7 - 10)  ALBUTerol    90 MICROgram(s) HFA Inhaler 2 Puff(s) Inhalation every 6 hours PRN Shortness of Breath and/or Wheezing          Vital Signs Last 24 Hrs  T(C): 36.7 (26 Jun 2019 07:36), Max: 37.1 (25 Jun 2019 15:45)  T(F): 98.1 (26 Jun 2019 07:36), Max: 98.7 (25 Jun 2019 15:45)  HR: 77 (26 Jun 2019 12:00) (59 - 84)  BP: 153/75 (26 Jun 2019 12:00) (105/57 - 174/94)  BP(mean): 96 (26 Jun 2019 12:00) (71 - 151)  RR: 14 (26 Jun 2019 12:00) (12 - 28)  SpO2: 98% (26 Jun 2019 12:00) (95% - 100%) on RA          06-25 @ 07:01  -  06-26 @ 07:00  --------------------------------------------------------  IN: 240 mL / OUT: 2300 mL / NET: -2060 mL          LABS:                        14.0   8.4   )-----------( 313      ( 26 Jun 2019 03:24 )             45.3     06-26    139  |  100  |  14  ----------------------------<  97  4.2   |  24  |  0.92    Ca    9.3      26 Jun 2019 03:24  Phos  4.1     06-25  Mg     1.6     06-25            CAPILLARY BLOOD GLUCOSE                            CULTURES: (if applicable)  Culture Results:   No growth at 5 days. (06-20 @ 22:03)  Culture Results:   No growth at 5 days. (06-20 @ 22:03)          Physical Examination:  PULM: Clear to auscultation bilaterally, no significant sputum production  CVS: S1, S2 heard    RADIOLOGY REVIEWED  CXR: Low lung volumes, grossly clear

## 2019-06-26 NOTE — PROGRESS NOTE ADULT - SUBJECTIVE AND OBJECTIVE BOX
Cardiology Follow Up  ==========================================  CC: 4 Elements or 3 chronics    HPI:    Review Of Systems:  General: No fevers  GI: No nausea, no abdominal pain  Other wise negative except as documented above      Medications:  acetaminophen   Tablet ..   650 milliGRAM(s) Oral (06-25-19 @ 20:44)    ALBUTerol/ipratropium for Nebulization   3 milliLiter(s) Nebulizer (06-25-19 @ 11:41)    aspirin   325 milliGRAM(s) Oral (06-25-19 @ 11:26)    atorvastatin   80 milliGRAM(s) Oral (06-25-19 @ 21:45)    clopidogrel Tablet   75 milliGRAM(s) Oral (06-25-19 @ 11:26)    enoxaparin Injectable   30 milliGRAM(s) SubCutaneous (06-26-19 @ 06:07)    glycopyrrolate 9 MICROgram(s)/formoterol 4.8 MICROgram(s)Inhaler   2 Puff(s) Inhalation (06-26-19 @ 06:08)   2 Puff(s) Inhalation (06-25-19 @ 18:17)    levETIRAcetam   1000 milliGRAM(s) Oral (06-26-19 @ 06:08)   1000 milliGRAM(s) Oral (06-25-19 @ 17:16)    lisinopril   5 milliGRAM(s) Oral (06-26-19 @ 06:08)    nicotine - 21 mG/24Hr(s) Patch   1 Patch Transdermal (06-25-19 @ 11:09)        Telemetry:     Vital Signs Last 24 Hrs  T(C): 36.7 (26 Jun 2019 07:36), Max: 37.1 (25 Jun 2019 15:45)  T(F): 98.1 (26 Jun 2019 07:36), Max: 98.7 (25 Jun 2019 15:45)  HR: 73 (26 Jun 2019 08:00) (59 - 93)  BP: 157/85 (26 Jun 2019 08:00) (105/57 - 174/94)  BP(mean): 106 (26 Jun 2019 08:00) (71 - 151)  RR: 13 (26 Jun 2019 08:00) (12 - 28)  SpO2: 98% (26 Jun 2019 08:00) (95% - 100%)  I&O's Summary    25 Jun 2019 07:01  -  26 Jun 2019 07:00  --------------------------------------------------------  IN: 240 mL / OUT: 2300 mL / NET: -2060 mL        Physical Exam:  General: [x ] NAD  Eyes: [x ] EOMI [ x] PERRL  HENT: [x] MMM  Neck: [x] No JVD  CV: [x ] S1,S2 [x ] RRR [x ] No M/R/G [x] Nl PMI         [x] No edema  Lungs: [x ] CTA B/L [ x] Normal effort  Abd: [x ] Soft [x ] Non-tender [x ] +BS  Ext: [x ] No cyanosis  Skin:[x] No rashes   Psych: [x] Mood and affect appropriate    Labs:                        14.0   8.4   )-----------( 313      ( 26 Jun 2019 03:24 )             45.3     06-26    139  |  100  |  14  ----------------------------<  97  4.2   |  24  |  0.92    Ca    9.3      26 Jun 2019 03:24  Phos  4.1     06-25  Mg     1.6     06-25 Cardiology Follow Up  ==========================================  CC: CAD, HFrEF, HTN    HPI: Multivessel CAD without chest pain. HFrEF which seems well compensated. HTN better controlled on present medications.     Review Of Systems:  General: No fevers  GI: No nausea, no abdominal pain  Other wise negative except as documented above      Medications:  acetaminophen   Tablet ..   650 milliGRAM(s) Oral (06-25-19 @ 20:44)    ALBUTerol/ipratropium for Nebulization   3 milliLiter(s) Nebulizer (06-25-19 @ 11:41)    aspirin   325 milliGRAM(s) Oral (06-25-19 @ 11:26)    atorvastatin   80 milliGRAM(s) Oral (06-25-19 @ 21:45)    clopidogrel Tablet   75 milliGRAM(s) Oral (06-25-19 @ 11:26)    enoxaparin Injectable   30 milliGRAM(s) SubCutaneous (06-26-19 @ 06:07)    glycopyrrolate 9 MICROgram(s)/formoterol 4.8 MICROgram(s)Inhaler   2 Puff(s) Inhalation (06-26-19 @ 06:08)   2 Puff(s) Inhalation (06-25-19 @ 18:17)    levETIRAcetam   1000 milliGRAM(s) Oral (06-26-19 @ 06:08)   1000 milliGRAM(s) Oral (06-25-19 @ 17:16)    lisinopril   5 milliGRAM(s) Oral (06-26-19 @ 06:08)    nicotine - 21 mG/24Hr(s) Patch   1 Patch Transdermal (06-25-19 @ 11:09)        Telemetry: No significant ectopy     Vital Signs Last 24 Hrs  T(C): 36.7 (26 Jun 2019 07:36), Max: 37.1 (25 Jun 2019 15:45)  T(F): 98.1 (26 Jun 2019 07:36), Max: 98.7 (25 Jun 2019 15:45)  HR: 73 (26 Jun 2019 08:00) (59 - 93)  BP: 157/85 (26 Jun 2019 08:00) (105/57 - 174/94)  BP(mean): 106 (26 Jun 2019 08:00) (71 - 151)  RR: 13 (26 Jun 2019 08:00) (12 - 28)  SpO2: 98% (26 Jun 2019 08:00) (95% - 100%)  I&O's Summary    25 Jun 2019 07:01  -  26 Jun 2019 07:00  --------------------------------------------------------  IN: 240 mL / OUT: 2300 mL / NET: -2060 mL        Physical Exam:  General: [x ] NAD  Eyes: [x ] EOMI [ x] PERRL  HENT: [x] MMM  Neck: [x] No JVD  CV: [x ] S1,S2 [x ] RRR [x ] No M/R/G [x] Nl PMI         [x] No edema  Lungs: [x ] CTA B/L [ x] Normal effort  Abd: [x ] Soft [x ] Non-tender [x ] +BS  Ext: [x ] No cyanosis  Skin:[x] No rashes   Psych: [x] Mood and affect appropriate    Labs:                        14.0   8.4   )-----------( 313      ( 26 Jun 2019 03:24 )             45.3     06-26    139  |  100  |  14  ----------------------------<  97  4.2   |  24  |  0.92    Ca    9.3      26 Jun 2019 03:24  Phos  4.1     06-25  Mg     1.6     06-25

## 2019-06-26 NOTE — PROGRESS NOTE ADULT - ASSESSMENT
52y/o F RH  PMH Anxiety, 40 pk/yr smoking hx(still active), does not get regular checkups presented to [facility] with left arm numbness, spasms starting 20:00 on 6/19. A phone consult was performed with [facility] and during that time she had some worsening of her neurological status but left arm weakness and spasms. At that time she was given Ativan for possible seizure. CT noncontrast and CT angiogram of head and neck revealed right frontal hypodensity suggestive of right MCA infarction in the setting of bilateral severe carotid stenosis. She was then transferred to [facility].     Impression:  #1 Right MCA artery ischemic infarct in the setting of large artery atherosclerosis - infarct pattern suggestive of watershed MCA -PCA and CORBIN-MCA and MCA embolic appearing infarct in the setting of Right carotid severe stenosis  #2 bilateral carotid extracranial stenosis    NEURO: She is s/p right internal carotid stent stent place on 6/21/19 with Dr. Barrios. Carotid dopplers performed: unimpeded antegrade flow thorugh the stented right ICA, severe, >70% stenosis of the left ICA, flow limiting stenosis of b/l ECA. . She is neurologically without acute change and remains with LUE plegia. Continue close monitoring for neurologic deterioration, SBP goal 100-140mmhg in setting of recent stent placement, gradual normotension as well given right ICA stenosis, Continue high dose statin. MRI Brain w/o contrast showed acute right frontoparietal and temporal lobe infarcts. On 6/20 AM she was noted to have jerking movements of the left hand to LUE with subsequent fascial twitching and resolved with 2mg of IV Ativan, 1g of Keppra. VEEG with no epileptiform abnormalities or seizures recorded. Continue Keppra 1g BID. Physical therapy/OT with recommendations for AR, continue current rehab treatment. LUE splint ordered.     ANTITHROMBOTIC THERAPY: ASA/plavix as s/p stent. P2Y12 06/22 was 84 indicating optimal platelet inhibition.     PULMONARY: CXR(06/20/19) No focal consolidation. Patient presented with acute respiratory failure due to acute on chronic pulmonary edema/ systolic and diastolic CHF treated with BIPAP and IV lasix and has now improved .  Currently saturating well on nasal cannula. Advised by pulmonary to start on CPAP for NIMESH. Will recommend outpatient sleep study. Duoneb prn, Bevespi BID    CARDIOVASCULAR: TTE : EF 30-35%, severely dilated left atrium, severe diastolic dysfunction, no PFO. Dr Suma quinones (Cardiology) appreciated, noted preivously with bradycardia and hypertension, s/p cardiac cath on 06/25/19: 3 VCAD, as per CT surgery eval: CABG as outpt. Discussed with Pt about AF study, she is refusing study at this time     SBP goal: -140    GASTROINTESTINAL:  dysphagia screen passed, tolerating diet     Diet: Regular    RENAL: BUN/Cr without acute change, maintain adequate hydration as tolerated      Na Goal: Greater than 135     Nichole: No    HEMATOLOGY: H/H without acute change No signs of bleeding noted.      DVT ppx: lovenox    ID: afebrile, no signs of infection.     OTHER: Plan endorsed to patient and her family at bedside, all questions and concerns addressed. Current smoker- smoking cessation and education provided. Nicotine patch       DISPOSITION: AR      CORE MEASURES:        Admission NIHSS: 6     TPA: NO      LDL/HDL: 71/27     Depression Screen: 0     Statin Therapy: yes     Dysphagia Screen: PASS     Smoking YES     Afib  NO     Stroke Education YES    Obtain screening lower extremity venous ultrasound in patients who meet 1 or more of the following criteria as patient is high risk for DVT/PE on admission:   [] History of DVT/PE  []Hypercoagulable states (Factor V Leiden, Cancer, OCP, etc. )  []Prolonged immobility (hemiplegia/hemiparesis/post operative or any other extended immobilization)  [] Transferred from outside facility (Rehab or Long term care)  [] Age </= to 50

## 2019-06-26 NOTE — PROGRESS NOTE ADULT - SUBJECTIVE AND OBJECTIVE BOX
THE PATIENT WAS SEEN AND EXAMINED BY ME WITH THE HOUSESTAFF AND STROKE TEAM DURING MORNING ROUNDS.   HPI:  52y/o F RH  PMH Anxiety, 40 pk/yr smoking hx(still active), does not get regular checkups presented to [facility] with left arm numbness, spasms starting 20:00 on 6/19. A phone consult was performed with [facility] and during that time she had some worsening of her neurological status but left arm weakness and spasms. At that time she was given Ativan for possible seizure. CT noncontrast and CT angiogram of head and neck revealed right frontal hypodensity suggestive of right MCA infarction in the setting of bilateral severe carotid stenosis. She was then transferred to [facility], on exam NIHSS was 6, alert, awake however unable to move her left arm. She was also noticed to have continuous twitching of left arm and face, drop in oxygen saturation for which she was put on BiPAP.    SUBJECTIVE: No events overnight.  No new neurologic complaints.      acetaminophen   Tablet .. 650 milliGRAM(s) Oral every 6 hours PRN  ALBUTerol    90 MICROgram(s) HFA Inhaler 2 Puff(s) Inhalation every 6 hours PRN  ALPRAZolam 0.5 milliGRAM(s) Oral daily  aspirin 325 milliGRAM(s) Oral daily  atorvastatin 80 milliGRAM(s) Oral at bedtime  chlorhexidine 4% Liquid 1 Application(s) Topical <User Schedule>  clopidogrel Tablet 75 milliGRAM(s) Oral daily  enoxaparin Injectable 30 milliGRAM(s) SubCutaneous <User Schedule>  glycopyrrolate 9 MICROgram(s)/formoterol 4.8 MICROgram(s)Inhaler 2 Puff(s) Inhalation two times a day  levETIRAcetam 1000 milliGRAM(s) Oral two times a day  lisinopril 5 milliGRAM(s) Oral daily  nicotine - 21 mG/24Hr(s) Patch 1 patch Transdermal daily      PHYSICAL EXAM:   Vital Signs Last 24 Hrs  T(C): 36.7 (26 Jun 2019 07:36), Max: 37.1 (25 Jun 2019 15:45)  T(F): 98.1 (26 Jun 2019 07:36), Max: 98.7 (25 Jun 2019 15:45)  HR: 77 (26 Jun 2019 12:00) (59 - 84)  BP: 153/75 (26 Jun 2019 12:00) (105/57 - 174/94)  BP(mean): 96 (26 Jun 2019 12:00) (71 - 151)  RR: 14 (26 Jun 2019 12:00) (12 - 28)  SpO2: 98% (26 Jun 2019 12:00) (95% - 100%)    General: No acute distress  HEENT: EOMI, visual fields full  Abdomen: Soft, nontender, nondistended   Extremities: Right groin:  No edema, pulses are presents    NEUROLOGICAL EXAM:  Mental status: Awake, alert, oriented x3, fluent speech, follows commands  Cranial Nerves: left facial droop, subtle left gaze palsy- EOMI, no clear gaze palsy, VFF, no nystagmus, no dysarthria,  tongue midline  Motor exam: RUE 5/5, RLE 5/5 LUE shoulder shrug is presents, distally 0/5, LLE drift 5/5  Sensation: Intact to light touch   Coordination/ Gait: No dysmetria, gait not tested        LABS:                        14.0   8.4   )-----------( 313      ( 26 Jun 2019 03:24 )             45.3    06-26    139  |  100  |  14  ----------------------------<  97  4.2   |  24  |  0.92    Ca    9.3      26 Jun 2019 03:24  Phos  4.1     06-25  Mg     1.6     06-25      Hemoglobin A1C, Whole Blood: 6.2 % (06-21 @ 11:12)      IMAGING: Reviewed by me.     VEEG (06/21/19) Normal EEG. There were no epileptiform abnormalities or seizures recorded.     MR Head No Cont (06.20.19) Acute right frontoparietal and temporal lobe infarcts. Possible subacute subcortical white matter infarct right frontal lobe. No acute intracranial hemorrhage. Recommend further imaging of the vascular system with MRAs of the brain and neck.    CT Head No Cont (06.20.19 11:45) No significant change since 6:56 AM. Subtle lucency in the right parietal region in a portion of the right middle cerebral artery distribution. No hemorrhage.    CT Head No Cont (06.20.19 07:25) No interval change in right middle cerebral artery infarct involving the right precentral gyrus and centrum semiovale. No hemorrhage. ASPECTS score 8 out of 10.    CT Head No Cont (06.20.19) Acute right middle cerebral artery infarct involving the right precentral gyrus and centrum semiovale. ASPECTS score 8 out of 10. No hemorrhage or midline shift.     CTA Head and Neck w/ IV Cont (06.20.19) Severe stenosis at the origin of the right internal carotid artery with filling defect in the anterior cavernous segment suspicious for critical stenosis/focal occlusion. Moderate to severe stenosis of the proximal cervical segment and mild stenosis of the posterior cavernous segment of the left internal carotid artery. THE PATIENT WAS SEEN AND EXAMINED BY ME WITH THE HOUSESTAFF AND STROKE TEAM DURING MORNING ROUNDS.   HPI:  54y/o F RH  PMH Anxiety, 40 pk/yr smoking hx(still active), does not get regular checkups presented to [facility] with left arm numbness, spasms starting 20:00 on 6/19. A phone consult was performed with [facility] and during that time she had some worsening of her neurological status but left arm weakness and spasms. At that time she was given Ativan for possible seizure. CT noncontrast and CT angiogram of head and neck revealed right frontal hypodensity suggestive of right MCA infarction in the setting of bilateral severe carotid stenosis. She was then transferred to [facility], on exam NIHSS was 6, alert, awake however unable to move her left arm. She was also noticed to have continuous twitching of left arm and face, drop in oxygen saturation for which she was put on BiPAP.    SUBJECTIVE: No events overnight.  No new neurologic complaints.      acetaminophen   Tablet .. 650 milliGRAM(s) Oral every 6 hours PRN  ALBUTerol    90 MICROgram(s) HFA Inhaler 2 Puff(s) Inhalation every 6 hours PRN  ALPRAZolam 0.5 milliGRAM(s) Oral daily  aspirin 325 milliGRAM(s) Oral daily  atorvastatin 80 milliGRAM(s) Oral at bedtime  chlorhexidine 4% Liquid 1 Application(s) Topical <User Schedule>  clopidogrel Tablet 75 milliGRAM(s) Oral daily  enoxaparin Injectable 30 milliGRAM(s) SubCutaneous <User Schedule>  glycopyrrolate 9 MICROgram(s)/formoterol 4.8 MICROgram(s)Inhaler 2 Puff(s) Inhalation two times a day  levETIRAcetam 1000 milliGRAM(s) Oral two times a day  lisinopril 5 milliGRAM(s) Oral daily  nicotine - 21 mG/24Hr(s) Patch 1 patch Transdermal daily      PHYSICAL EXAM:   Vital Signs Last 24 Hrs  T(C): 36.7 (26 Jun 2019 07:36), Max: 37.1 (25 Jun 2019 15:45)  T(F): 98.1 (26 Jun 2019 07:36), Max: 98.7 (25 Jun 2019 15:45)  HR: 77 (26 Jun 2019 12:00) (59 - 84)  BP: 153/75 (26 Jun 2019 12:00) (105/57 - 174/94)  BP(mean): 96 (26 Jun 2019 12:00) (71 - 151)  RR: 14 (26 Jun 2019 12:00) (12 - 28)  SpO2: 98% (26 Jun 2019 12:00) (95% - 100%)    General: No acute distress  HEENT: EOMI, visual fields full  Abdomen: Soft, nontender, nondistended   Extremities: Right groin:  No edema, pulses are presents    NEUROLOGICAL EXAM:  Mental status: Awake, alert, oriented x3, fluent speech, follows commands  Cranial Nerves: left facial droop, EOMI, no clear gaze palsy, VFF, no nystagmus, no dysarthria,  tongue midline  Motor exam: RUE 5/5, RLE 5/5 LUE shoulder shrug is presents, remaining L arm 0/5, LLE drift 5/5  Sensation: Intact to light touch   Coordination/ Gait: No dysmetria, gait not tested        LABS:                        14.0   8.4   )-----------( 313      ( 26 Jun 2019 03:24 )             45.3    06-26    139  |  100  |  14  ----------------------------<  97  4.2   |  24  |  0.92    Ca    9.3      26 Jun 2019 03:24  Phos  4.1     06-25  Mg     1.6     06-25      Hemoglobin A1C, Whole Blood: 6.2 % (06-21 @ 11:12)      IMAGING: Reviewed by me.     VEEG (06/21/19) Normal EEG. There were no epileptiform abnormalities or seizures recorded.     MR Head No Cont (06.20.19) Acute right frontoparietal and temporal lobe infarcts. Possible subacute subcortical white matter infarct right frontal lobe. No acute intracranial hemorrhage. Recommend further imaging of the vascular system with MRAs of the brain and neck.    CT Head No Cont (06.20.19 11:45) No significant change since 6:56 AM. Subtle lucency in the right parietal region in a portion of the right middle cerebral artery distribution. No hemorrhage.    CT Head No Cont (06.20.19 07:25) No interval change in right middle cerebral artery infarct involving the right precentral gyrus and centrum semiovale. No hemorrhage. ASPECTS score 8 out of 10.    CT Head No Cont (06.20.19) Acute right middle cerebral artery infarct involving the right precentral gyrus and centrum semiovale. ASPECTS score 8 out of 10. No hemorrhage or midline shift.     CTA Head and Neck w/ IV Cont (06.20.19) Severe stenosis at the origin of the right internal carotid artery with filling defect in the anterior cavernous segment suspicious for critical stenosis/focal occlusion. Moderate to severe stenosis of the proximal cervical segment and mild stenosis of the posterior cavernous segment of the left internal carotid artery.

## 2019-06-26 NOTE — PROGRESS NOTE ADULT - ASSESSMENT
54y/o F RH  PMH Anxiety, 40 pk/yr smoking hx(still active), does not get regular checkups presented to [facility] w/ L arm numbness, spasms starting 2000 6/19. LKW 6/19 2000. CT/CTA H/N done w/ severe stenosis at the origin of the right internal carotid artery with filling defect in the anterior cavernous segment suspicious for critical stenosis/focal occlusion as well as moderate to severe stenosis of the proximal cervical segment and mild stenosis of the posterior cavernous segment of the left internal carotid artery.   ·	Cardiac status is stable. No further bradycardia with CPAP. Would start Coreg 3.125 BID  ·	Continue other cardiac medications. ACE, statin.   ·	CTS consult appreciated. Plan for CABG when able to stop plavix. If any symptoms will plan for PCI.     =======================================================================  Lito Moise MD FAC    Please page [phone] with questions  On nights and weekend please call the office [phone].  For all Cardiology service contact information, go to [email or web address] and use "[email or web address]" to login.

## 2019-06-26 NOTE — PROGRESS NOTE ADULT - ASSESSMENT
52 y/o F with PMH of anxiety, current smoker (35 pack yrs) presents with L arm numbness found to have R MCA CVA s/p R ICA stent 6/21 and severe R carotid artery stenosis. Called to eval for nocturnal hypoxia and concern for underlying COPD. Patient states she is not currently dyspneic, does endorse snoring at night,  has witnessed periods of apnea. TTE with severe diastolic dysfunction, global LV dysfunction (EF 35%), severe pHTN with RV systolic dysfunction. Cardiac cath 6/25 with multi-vessel disease, plan for CABG in the future

## 2019-06-27 VITALS
OXYGEN SATURATION: 98 % | SYSTOLIC BLOOD PRESSURE: 157 MMHG | RESPIRATION RATE: 18 BRPM | DIASTOLIC BLOOD PRESSURE: 76 MMHG | HEART RATE: 87 BPM

## 2019-06-27 LAB
ANION GAP SERPL CALC-SCNC: 11 MMOL/L — SIGNIFICANT CHANGE UP (ref 5–17)
BUN SERPL-MCNC: 18 MG/DL — SIGNIFICANT CHANGE UP (ref 7–23)
CALCIUM SERPL-MCNC: 9 MG/DL — SIGNIFICANT CHANGE UP (ref 8.4–10.5)
CHLORIDE SERPL-SCNC: 102 MMOL/L — SIGNIFICANT CHANGE UP (ref 96–108)
CO2 SERPL-SCNC: 24 MMOL/L — SIGNIFICANT CHANGE UP (ref 22–31)
CREAT SERPL-MCNC: 0.87 MG/DL — SIGNIFICANT CHANGE UP (ref 0.5–1.3)
GLUCOSE SERPL-MCNC: 98 MG/DL — SIGNIFICANT CHANGE UP (ref 70–99)
HCT VFR BLD CALC: 49 % — HIGH (ref 34.5–45)
HGB BLD-MCNC: 14.4 G/DL — SIGNIFICANT CHANGE UP (ref 11.5–15.5)
MCHC RBC-ENTMCNC: 22.3 PG — LOW (ref 27–34)
MCHC RBC-ENTMCNC: 29.4 GM/DL — LOW (ref 32–36)
MCV RBC AUTO: 76 FL — LOW (ref 80–100)
PLATELET # BLD AUTO: 311 K/UL — SIGNIFICANT CHANGE UP (ref 150–400)
POTASSIUM SERPL-MCNC: 4 MMOL/L — SIGNIFICANT CHANGE UP (ref 3.5–5.3)
POTASSIUM SERPL-SCNC: 4 MMOL/L — SIGNIFICANT CHANGE UP (ref 3.5–5.3)
RBC # BLD: 6.44 M/UL — HIGH (ref 3.8–5.2)
RBC # FLD: 17.7 % — HIGH (ref 10.3–14.5)
SODIUM SERPL-SCNC: 137 MMOL/L — SIGNIFICANT CHANGE UP (ref 135–145)
WBC # BLD: 7.9 K/UL — SIGNIFICANT CHANGE UP (ref 3.8–10.5)
WBC # FLD AUTO: 7.9 K/UL — SIGNIFICANT CHANGE UP (ref 3.8–10.5)

## 2019-06-27 PROCEDURE — 93005 ELECTROCARDIOGRAM TRACING: CPT

## 2019-06-27 PROCEDURE — 85576 BLOOD PLATELET AGGREGATION: CPT

## 2019-06-27 PROCEDURE — 94640 AIRWAY INHALATION TREATMENT: CPT

## 2019-06-27 PROCEDURE — 97760 ORTHOTIC MGMT&TRAING 1ST ENC: CPT

## 2019-06-27 PROCEDURE — 95816 EEG AWAKE AND DROWSY: CPT

## 2019-06-27 PROCEDURE — G0515: CPT

## 2019-06-27 PROCEDURE — C1769: CPT

## 2019-06-27 PROCEDURE — 97166 OT EVAL MOD COMPLEX 45 MIN: CPT

## 2019-06-27 PROCEDURE — 83605 ASSAY OF LACTIC ACID: CPT

## 2019-06-27 PROCEDURE — 82803 BLOOD GASES ANY COMBINATION: CPT

## 2019-06-27 PROCEDURE — 81001 URINALYSIS AUTO W/SCOPE: CPT

## 2019-06-27 PROCEDURE — 87486 CHLMYD PNEUM DNA AMP PROBE: CPT

## 2019-06-27 PROCEDURE — 93458 L HRT ARTERY/VENTRICLE ANGIO: CPT

## 2019-06-27 PROCEDURE — 87798 DETECT AGENT NOS DNA AMP: CPT

## 2019-06-27 PROCEDURE — 80053 COMPREHEN METABOLIC PANEL: CPT

## 2019-06-27 PROCEDURE — 36226 PLACE CATH VERTEBRAL ART: CPT

## 2019-06-27 PROCEDURE — 86900 BLOOD TYPING SEROLOGIC ABO: CPT

## 2019-06-27 PROCEDURE — 99152 MOD SED SAME PHYS/QHP 5/>YRS: CPT

## 2019-06-27 PROCEDURE — 97162 PT EVAL MOD COMPLEX 30 MIN: CPT

## 2019-06-27 PROCEDURE — 93306 TTE W/DOPPLER COMPLETE: CPT

## 2019-06-27 PROCEDURE — 84484 ASSAY OF TROPONIN QUANT: CPT

## 2019-06-27 PROCEDURE — 97116 GAIT TRAINING THERAPY: CPT

## 2019-06-27 PROCEDURE — 82435 ASSAY OF BLOOD CHLORIDE: CPT

## 2019-06-27 PROCEDURE — 71045 X-RAY EXAM CHEST 1 VIEW: CPT

## 2019-06-27 PROCEDURE — 84443 ASSAY THYROID STIM HORMONE: CPT

## 2019-06-27 PROCEDURE — C1876: CPT

## 2019-06-27 PROCEDURE — 70551 MRI BRAIN STEM W/O DYE: CPT

## 2019-06-27 PROCEDURE — 36225 PLACE CATH SUBCLAVIAN ART: CPT | Mod: 59

## 2019-06-27 PROCEDURE — C1725: CPT

## 2019-06-27 PROCEDURE — 93970 EXTREMITY STUDY: CPT

## 2019-06-27 PROCEDURE — 83036 HEMOGLOBIN GLYCOSYLATED A1C: CPT

## 2019-06-27 PROCEDURE — 87633 RESP VIRUS 12-25 TARGETS: CPT

## 2019-06-27 PROCEDURE — 70450 CT HEAD/BRAIN W/O DYE: CPT

## 2019-06-27 PROCEDURE — 84100 ASSAY OF PHOSPHORUS: CPT

## 2019-06-27 PROCEDURE — 85379 FIBRIN DEGRADATION QUANT: CPT

## 2019-06-27 PROCEDURE — 83735 ASSAY OF MAGNESIUM: CPT

## 2019-06-27 PROCEDURE — 81025 URINE PREGNANCY TEST: CPT

## 2019-06-27 PROCEDURE — 97530 THERAPEUTIC ACTIVITIES: CPT

## 2019-06-27 PROCEDURE — 86901 BLOOD TYPING SEROLOGIC RH(D): CPT

## 2019-06-27 PROCEDURE — 93880 EXTRACRANIAL BILAT STUDY: CPT

## 2019-06-27 PROCEDURE — 87040 BLOOD CULTURE FOR BACTERIA: CPT

## 2019-06-27 PROCEDURE — 85014 HEMATOCRIT: CPT

## 2019-06-27 PROCEDURE — 82330 ASSAY OF CALCIUM: CPT

## 2019-06-27 PROCEDURE — C1884: CPT

## 2019-06-27 PROCEDURE — C1887: CPT

## 2019-06-27 PROCEDURE — 85027 COMPLETE CBC AUTOMATED: CPT

## 2019-06-27 PROCEDURE — 99233 SBSQ HOSP IP/OBS HIGH 50: CPT

## 2019-06-27 PROCEDURE — 84702 CHORIONIC GONADOTROPIN TEST: CPT

## 2019-06-27 PROCEDURE — 85610 PROTHROMBIN TIME: CPT

## 2019-06-27 PROCEDURE — 87581 M.PNEUMON DNA AMP PROBE: CPT

## 2019-06-27 PROCEDURE — 84480 ASSAY TRIIODOTHYRONINE (T3): CPT

## 2019-06-27 PROCEDURE — 95951: CPT

## 2019-06-27 PROCEDURE — 80061 LIPID PANEL: CPT

## 2019-06-27 PROCEDURE — 0042T: CPT

## 2019-06-27 PROCEDURE — 80048 BASIC METABOLIC PNL TOTAL CA: CPT

## 2019-06-27 PROCEDURE — 84436 ASSAY OF TOTAL THYROXINE: CPT

## 2019-06-27 PROCEDURE — 99285 EMERGENCY DEPT VISIT HI MDM: CPT

## 2019-06-27 PROCEDURE — 37215 TRANSCATH STENT CCA W/EPS: CPT

## 2019-06-27 PROCEDURE — 97112 NEUROMUSCULAR REEDUCATION: CPT

## 2019-06-27 PROCEDURE — C1894: CPT

## 2019-06-27 PROCEDURE — 82565 ASSAY OF CREATININE: CPT

## 2019-06-27 PROCEDURE — 94660 CPAP INITIATION&MGMT: CPT

## 2019-06-27 PROCEDURE — 36224 PLACE CATH CAROTD ART: CPT | Mod: 59

## 2019-06-27 PROCEDURE — 84295 ASSAY OF SERUM SODIUM: CPT

## 2019-06-27 PROCEDURE — 82962 GLUCOSE BLOOD TEST: CPT

## 2019-06-27 PROCEDURE — 97535 SELF CARE MNGMENT TRAINING: CPT

## 2019-06-27 PROCEDURE — 85730 THROMBOPLASTIN TIME PARTIAL: CPT

## 2019-06-27 PROCEDURE — 84132 ASSAY OF SERUM POTASSIUM: CPT

## 2019-06-27 PROCEDURE — 86850 RBC ANTIBODY SCREEN: CPT

## 2019-06-27 PROCEDURE — 82947 ASSAY GLUCOSE BLOOD QUANT: CPT

## 2019-06-27 RX ORDER — CARVEDILOL PHOSPHATE 80 MG/1
1 CAPSULE, EXTENDED RELEASE ORAL
Qty: 120 | Refills: 0
Start: 2019-06-27 | End: 2019-08-25

## 2019-06-27 RX ADMIN — ENOXAPARIN SODIUM 30 MILLIGRAM(S): 100 INJECTION SUBCUTANEOUS at 06:32

## 2019-06-27 RX ADMIN — LISINOPRIL 10 MILLIGRAM(S): 2.5 TABLET ORAL at 06:33

## 2019-06-27 RX ADMIN — CLOPIDOGREL BISULFATE 75 MILLIGRAM(S): 75 TABLET, FILM COATED ORAL at 12:09

## 2019-06-27 RX ADMIN — Medication 1 PATCH: at 12:09

## 2019-06-27 RX ADMIN — GLYCOPYRROLATE AND FORMOTEROL FUMARATE 2 PUFF(S): 9; 4.8 AEROSOL, METERED RESPIRATORY (INHALATION) at 18:04

## 2019-06-27 RX ADMIN — LEVETIRACETAM 1000 MILLIGRAM(S): 250 TABLET, FILM COATED ORAL at 06:32

## 2019-06-27 RX ADMIN — Medication 325 MILLIGRAM(S): at 12:09

## 2019-06-27 RX ADMIN — Medication 1 PATCH: at 07:24

## 2019-06-27 RX ADMIN — Medication 0.5 MILLIGRAM(S): at 12:09

## 2019-06-27 RX ADMIN — GLYCOPYRROLATE AND FORMOTEROL FUMARATE 2 PUFF(S): 9; 4.8 AEROSOL, METERED RESPIRATORY (INHALATION) at 06:32

## 2019-06-27 RX ADMIN — Medication 1 PATCH: at 12:37

## 2019-06-27 RX ADMIN — LEVETIRACETAM 1000 MILLIGRAM(S): 250 TABLET, FILM COATED ORAL at 18:04

## 2019-06-27 NOTE — PROGRESS NOTE ADULT - PROBLEM SELECTOR PLAN 4
Bi-ventricular dysfunction with EF 35%, severe pHTN, severe diastolic dysfunction   -Multivessel disease on cath, plan for CABG after rehab
Bi-ventricular dysfunction with EF 35%, severe pHTN, severe diastolic dysfunction   -Multivessel disease on cath, plan for CABG after rehab
Bi-ventricular dysfunction with EF 35%, severe pHTN, severe diastolic dysfunction   -Plan for cardiac cath today

## 2019-06-27 NOTE — PROGRESS NOTE ADULT - PROBLEM SELECTOR PROBLEM 1
R/O NIMESH (obstructive sleep apnea)

## 2019-06-27 NOTE — PROGRESS NOTE ADULT - SUBJECTIVE AND OBJECTIVE BOX
Cardiology Follow Up  ==========================================  CC: 4 Elements or 3 chronics    HPI:    Review Of Systems:  General: No fevers  GI: No nausea, no abdominal pain  Other wise negative except as documented above      Medications:    ALPRAZolam   0.5 milliGRAM(s) Oral (06-27-19 @ 12:09)    aspirin   325 milliGRAM(s) Oral (06-27-19 @ 12:09)    atorvastatin   80 milliGRAM(s) Oral (06-26-19 @ 22:34)    clopidogrel Tablet   75 milliGRAM(s) Oral (06-27-19 @ 12:09)    enoxaparin Injectable   30 milliGRAM(s) SubCutaneous (06-27-19 @ 06:32)   30 milliGRAM(s) SubCutaneous (06-26-19 @ 17:24)    glycopyrrolate 9 MICROgram(s)/formoterol 4.8 MICROgram(s)Inhaler   2 Puff(s) Inhalation (06-27-19 @ 06:32)   2 Puff(s) Inhalation (06-26-19 @ 17:25)    levETIRAcetam   1000 milliGRAM(s) Oral (06-27-19 @ 06:32)   1000 milliGRAM(s) Oral (06-26-19 @ 17:24)    lisinopril   10 milliGRAM(s) Oral (06-27-19 @ 06:33)    lisinopril   5 milliGRAM(s) Oral (06-26-19 @ 18:22)    nicotine - 21 mG/24Hr(s) Patch   1 Patch Transdermal (06-27-19 @ 12:09)        Telemetry:     Vital Signs Last 24 Hrs  T(C): 37 (27 Jun 2019 09:00), Max: 37 (27 Jun 2019 09:00)  T(F): 98.6 (27 Jun 2019 09:00), Max: 98.6 (27 Jun 2019 09:00)  HR: 83 (27 Jun 2019 12:00) (67 - 91)  BP: 152/77 (27 Jun 2019 12:00) (133/68 - 182/88)  BP(mean): 98 (27 Jun 2019 12:00) (86 - 115)  RR: 20 (27 Jun 2019 10:59) (13 - 20)  SpO2: 94% (27 Jun 2019 12:00) (94% - 98%)  I&O's Summary      Physical Exam:  General: [x ] NAD  Eyes: [x ] EOMI [ x] PERRL  HENT: [x] MMM  Neck: [x] No JVD  CV: [x ] S1,S2 [x ] RRR [x ] No M/R/G [x] Nl PMI         [x] No edema  Lungs: [x ] CTA B/L [ x] Normal effort  Abd: [x ] Soft [x ] Non-tender [x ] +BS  Ext: [x ] No cyanosis  Skin:[x] No rashes   Psych: [x] Mood and affect appropriate    Labs:                        14.4   7.9   )-----------( 311      ( 27 Jun 2019 06:48 )             49.0     06-27    137  |  102  |  18  ----------------------------<  98  4.0   |  24  |  0.87    Ca    9.0      27 Jun 2019 06:48 Cardiology Follow Up  ==========================================  CC: CVA, HF, HTN, CAD    HPI: HFrEF which seems compensated no SOB. She has CAD without chest pain. She has HTN with BP that is still elevated.     Review Of Systems:  General: No fevers  GI: No nausea, no abdominal pain  Other wise negative except as documented above      Medications:    ALPRAZolam   0.5 milliGRAM(s) Oral (06-27-19 @ 12:09)    aspirin   325 milliGRAM(s) Oral (06-27-19 @ 12:09)    atorvastatin   80 milliGRAM(s) Oral (06-26-19 @ 22:34)    clopidogrel Tablet   75 milliGRAM(s) Oral (06-27-19 @ 12:09)    enoxaparin Injectable   30 milliGRAM(s) SubCutaneous (06-27-19 @ 06:32)   30 milliGRAM(s) SubCutaneous (06-26-19 @ 17:24)    glycopyrrolate 9 MICROgram(s)/formoterol 4.8 MICROgram(s)Inhaler   2 Puff(s) Inhalation (06-27-19 @ 06:32)   2 Puff(s) Inhalation (06-26-19 @ 17:25)    levETIRAcetam   1000 milliGRAM(s) Oral (06-27-19 @ 06:32)   1000 milliGRAM(s) Oral (06-26-19 @ 17:24)    lisinopril   10 milliGRAM(s) Oral (06-27-19 @ 06:33)    lisinopril   5 milliGRAM(s) Oral (06-26-19 @ 18:22)    nicotine - 21 mG/24Hr(s) Patch   1 Patch Transdermal (06-27-19 @ 12:09)        Telemetry: No significant ectopy     Vital Signs Last 24 Hrs  T(C): 37 (27 Jun 2019 09:00), Max: 37 (27 Jun 2019 09:00)  T(F): 98.6 (27 Jun 2019 09:00), Max: 98.6 (27 Jun 2019 09:00)  HR: 83 (27 Jun 2019 12:00) (67 - 91)  BP: 152/77 (27 Jun 2019 12:00) (133/68 - 182/88)  BP(mean): 98 (27 Jun 2019 12:00) (86 - 115)  RR: 20 (27 Jun 2019 10:59) (13 - 20)  SpO2: 94% (27 Jun 2019 12:00) (94% - 98%)  I&O's Summary      Physical Exam:  General: [x ] NAD  Eyes: [x ] EOMI [ x] PERRL  HENT: [x] MMM  Neck: [x] No JVD  CV: [x ] S1,S2 [x ] RRR [x ] No M/R/G [x] Nl PMI         [x] No edema  Lungs: [x ] CTA B/L [ x] Normal effort  Abd: [x ] Soft [x ] Non-tender [x ] +BS  Ext: [x ] No cyanosis  Skin:[x] No rashes   Psych: [x] Mood and affect appropriate    Labs:                        14.4   7.9   )-----------( 311      ( 27 Jun 2019 06:48 )             49.0     06-27    137  |  102  |  18  ----------------------------<  98  4.0   |  24  |  0.87    Ca    9.0      27 Jun 2019 06:48

## 2019-06-27 NOTE — PROGRESS NOTE ADULT - SUBJECTIVE AND OBJECTIVE BOX
THE PATIENT WAS SEEN AND EXAMINED BY ME WITH THE HOUSESTAFF AND STROKE TEAM DURING MORNING ROUNDS.   HPI:  54y/o F RH  PMH Anxiety, 40 pk/yr smoking hx(still active), does not get regular checkups presented to [facility] with left arm numbness, spasms starting 20:00 on 6/19. A phone consult was performed with [facility] and during that time she had some worsening of her neurological status but left arm weakness and spasms. At that time she was given Ativan for possible seizure. CT noncontrast and CT angiogram of head and neck revealed right frontal hypodensity suggestive of right MCA infarction in the setting of bilateral severe carotid stenosis. She was then transferred to [facility], on exam NIHSS was 6, alert, awake however unable to move her left arm. She was also noticed to have continuous twitching of left arm and face, drop in oxygen saturation for which she was put on BiPAP.    SUBJECTIVE: No events overnight.  No new neurologic complaints.      acetaminophen   Tablet .. 650 milliGRAM(s) Oral every 6 hours PRN  ALBUTerol    90 MICROgram(s) HFA Inhaler 2 Puff(s) Inhalation every 6 hours PRN  ALPRAZolam 0.5 milliGRAM(s) Oral daily  aspirin 325 milliGRAM(s) Oral daily  atorvastatin 80 milliGRAM(s) Oral at bedtime  chlorhexidine 4% Liquid 1 Application(s) Topical <User Schedule>  clopidogrel Tablet 75 milliGRAM(s) Oral daily  enoxaparin Injectable 30 milliGRAM(s) SubCutaneous <User Schedule>  glycopyrrolate 9 MICROgram(s)/formoterol 4.8 MICROgram(s)Inhaler 2 Puff(s) Inhalation two times a day  levETIRAcetam 1000 milliGRAM(s) Oral two times a day  lisinopril 10 milliGRAM(s) Oral daily  nicotine - 21 mG/24Hr(s) Patch 1 patch Transdermal daily      PHYSICAL EXAM:   Vital Signs Last 24 Hrs  T(C): 36.6 (26 Jun 2019 20:44), Max: 36.7 (26 Jun 2019 07:36)  T(F): 97.9 (26 Jun 2019 20:44), Max: 98.1 (26 Jun 2019 07:36)  HR: 78 (27 Jun 2019 06:22) (67 - 90)  BP: 133/68 (27 Jun 2019 04:00) (126/62 - 171/92)  BP(mean): 86 (27 Jun 2019 04:00) (80 - 114)  RR: 15 (27 Jun 2019 04:00) (12 - 19)  SpO2: 94% (27 Jun 2019 06:22) (94% - 99%)    General: No acute distress  HEENT: EOMI, visual fields full  Abdomen: Soft, nontender, nondistended   Extremities: Right groin:  No edema, pulses are presents    NEUROLOGICAL EXAM:  Mental status: Awake, alert, oriented x3, fluent speech, follows commands  Cranial Nerves: left facial droop, EOMI, no clear gaze palsy, VFF, no nystagmus, no dysarthria,  tongue midline  Motor exam: RUE 5/5, RLE 5/5 LUE shoulder shrug is presents, remaining L arm 0/5, LLE drift 5/5  Sensation: Intact to light touch   Coordination/ Gait: No dysmetria, gait not tested    LABS:                        14.0   8.4   )-----------( 313      ( 26 Jun 2019 03:24 )             45.3    06-26    139  |  100  |  14  ----------------------------<  97  4.2   |  24  |  0.92    Ca    9.3      26 Jun 2019 03:24      Hemoglobin A1C, Whole Blood: 6.2 % (06-21 @ 11:12)      IMAGING: Reviewed by me.     VEEG (06/21/19) Normal EEG. There were no epileptiform abnormalities or seizures recorded.     MR Head No Cont (06.20.19) Acute right frontoparietal and temporal lobe infarcts. Possible subacute subcortical white matter infarct right frontal lobe. No acute intracranial hemorrhage. Recommend further imaging of the vascular system with MRAs of the brain and neck.    CT Head No Cont (06.20.19 11:45) No significant change since 6:56 AM. Subtle lucency in the right parietal region in a portion of the right middle cerebral artery distribution. No hemorrhage.    CT Head No Cont (06.20.19 07:25) No interval change in right middle cerebral artery infarct involving the right precentral gyrus and centrum semiovale. No hemorrhage. ASPECTS score 8 out of 10.    CT Head No Cont (06.20.19) Acute right middle cerebral artery infarct involving the right precentral gyrus and centrum semiovale. ASPECTS score 8 out of 10. No hemorrhage or midline shift.     CTA Head and Neck w/ IV Cont (06.20.19) Severe stenosis at the origin of the right internal carotid artery with filling defect in the anterior cavernous segment suspicious for critical stenosis/focal occlusion. Moderate to severe stenosis of the proximal cervical segment and mild stenosis of the posterior cavernous segment of the left internal carotid artery. THE PATIENT WAS SEEN AND EXAMINED BY ME WITH THE HOUSESTAFF AND STROKE TEAM DURING MORNING ROUNDS.   HPI:  52y/o F RH  PMH Anxiety, 40 pk/yr smoking hx(still active), does not get regular checkups presented to [facility] with left arm numbness, spasms starting 20:00 on 6/19. A phone consult was performed with [facility] and during that time she had some worsening of her neurological status but left arm weakness and spasms. At that time she was given Ativan for possible seizure. CT noncontrast and CT angiogram of head and neck revealed right frontal hypodensity suggestive of right MCA infarction in the setting of bilateral severe carotid stenosis. She was then transferred to [facility], on exam NIHSS was 6, alert, awake however unable to move her left arm. She was also noticed to have continuous twitching of left arm and face, drop in oxygen saturation for which she was put on BiPAP.    SUBJECTIVE: "Feels fingers more" No events overnight.  No new neurologic complaints.      acetaminophen   Tablet .. 650 milliGRAM(s) Oral every 6 hours PRN  ALBUTerol    90 MICROgram(s) HFA Inhaler 2 Puff(s) Inhalation every 6 hours PRN  ALPRAZolam 0.5 milliGRAM(s) Oral daily  aspirin 325 milliGRAM(s) Oral daily  atorvastatin 80 milliGRAM(s) Oral at bedtime  chlorhexidine 4% Liquid 1 Application(s) Topical <User Schedule>  clopidogrel Tablet 75 milliGRAM(s) Oral daily  enoxaparin Injectable 30 milliGRAM(s) SubCutaneous <User Schedule>  glycopyrrolate 9 MICROgram(s)/formoterol 4.8 MICROgram(s)Inhaler 2 Puff(s) Inhalation two times a day  levETIRAcetam 1000 milliGRAM(s) Oral two times a day  lisinopril 10 milliGRAM(s) Oral daily  nicotine - 21 mG/24Hr(s) Patch 1 patch Transdermal daily    PHYSICAL EXAM:   Vital Signs Last 24 Hrs  T(C): 36.6 (26 Jun 2019 20:44), Max: 36.7 (26 Jun 2019 07:36)  T(F): 97.9 (26 Jun 2019 20:44), Max: 98.1 (26 Jun 2019 07:36)  HR: 78 (27 Jun 2019 06:22) (67 - 90)  BP: 133/68 (27 Jun 2019 04:00) (126/62 - 171/92)  BP(mean): 86 (27 Jun 2019 04:00) (80 - 114)  RR: 15 (27 Jun 2019 04:00) (12 - 19)  SpO2: 94% (27 Jun 2019 06:22) (94% - 99%)    General: No acute distress  HEENT: EOMI, visual fields full  Abdomen: Soft, nontender, nondistended   Extremities: Right groin:  No edema, pulses are presents    NEUROLOGICAL EXAM:  Mental status: Awake, alert, oriented x3, fluent speech, follows commands  Cranial Nerves: left facial droop, EOMI, subtle left gaze palsy, VFF, no nystagmus, no dysarthria,  tongue midline  Motor exam: RUE 5/5, RLE 5/5 LUE shoulder shrug is presents, remaining L arm 0/5, LLE drift 5/5  Sensation: Intact to light touch   Coordination/ Gait: No dysmetria, gait not tested    LABS:                        14.0   8.4   )-----------( 313      ( 26 Jun 2019 03:24 )             45.3    06-26    139  |  100  |  14  ----------------------------<  97  4.2   |  24  |  0.92    Ca    9.3      26 Jun 2019 03:24      Hemoglobin A1C, Whole Blood: 6.2 % (06-21 @ 11:12)      IMAGING: Reviewed by me.     VEEG (06/21/19) Normal EEG. There were no epileptiform abnormalities or seizures recorded.     MR Head No Cont (06.20.19) Acute right frontoparietal and temporal lobe infarcts. Possible subacute subcortical white matter infarct right frontal lobe. No acute intracranial hemorrhage. Recommend further imaging of the vascular system with MRAs of the brain and neck.    CT Head No Cont (06.20.19 11:45) No significant change since 6:56 AM. Subtle lucency in the right parietal region in a portion of the right middle cerebral artery distribution. No hemorrhage.    CT Head No Cont (06.20.19 07:25) No interval change in right middle cerebral artery infarct involving the right precentral gyrus and centrum semiovale. No hemorrhage. ASPECTS score 8 out of 10.    CT Head No Cont (06.20.19) Acute right middle cerebral artery infarct involving the right precentral gyrus and centrum semiovale. ASPECTS score 8 out of 10. No hemorrhage or midline shift.     CTA Head and Neck w/ IV Cont (06.20.19) Severe stenosis at the origin of the right internal carotid artery with filling defect in the anterior cavernous segment suspicious for critical stenosis/focal occlusion. Moderate to severe stenosis of the proximal cervical segment and mild stenosis of the posterior cavernous segment of the left internal carotid artery.

## 2019-06-27 NOTE — PROGRESS NOTE ADULT - ASSESSMENT
54y/o F RH  PMH Anxiety, 40 pk/yr smoking hx(still active), does not get regular checkups presented to [facility] with left arm numbness, spasms starting 20:00 on 6/19. A phone consult was performed with [facility] and during that time she had some worsening of her neurological status but left arm weakness and spasms. At that time she was given Ativan for possible seizure. CT noncontrast and CT angiogram of head and neck revealed right frontal hypodensity suggestive of right MCA infarction in the setting of bilateral severe carotid stenosis. She was then transferred to [facility].     Impression:  #1 Right MCA artery ischemic infarct in the setting of large artery atherosclerosis - infarct pattern suggestive of watershed MCA -PCA and CORBIN-MCA and MCA embolic appearing infarct in the setting of Right carotid severe stenosis  #2 bilateral carotid extracranial stenosis    NEURO: neurologically without acute change, She is s/p right internal carotid stent stent place on 6/21/19 with Dr. Barrios. Carotid dopplers performed: unimpeded antegrade flow through the stented right ICA, severe, >70% stenosis of the left ICA, flow limiting stenosis of b/l ECA , SBP goal 100-140mmhg in setting of recent stent placement, gradual normotension as well given asx incidental right ICA stenosis, Continue high dose statin. MRI Brain w/o contrast showed acute right frontoparietal and temporal lobe infarcts. On 6/20 AM she was noted to have jerking movements of the left hand to LUE with subsequent fascial twitching and resolved with 2mg of IV Ativan, 1g of Keppra. VEEG with no epileptiform abnormalities or seizures recorded. Continue Keppra 1g BID for seizure ppx, maintain seizure precautions. Physical therapy/OT with recommendations for AR, continue current rehab treatment. LUE splint ordered.     ANTITHROMBOTIC THERAPY: ASA/plavix as s/p stent. P2Y12 06/22 was 84 indicating optimal platelet inhibition.     PULMONARY: CXR(06/20/19) No focal consolidation. Patient presented with acute respiratory failure due to acute on chronic pulmonary edema/ systolic and diastolic CHF treated with BIPAP and IV lasix and has now improved .  Currently saturating well on nasal cannula. Advised by pulmonary to start on CPAP for NIMESH. Will recommend outpatient sleep study. Duoneb prn, Bevespi BID    CARDIOVASCULAR: TTE : EF 30-35%, severely dilated left atrium, severe diastolic dysfunction, no PFO. Dr Suma quinones (Cardiology) appreciated, noted previously with bradycardia and hypertension, s/p cardiac cath on 06/25/19: 3 VCAD, as per CT surgery eval: CABG as outpt once able to be taken of Plavix from neurological standpoint given carotid stenting. Discussed with Pt about AF study, she deferred study at this time     SBP goal: -140    GASTROINTESTINAL:  dysphagia screen passed, tolerating diet     Diet: Regular    RENAL: BUN/Cr without acute change, maintain adequate hydration as tolerated      Na Goal: Greater than 135     Nichole: No    HEMATOLOGY: H/H without acute change No signs of bleeding noted.      DVT ppx: lovenox    ID: afebrile, no signs of infection.     OTHER: Plan endorsed to patient and her family at bedside, all questions and concerns addressed. Current smoker- smoking cessation and education provided. Nicotine patch       DISPOSITION: AR      CORE MEASURES:        Admission NIHSS: 6     TPA: NO      LDL/HDL: 71/27     Depression Screen: 0     Statin Therapy: yes     Dysphagia Screen: PASS     Smoking YES     Afib  NO     Stroke Education YES    Obtain screening lower extremity venous ultrasound in patients who meet 1 or more of the following criteria as patient is high risk for DVT/PE on admission:   [] History of DVT/PE  []Hypercoagulable states (Factor V Leiden, Cancer, OCP, etc. )  []Prolonged immobility (hemiplegia/hemiparesis/post operative or any other extended immobilization)  [] Transferred from outside facility (Rehab or Long term care)  [] Age </= to 50

## 2019-06-27 NOTE — PROGRESS NOTE ADULT - SUBJECTIVE AND OBJECTIVE BOX
Follow-up Pulm Progress Note    No new respiratory events overnight.  Denies SOB/CP.   98% on RA awake  87% on RA while sleeping    Medications:  MEDICATIONS  (STANDING):  ALPRAZolam 0.5 milliGRAM(s) Oral daily  aspirin 325 milliGRAM(s) Oral daily  atorvastatin 80 milliGRAM(s) Oral at bedtime  chlorhexidine 4% Liquid 1 Application(s) Topical <User Schedule>  clopidogrel Tablet 75 milliGRAM(s) Oral daily  enoxaparin Injectable 30 milliGRAM(s) SubCutaneous <User Schedule>  glycopyrrolate 9 MICROgram(s)/formoterol 4.8 MICROgram(s)Inhaler 2 Puff(s) Inhalation two times a day  levETIRAcetam 1000 milliGRAM(s) Oral two times a day  lisinopril 10 milliGRAM(s) Oral daily  nicotine - 21 mG/24Hr(s) Patch 1 patch Transdermal daily    MEDICATIONS  (PRN):  acetaminophen   Tablet .. 650 milliGRAM(s) Oral every 6 hours PRN Temp greater or equal to 38C (100.4F), Mild Pain (1 - 3), Moderate Pain (4 - 6), Severe Pain (7 - 10)  ALBUTerol    90 MICROgram(s) HFA Inhaler 2 Puff(s) Inhalation every 6 hours PRN Shortness of Breath and/or Wheezing          Vital Signs Last 24 Hrs  T(C): 37 (27 Jun 2019 09:00), Max: 37 (27 Jun 2019 09:00)  T(F): 98.6 (27 Jun 2019 09:00), Max: 98.6 (27 Jun 2019 09:00)  HR: 83 (27 Jun 2019 12:00) (67 - 91)  BP: 152/77 (27 Jun 2019 12:00) (133/68 - 182/88)  BP(mean): 98 (27 Jun 2019 12:00) (86 - 115)  RR: 20 (27 Jun 2019 10:59) (13 - 20)  SpO2: 94% (27 Jun 2019 12:00) (94% - 98%) on RA              LABS:                        14.4   7.9   )-----------( 311      ( 27 Jun 2019 06:48 )             49.0     06-27    137  |  102  |  18  ----------------------------<  98  4.0   |  24  |  0.87    Ca    9.0      27 Jun 2019 06:48            CAPILLARY BLOOD GLUCOSE                            CULTURES: (if applicable)  Culture Results:   No growth at 5 days. (06-20 @ 22:03)  Culture Results:   No growth at 5 days. (06-20 @ 22:03)          Physical Examination:  PULM: Clear to auscultation bilaterally, no significant sputum production  CVS: S1, S2 heard    RADIOLOGY REVIEWED  CXR: Low lung volumes

## 2019-06-27 NOTE — PROGRESS NOTE ADULT - PROBLEM SELECTOR PLAN 2
Current smoker, 35 pack yrs  -Duoneb PRN  -Bevespi BID, empirically for COPD  -Outpt PFT's.
Current smoker, 35 pack yrs  -Duoneb PRN  -Bevespi BID, empirically for COPD  -Outpt PFT's.
Current smoker, 35 pack yrs  -Duoneb PRN  -Start Bevespi BID   -Outpt PFT's.

## 2019-06-27 NOTE — PROGRESS NOTE ADULT - PROBLEM SELECTOR PLAN 3
Motivated to quit  -Nicotine patch 21mg.

## 2019-06-27 NOTE — PROGRESS NOTE ADULT - PROBLEM SELECTOR PROBLEM 4
Systolic and diastolic CHF, acute

## 2019-06-27 NOTE — PROGRESS NOTE ADULT - ATTENDING COMMENTS
agree with above; ROS otherwise negative
I spoke to the patient and patient's  in detail about the above plan of care.
agree with above; ROS otherwise negative

## 2019-06-27 NOTE — PROGRESS NOTE ADULT - PROBLEM SELECTOR PROBLEM 2
R/O COPD (chronic obstructive pulmonary disease)

## 2019-06-27 NOTE — PROGRESS NOTE ADULT - PROBLEM SELECTOR PLAN 1
Nocturnal hypoxia likely 2nd NIMESH  -STOP-BANG score 7: high risk of NIMESH  -Epimeric CPAP @5 qHS, patient only able to tolerate nasal CPAP  -Dedicated outpt PSG, will not qualify for home CPAP from hospital  -d/c planning to acute rehab today
Nocturnal hypoxia likely 2nd NIMESH  -STOP-BANG score 7: high risk of NIMESH  -Epimeric CPAP @5 qHS, patient only able to tolerate nasal CPAP  -Dedicated outpt PSG, will not qualify for home CPAP from hospital  -d/c planning to acute rehab today
Nocturnal hypoxia likely 2nd NIMESH  -STOP-BANG score 7: high risk of NIMESH  -Epimeric CPAP @5 qHS, patient only able to tolerate nasal CPAP  -Dedicated outpt PSG, will not qualify for home CPAP from hospital

## 2019-06-27 NOTE — PROGRESS NOTE ADULT - PROVIDER SPECIALTY LIST ADULT
Cardiology
Cardiology
NSICU
Neurology
Neurosurgery
Neurosurgery
Pulmonology
Pulmonology
Cardiology
Neurology
Neurology
Cardiology
NSICU
Neurology
Pulmonology

## 2019-06-27 NOTE — PROGRESS NOTE ADULT - ASSESSMENT
54y/o F RH  PMH Anxiety, 40 pk/yr smoking hx(still active), does not get regular checkups presented to [facility] w/ L arm numbness, spasms starting 2000 6/19. LKW 6/19 2000. CT/CTA H/N done w/ severe stenosis at the origin of the right internal carotid artery with filling defect in the anterior cavernous segment suspicious for critical stenosis/focal occlusion as well as moderate to severe stenosis of the proximal cervical segment and mild stenosis of the posterior cavernous segment of the left internal carotid artery.   ·	Given BP, CAD and LV dysfunction start beta blocker.   ·	Continue ACE-I, Statin  ·	Continue DAPT  ·	Plan for CABG when Plavix can be held. If symptoms develop may need PCI.   ·	Follow up in the office after rehab.    =======================================================================  Lito Moise MD [facility]    Please page [phone] with questions  On nights and weekend please call the office [phone].  For all Cardiology service contact information, go to [email or web address] and use "[email or web address]" to login.

## 2019-06-27 NOTE — PROGRESS NOTE ADULT - ASSESSMENT
54 y/o F with PMH of anxiety, current smoker (35 pack yrs) presents with L arm numbness found to have R MCA CVA s/p R ICA stent 6/21 and severe R carotid artery stenosis. Called to eval for nocturnal hypoxia and concern for underlying COPD. Patient states she is not currently dyspneic, does endorse snoring at night,  has witnessed periods of apnea. TTE with severe diastolic dysfunction, global LV dysfunction (EF 35%), severe pHTN with RV systolic dysfunction. Cardiac cath 6/25 with multi-vessel disease, plan for CABG in the future

## 2019-06-27 NOTE — PROGRESS NOTE ADULT - NSHPATTENDINGPLANDISCUSS_GEN_ALL_CORE
stroke team
stroke team
Stroke and  Neurointerventional team, patient and family
stroke team
stroke team

## 2019-07-31 ENCOUNTER — APPOINTMENT (OUTPATIENT)
Dept: CARDIOTHORACIC SURGERY | Facility: CLINIC | Age: 54
End: 2019-07-31
Payer: COMMERCIAL

## 2019-07-31 VITALS
DIASTOLIC BLOOD PRESSURE: 91 MMHG | RESPIRATION RATE: 12 BRPM | HEART RATE: 76 BPM | TEMPERATURE: 97.6 F | OXYGEN SATURATION: 97 % | SYSTOLIC BLOOD PRESSURE: 154 MMHG

## 2019-07-31 VITALS — BODY MASS INDEX: 34.56 KG/M2 | HEIGHT: 68 IN | WEIGHT: 228 LBS

## 2019-07-31 DIAGNOSIS — F17.200 NICOTINE DEPENDENCE, UNSPECIFIED, UNCOMPLICATED: ICD-10-CM

## 2019-07-31 DIAGNOSIS — Z86.73 PERSONAL HISTORY OF TRANSIENT ISCHEMIC ATTACK (TIA), AND CEREBRAL INFARCTION W/OUT RESIDUAL DEFICITS: ICD-10-CM

## 2019-07-31 DIAGNOSIS — Z82.49 FAMILY HISTORY OF ISCHEMIC HEART DISEASE AND OTHER DISEASES OF THE CIRCULATORY SYSTEM: ICD-10-CM

## 2019-07-31 DIAGNOSIS — R73.03 PREDIABETES.: ICD-10-CM

## 2019-07-31 PROCEDURE — 99214 OFFICE O/P EST MOD 30 MIN: CPT

## 2019-07-31 RX ORDER — ATORVASTATIN CALCIUM 80 MG/1
80 TABLET, FILM COATED ORAL
Qty: 30 | Refills: 1 | Status: ACTIVE | COMMUNITY

## 2019-07-31 RX ORDER — CLOPIDOGREL BISULFATE 75 MG/1
75 TABLET, FILM COATED ORAL DAILY
Qty: 7 | Refills: 0 | Status: ACTIVE | COMMUNITY

## 2019-07-31 NOTE — DATA REVIEWED
[FreeTextEntry1] : TTE : EF 30-35%, severely dilated left atrium, severe diastolic dysfunction. \par \par Cardiac cath that showed triple vessel disease.\par \par MR Head No Cont on 06.20.19 demonstrated Acute right frontoparietal and temporal lobe \par infarcts. Possible subacute subcortical white matter infarct right frontal \par lobe. No acute intracranial hemorrhage. Recommend further imaging of the vascular system with MRAs of the brain and neck. \par \par CT Head No Cont on 06.20.19 demonstrated No significant change since 6:56 AM. Subtle \par lucency in the right parietal region in a portion of the right middle cerebral \par artery distribution. No hemorrhage.\par \par Carotid dopplers showed unimpeded antegrade flow through the stented right internal carotid \par artery.There is a severe, greater than 70% stenosis of the left internal \par carotid artery. There are flow-limiting stenoses of both the right and left \par external carotid arteries. MRI Brain w/o contrast showed acute right \par frontoparietal and temporal lobe infarcts. \par

## 2019-07-31 NOTE — HISTORY OF PRESENT ILLNESS
[FreeTextEntry1] : Mariana is a 53 year old female with PMH of obesity, HLD, HTN, Anxiety, current smoker (recently quit), does not get regular checkups who was recently hospitalized for acute right MCA infarct in the setting of bilateral severe carotid stenosis. She presented to OSH with left upper extremity weakness and subsequent seizure activity. CTA Head and Neck demonstrated severe stenosis at the origin of the \par right internal carotid artery with filling defect in the anterior cavernous segment suspicious for critical stenosis/focal occlusion. Moderate to severe stenosis of the proximal cervical segment and mild stenosis \par of the posterior cavernous segment of the left internal carotid artery. Neurology consulted and she underwent a.SANDIE stent place on 6/21/19 with Dr. Barrios. TTE demonstrated severely dilated left atrium, severe diastolic dysfunction and reduced EF (30-35%) and cardiac Catherization showed triple vessel disease. She was discharged to rehab, is now discharged to home and presents for outpatient surgical followup. Today she feels well, denies angina, GILMORE or syncope. She states that inpatient and outpatient rehab was helpful for her recovery and she is able to move her LUE more. She is independent in all her ADLs. \par \par \par \par

## 2019-07-31 NOTE — REVIEW OF SYSTEMS
[Limb Weakness] : limb weakness [Negative] : Heme/Lymph [Convulsions] : no convulsions [Dizziness] : no dizziness [Fainting] : no fainting [Difficulty Walking] : no difficulty walking [de-identified] : Left upper extremity 3/5

## 2019-07-31 NOTE — CONSULT LETTER
[Dear  ___] : Dear ~KIMBERLY, [Consult Letter:] : I had the pleasure of evaluating your patient, [unfilled]. [Consult Closing:] : Thank you very much for allowing me to participate in the care of this patient.  If you have any questions, please do not hesitate to contact me. [FreeTextEntry2] : Lito Moise [FreeTextEntry1] : She is s/p CVA with residual L sided arm weakness.  She had a Carotid stent by Dr. Barrios. She is on Plavix.  She has TVD but is asymptomatic.  She had decreased LV function on a previous echo.  I would like another echo to evaluate herLV function.  I have recommended CABG once she is cleared by Neuro (  Dr. Libman)  and DR. Barrios feels we can stop the plavix.  She would like to discuss with you the possibility of PCI instead.  Shon did her original Cath. they may want to discuss this with him.

## 2019-07-31 NOTE — ASSESSMENT
[FreeTextEntry1] : Mariana is a 53 year old female with PMH of obesity, HLD, HTN, Anxiety, current smoker (recently quit), does not get regular checkups who was recently hospitalized for acute right MCA infarct in the setting of bilateral severe carotid stenosis. She presented to OSH with left upper extremity weakness and subsequent seizure activity. CTA Head and Neck demonstrated severe stenosis at the origin of the right internal carotid artery with filling defect in the anterior cavernous segment suspicious for critical stenosis/focal occlusion. Moderate to severe stenosis of the proximal cervical segment and mild stenosis of the posterior cavernous segment of the left internal carotid artery. Neurology consulted and she underwent a.SANDIE stent place on 6/21/19 with Dr. Barrios. TTE demonstrated severely dilated left atrium, severe diastolic dysfunction and reduced EF (30-35%) and cardiac Catherization showed triple vessel disease. She was discharged to rehab, is now discharged to home and presents for outpatient surgical followup. Today she feels well, denies angina, GILMORE or syncope. She states that inpatient and outpatient rehab was helpful for her recovery and she is able to move her LUE more. She is independent in all her ADLs. \par \par I reviewed the cardiac imaging, medical records and reports with patient. I discussed the risks, benefits and alternatives to surgery. Risks included but not limited to bleeding, stroke, myocardial Infarction, kidney problems, blood transfusion, permanent pacemaker implantation, infections and death. I quoted an operative mortality and complication risks as Low. I also discussed the various approaches in detail. The patient will benefit and is a candidate for coronary artery revascularization. All questions have been fully answered and concerns addressed. I would recommend to repeat TTE to evaluate the LV function. Followup with Neuro regarding stopping the Plavix and re imaging. She will follow up with Dr. Moise this week.  She will return for followup visit. \par \par

## 2019-07-31 NOTE — PHYSICAL EXAM
[General Appearance - In No Acute Distress] : in no acute distress [General Appearance - Alert] : alert [Sclera] : the sclera and conjunctiva were normal [PERRL With Normal Accommodation] : pupils were equal in size, round, and reactive to light [Extraocular Movements] : extraocular movements were intact [Oropharynx] : the oropharynx was normal [Outer Ear] : the ears and nose were normal in appearance [Jugular Venous Distention Increased] : there was no jugular-venous distention [] : no respiratory distress [Respiration, Rhythm And Depth] : normal respiratory rhythm and effort [Examination Of The Chest] : the chest was normal in appearance [Heart Rate And Rhythm] : heart rate was normal and rhythm regular [Breast Appearance] : normal in appearance [Bowel Sounds] : normal bowel sounds [Cervical Lymph Nodes Enlarged Posterior Bilaterally] : posterior cervical [Abdomen Soft] : soft [Cervical Lymph Nodes Enlarged Anterior Bilaterally] : anterior cervical [No CVA Tenderness] : no ~M costovertebral angle tenderness [Abnormal Walk] : normal gait [Skin Color & Pigmentation] : normal skin color and pigmentation [Impaired Insight] : insight and judgment were intact [Oriented To Time, Place, And Person] : oriented to person, place, and time [FreeTextEntry1] : deferred

## 2019-08-02 ENCOUNTER — NON-APPOINTMENT (OUTPATIENT)
Age: 54
End: 2019-08-02

## 2019-08-02 ENCOUNTER — APPOINTMENT (OUTPATIENT)
Dept: CARDIOLOGY | Facility: CLINIC | Age: 54
End: 2019-08-02
Payer: COMMERCIAL

## 2019-08-02 VITALS — DIASTOLIC BLOOD PRESSURE: 89 MMHG | OXYGEN SATURATION: 97 % | HEART RATE: 75 BPM | SYSTOLIC BLOOD PRESSURE: 159 MMHG

## 2019-08-02 PROCEDURE — 99214 OFFICE O/P EST MOD 30 MIN: CPT

## 2019-08-02 PROCEDURE — 93000 ELECTROCARDIOGRAM COMPLETE: CPT

## 2019-08-02 RX ORDER — ALBUTEROL SULFATE 2.5 MG/3ML
(2.5 MG/3ML) VIAL, NEBULIZER (ML) INHALATION EVERY 4 HOURS
Refills: 0 | Status: COMPLETED | COMMUNITY
End: 2019-08-02

## 2019-08-02 RX ORDER — ATORVASTATIN CALCIUM 80 MG/1
80 TABLET, FILM COATED ORAL
Qty: 30 | Refills: 6 | Status: COMPLETED | COMMUNITY
End: 2019-08-02

## 2019-08-02 RX ORDER — ASPIRIN 325 MG/1
325 TABLET, COATED ORAL
Qty: 90 | Refills: 0 | Status: COMPLETED | COMMUNITY
End: 2019-08-02

## 2019-08-02 RX ORDER — CARVEDILOL 3.12 MG/1
3.12 TABLET, FILM COATED ORAL
Refills: 1 | Status: COMPLETED | COMMUNITY
End: 2019-08-02

## 2019-08-02 RX ORDER — CLOPIDOGREL 75 MG/1
75 TABLET, FILM COATED ORAL DAILY
Qty: 30 | Refills: 0 | Status: COMPLETED | COMMUNITY
End: 2019-08-02

## 2019-08-02 RX ORDER — LEVETIRACETAM 1000 MG/1
1000 TABLET, FILM COATED ORAL TWICE DAILY
Refills: 0 | Status: COMPLETED | COMMUNITY
End: 2019-08-02

## 2019-08-02 RX ORDER — LISINOPRIL 10 MG/1
10 TABLET ORAL
Qty: 30 | Refills: 5 | Status: COMPLETED | COMMUNITY
End: 2019-08-02

## 2019-08-02 NOTE — PHYSICAL EXAM
[General Appearance - Well Developed] : well developed [Normal Appearance] : normal appearance [Well Groomed] : well groomed [General Appearance - Well Nourished] : well nourished [No Deformities] : no deformities [General Appearance - In No Acute Distress] : no acute distress [Eyelids - No Xanthelasma] : the eyelids demonstrated no xanthelasmas [Normal Conjunctiva] : the conjunctiva exhibited no abnormalities [Normal Oral Mucosa] : normal oral mucosa [No Oral Pallor] : no oral pallor [Normal Jugular Venous A Waves Present] : normal jugular venous A waves present [No Oral Cyanosis] : no oral cyanosis [Normal Jugular Venous V Waves Present] : normal jugular venous V waves present [No Jugular Venous Cabezas A Waves] : no jugular venous cabezas A waves [Respiration, Rhythm And Depth] : normal respiratory rhythm and effort [Exaggerated Use Of Accessory Muscles For Inspiration] : no accessory muscle use [Heart Rate And Rhythm] : heart rate and rhythm were normal [Auscultation Breath Sounds / Voice Sounds] : lungs were clear to auscultation bilaterally [Heart Sounds] : normal S1 and S2 [Murmurs] : no murmurs present [Abdomen Soft] : soft [Abdomen Tenderness] : non-tender [Abdomen Mass (___ Cm)] : no abdominal mass palpated [Abnormal Walk] : normal gait [Gait - Sufficient For Exercise Testing] : the gait was sufficient for exercise testing [Nail Clubbing] : no clubbing of the fingernails [Cyanosis, Localized] : no localized cyanosis [Petechial Hemorrhages (___cm)] : no petechial hemorrhages [Skin Color & Pigmentation] : normal skin color and pigmentation [] : no rash [No Venous Stasis] : no venous stasis [Skin Lesions] : no skin lesions [No Skin Ulcers] : no skin ulcer [No Xanthoma] : no  xanthoma was observed [Affect] : the affect was normal [Oriented To Time, Place, And Person] : oriented to person, place, and time [No Anxiety] : not feeling anxious [Mood] : the mood was normal

## 2019-08-07 ENCOUNTER — CLINICAL ADVICE (OUTPATIENT)
Age: 54
End: 2019-08-07

## 2019-08-14 ENCOUNTER — APPOINTMENT (OUTPATIENT)
Dept: NEUROLOGY | Facility: CLINIC | Age: 54
End: 2019-08-14

## 2019-08-14 ENCOUNTER — APPOINTMENT (OUTPATIENT)
Dept: NEUROLOGY | Facility: CLINIC | Age: 54
End: 2019-08-14
Payer: COMMERCIAL

## 2019-08-14 VITALS
HEART RATE: 62 BPM | HEIGHT: 68 IN | DIASTOLIC BLOOD PRESSURE: 85 MMHG | WEIGHT: 228 LBS | SYSTOLIC BLOOD PRESSURE: 167 MMHG | BODY MASS INDEX: 34.56 KG/M2

## 2019-08-14 DIAGNOSIS — G47.33 OBSTRUCTIVE SLEEP APNEA (ADULT) (PEDIATRIC): ICD-10-CM

## 2019-08-14 DIAGNOSIS — R06.83 SNORING: ICD-10-CM

## 2019-08-14 PROCEDURE — 99214 OFFICE O/P EST MOD 30 MIN: CPT

## 2019-08-22 NOTE — DISCUSSION/SUMMARY
[FreeTextEntry1] : Increase Coreg. Then increase lisinopril. Aim is to wean off hydralazine. \par Discuss with Barrios when ok to stop plavix. Follow up with CT\par Would repeat echo prior to OR. \par Follow up in 4-6 weeks.

## 2019-08-22 NOTE — HISTORY OF PRESENT ILLNESS
[FreeTextEntry1] : Overall progressing well. With recovery of neuro deficits and improvement in her strength\par No cardiac symptoms. No chest pain. No shortness of breath. No LE edema. No palpitations.

## 2019-08-29 ENCOUNTER — APPOINTMENT (OUTPATIENT)
Dept: NEUROLOGY | Facility: CLINIC | Age: 54
End: 2019-08-29

## 2019-09-11 ENCOUNTER — APPOINTMENT (OUTPATIENT)
Dept: NEUROLOGY | Facility: CLINIC | Age: 54
End: 2019-09-11
Payer: COMMERCIAL

## 2019-09-11 PROCEDURE — 95816 EEG AWAKE AND DROWSY: CPT

## 2019-09-15 ENCOUNTER — OTHER (OUTPATIENT)
Age: 54
End: 2019-09-15

## 2019-09-16 ENCOUNTER — APPOINTMENT (OUTPATIENT)
Dept: NEUROSURGERY | Facility: CLINIC | Age: 54
End: 2019-09-16
Payer: COMMERCIAL

## 2019-09-16 ENCOUNTER — APPOINTMENT (OUTPATIENT)
Dept: NEUROLOGY | Facility: CLINIC | Age: 54
End: 2019-09-16
Payer: COMMERCIAL

## 2019-09-16 VITALS
HEIGHT: 68 IN | SYSTOLIC BLOOD PRESSURE: 121 MMHG | HEART RATE: 64 BPM | DIASTOLIC BLOOD PRESSURE: 75 MMHG | BODY MASS INDEX: 34.56 KG/M2 | WEIGHT: 228 LBS

## 2019-09-16 PROCEDURE — 99024 POSTOP FOLLOW-UP VISIT: CPT

## 2019-09-16 PROCEDURE — 99215 OFFICE O/P EST HI 40 MIN: CPT

## 2019-09-16 NOTE — ASSESSMENT
[FreeTextEntry1] : Impression: 53yr old female s/p right carotid angioplasty and stenting for symptomatic stenosis\par \par Plan: \par Discussed RMCA infarct caused by the right carotid stenosis \par Continue aspirin 325mg daily \par Continue Plavix 75mg daily (PRU 84)\par Carotid Doppler Ultrasound post stent SANDIE PSV= 52 LICA PSV= 349\par Repeat Carotid Doppler Ultrasound six months from intervention. December 2019 then follow up in the office after. \par Typically keep patients on dual antiplatelet therapy with asa and plavix for six months following carotid angioplasty and stenting. Will like to treat the left side carotid stenosis in the future. \par From Neurosurgical stand point ok to stop plavix 1 week before CABG surgery, patient has been on plavix for at least two months already. Preferably stay on aspirin 325mg daily. Recommend having the CABG done prior to treating the left sided carotid stenosis because it is currently asymptomatic.

## 2019-09-16 NOTE — ASSESSMENT
[FreeTextEntry1] : Ms. MENDEZ presents with onset of focal seizures in setting of acutre stroke, initially recurring at initial doses of Keppra, with no further recurrence on levetiracetam 1000 q12.  Exam is notable for mild R HP, affecting arm most, face minimally, and sparing leg. Ms. MENDEZ is planning coronary artery bypass surgery (3 vessels) when she has the clearance from Dr. Barrios that the carotid stent is stable and antiplatelet treatment can be held.\par \par Plan\par 1. continue levetiracetam 1000 mg q12 for 6 mo from initial stroke/seizure. No side effects noted. \par 2. After 6 mo since stroke - or possibly after 3 mo from CABG - we will begin slow taper of levetiracetam by 250 mg per dose in monthly intervals. \par 3. will likely get amb EEG prior to starting taper. \par 4. if side effects develop - will need to consider earlier taper. \par 5. [facility] driving regulations reviewed. Ms. MENDEZ counselled not to drive for 12 mo from last seizure.\par \par I have spent 40 minutes of face to face time with the patient reviewing the cause of seizures or seizure-like events, assessing the risk of recurrence, educating the patient or family to recognize seizures, and discussing possible treatment options and possible side effects of seizure medications. I also discussed seizure safety, and ways of reducing seizure risk. Greater than 50% of the encounter time was spent on counseling and coordination of care for reviewing records in [employer], discussion with patient regarding plan.

## 2019-09-16 NOTE — REASON FOR VISIT
[Follow-Up: _____] : a [unfilled] follow-up visit [Spouse] : spouse [FreeTextEntry1] : Mariana Mcgowan is a 53yr old female here for a hospital follow up visit. She presented to [facility] with left arm weakness and spasm. CT CTA showed RMCA infarction in the setting of bilateral severe carotid stenosis. She was transferred to [facility]. She underwent angioplasty and stenting of symptomatic right carotid stenosis 6/21/2019. Tolerated procedure well was placed on dual antiplatelet therapy with aspirn and plavix. PRU therapeutic at 84. Post procedure carotid doppler ultrasound demonstrated patency of the stent. She had left facial twitching and started on keppra 1gram twice a day. ECHO demonstrated EF of 30-35%. She had cardiac cath done which showed triple vessel disease. Differed treatment until outpatient. Discharged to acute rehab 6/27/2019. Since she has been home she has been feeling well. She is still working with occupational therapy. She feels as if she is almost back to her baseline status. She denies any new motor, sensory, speech, visual abnormalities. She denies any recent seizure activity as well. \par \par PCP: \par Neurologist: Dr. Libmann, Alicia Dee NP\par Cardiologist: Dr. Moise\par Cardiothoracic: Dr. Rivera \par Epilepsy: Dr. Raines

## 2019-09-16 NOTE — HISTORY OF PRESENT ILLNESS
[FreeTextEntry1] : *** 09/16/2019  ***\par Ms. MCGOWAN presents for evaluation of seizures that occurred at onset of R MCA stroke from R ICA stenosis in June 2019.  MRI DWI shows signal abnormality in watershed distribution.  Ms. MCGOWAN recalls that initial symptoms was jerking on L arm that lasted about 2 minutes during which she was also not able to speak.  When this occurred she went to [facility]. At [facility] she experienced a second similar event and was diagnosed with stroke on CT and resultant seizure and she was started on levetiracetam. She was transferred to [facility] where she underwent carotid stenting. During her hospitalization at [facility], Ms. MCGOWAN had recurrent episodes of twitching in L arm and Keppra dose was increased to 1000mg q12.  Ms. MCGOWAN has continued on levetiracetam and has not had recurrence of focal motor seizures or any other event suspicious for seizures.  Her use of the R arm has improved over time, but her left hand is still impaired. \par \par *** from Stroke NP 08/14/2019 ***\par Ms. Mcgowan is a 53 year old female PMHx Anxiety, 40 pk/yr smoking hx (active time of stroke), does not get regular checkups who presents today for post hospitalization follow up after a right MCA artery ischemic infarct on 6/20/19. She is s/p right internal carotid stent stent place on 6/21/19 with Dr. Barrios.\par \par HPI:\par 52y/o F RH  PMH Anxiety, 40 pk/yr smoking hx(still active), does not get regular checkups presented to [facility] with left arm numbness, spasms starting 20:00 on 6/19. A phone consult was performed with [facility] and during that time she had some worsening of her neurological status but left arm weakness and spasms. At that time she was given Ativan for possible seizure. CT noncontrast and CT angiogram of head and neck revealed right frontal hypodensity suggestive of right MCA infarction in the setting of bilateral severe carotid stenosis. She was then transferred to [facility], on exam NIHSS was 6, alert, awake however unable to move her left arm. She was also noticed to have continuous twitching of left arm and face, drop in oxygen saturation for which she was put on BiPAP.\par She is s/p right internal carotid stent stent place on 6/21/19 with Dr. Barrios. Carotid dopplers performed: unimpeded antegrade flow through the stented right ICA, severe, >70% stenosis of the left ICA, flow limiting stenosis of b/l ECA ,\par On 6/20 AM she was noted to have jerking movements of the left hand to LUE with subsequent fascial twitching and resolved with 2mg of IV Ativan, 1g of Keppra. VEEG with no epileptiform abnormalities or seizures recorded. Continue Keppra 1g BID for seizure ppx, maintain seizure precautions.

## 2019-09-16 NOTE — PHYSICAL EXAM
[General Appearance - Alert] : alert [Person] : oriented to person [General Appearance - In No Acute Distress] : in no acute distress [Place] : oriented to place [Time] : oriented to time [Motor Strength] : muscle strength was normal in all four extremities [Involuntary Movements] : no involuntary movements were seen [] : no respiratory distress [Abnormal Walk] : normal gait

## 2019-09-16 NOTE — RESULTS/DATA
[FreeTextEntry1] : EXAM: CAROTID DUPLEX BILATERAL \par \par \par PROCEDURE DATE: 06/21/2019 \par \par \par \par \par INTERPRETATION: Technique: Grayscale, color and spectral Doppler \par examination of both carotid arteries was performed. \par \par HISTORY: Stented right internal carotid artery stenosis, follow-up \par examination. \par \par There is unimpeded flow through a patent stent extending from the distal \par common carotid artery into the right internal carotid artery. \par \par Prominent atheromatous plaque is present at the nonstented bifurcation of \par the left common carotid artery into internal and external branches. There is \par turbulent flow through the proximal left internal and external branches. \par \par Blood flow velocities are as follows: \par \par RIGHT: PROX CCA = 70 ; DIST CCA = 113 ; PROX ICA = 52 ; DIST ICA = 50 \par ; ECA = 200 \par \par LEFT : PROX CCA = 85 ; DIST CCA = 99 ; PROX ICA = 349/92 ; DIST ICA \par = 77 ; ECA = 329 \par \par There is antegrade flow through both vertebral arteries. \par \par IMPRESSION: There is unimpeded antegrade flow through the stented right \par internal carotid artery. \par There is a severe, greater than 70% stenosis of the left internal carotid \par artery. \par There are flow-limiting stenoses of both the right and left external carotid \par arteries. \par \par Measurement of carotid stenosis is based on velocity parameters that \par correlate the residual internal carotid diameter with that of the more \par distal vessel in accordance with a method such as the [place] American \par Symptomatic Carotid Endarterectomy Trial (NASCET). \par \par \par \par \par \par \par \par \par \par \par \par \par \par AJIT GAUTAM M.D., ATTENDING RADIOLOGIST \par This document has been electronically signed. Jun 24 2019 10:47AM \par

## 2019-09-23 PROCEDURE — 93306 TTE W/DOPPLER COMPLETE: CPT | Mod: 26

## 2019-09-25 ENCOUNTER — APPOINTMENT (OUTPATIENT)
Dept: CARDIOTHORACIC SURGERY | Facility: CLINIC | Age: 54
End: 2019-09-25
Payer: COMMERCIAL

## 2019-09-25 VITALS
OXYGEN SATURATION: 95 % | HEART RATE: 64 BPM | HEIGHT: 68 IN | DIASTOLIC BLOOD PRESSURE: 102 MMHG | RESPIRATION RATE: 13 BRPM | WEIGHT: 229 LBS | SYSTOLIC BLOOD PRESSURE: 182 MMHG | BODY MASS INDEX: 34.71 KG/M2

## 2019-09-25 PROCEDURE — 99213 OFFICE O/P EST LOW 20 MIN: CPT

## 2019-09-25 PROCEDURE — 93306 TTE W/DOPPLER COMPLETE: CPT

## 2019-09-25 PROCEDURE — 93306 TTE W/DOPPLER COMPLETE: CPT | Mod: 26

## 2019-09-25 NOTE — HISTORY OF PRESENT ILLNESS
[FreeTextEntry1] : Mariana is a 53 year old female with PMH of obesity, HLD, HTN, Anxiety, former smoker (recently quit), hospitalized for acute right MCA infarct (June 2019) in the setting of bilateral severe carotid stenosis. She underwent a SANDIE stent place on 6/21/19 with Dr. Barrios. She presented to OSH with left upper extremity weakness and subsequent seizure activity.  TTE demonstrated severely dilated left atrium, severe diastolic dysfunction and reduced EF (30-35%) and cardiac Catherization showed triple vessel disease. \par \par She presents for follow up visit to discuss her surgical plan. She denies chest pain, shortness of breath, dyspnea on exertion, palpitations, orthopnea, paroxysmal nocturnal dyspnea, claudication, dizziness, lightheadedness, presyncopal, or syncopal symptoms.\par

## 2019-09-25 NOTE — ASSESSMENT
[FreeTextEntry1] : Mariana is a 53 year old female with PMH of obesity, HLD, HTN, Anxiety, current smoker (recently quit), does not get regular checkups who was recently hospitalized for acute right MCA infarct in the setting of bilateral severe carotid stenosis. She presented to OSH with left upper extremity weakness and subsequent seizure activity. CTA Head and Neck demonstrated severe stenosis at the origin of the right internal carotid artery with filling defect in the anterior cavernous segment suspicious for critical stenosis/focal occlusion. Moderate to severe stenosis of the proximal cervical segment and mild stenosis of the posterior cavernous segment of the left internal carotid artery. Neurology consulted and she underwent a.SANDIE stent place on 6/21/19 with Dr. Barrios. She presents today for follow up. She denies any episodes of seizures and has followed up with neurology. \par \par Imaging results and medical records were reviewed at length.  TTE in office today revealed normal LV function.  She followed with [facility] for her high grade asymptomatic carotid disease. I would recommend her undergoing the carotid intervention then follow up for coronary revascularization. She does require four bypasses when reviewing cardiac Catherization. She will require to be off Plavix for 10 days and remain on low dose ASA before planned CABG.

## 2019-09-25 NOTE — CONSULT LETTER
[Dear  ___] : Dear ~KIMBERLY, [Courtesy Letter:] : I had the pleasure of seeing your patient, [unfilled], in my office today. [Please see my note below.] : Please see my note below. [Sincerely,] : Sincerely, [Consult Closing:] : Thank you very much for allowing me to participate in the care of this patient.  If you have any questions, please do not hesitate to contact me. [FreeTextEntry2] : Lito Moise MD \par [address] [place] [address] \[place] [address] [FreeTextEntry1] : She returns 3 myhs s/p Carotid stenting for an acute R cerebral infact.  She had a cath at the time with severe TVD and morgan decreased LV function.  However Now she had a TTE with normal RV and LV function and no valvular disease.  She has no symproms referable to her CAD.  She has a high grade L carotid stenosis as wll.  She is asymptomatic.  I will discuss with DR. Barrios the timign of surgery vs stenting of the L carotid artery. That would entail 3 more mths of Plavix for this stent. [FreeTextEntry3] : Bonifacio Rivera MD, FACS\par Attending Surgeon \par Cardiovascular & Thoracic Surgery\par  \par [employer]\par

## 2019-09-25 NOTE — PHYSICAL EXAM
[Sclera] : the sclera and conjunctiva were normal [PERRL With Normal Accommodation] : pupils were equal in size, round, and reactive to light [Extraocular Movements] : extraocular movements were intact [Neck Appearance] : the appearance of the neck was normal [Neck Cervical Mass (___cm)] : no neck mass was observed [Jugular Venous Distention Increased] : there was no jugular-venous distention [Thyroid Diffuse Enlargement] : the thyroid was not enlarged [Thyroid Nodule] : there were no palpable thyroid nodules [] : no respiratory distress [Auscultation Breath Sounds / Voice Sounds] : lungs were clear to auscultation bilaterally [Heart Rate And Rhythm] : heart rate was normal and rhythm regular [Examination Of The Chest] : the chest was normal in appearance [Edema] : there was no peripheral edema [Breast Appearance] : normal in appearance [Bowel Sounds] : normal bowel sounds [Abdomen Soft] : soft [Cervical Lymph Nodes Enlarged Posterior Bilaterally] : posterior cervical [Cervical Lymph Nodes Enlarged Anterior Bilaterally] : anterior cervical [Involuntary Movements] : no involuntary movements were seen [No CVA Tenderness] : no ~M costovertebral angle tenderness [Skin Color & Pigmentation] : normal skin color and pigmentation [No Focal Deficits] : no focal deficits [Oriented To Time, Place, And Person] : oriented to person, place, and time [Impaired Insight] : insight and judgment were intact [FreeTextEntry1] : deferred

## 2019-10-03 ENCOUNTER — OUTPATIENT (OUTPATIENT)
Dept: OUTPATIENT SERVICES | Facility: HOSPITAL | Age: 54
LOS: 1 days | End: 2019-10-03
Payer: COMMERCIAL

## 2019-10-03 VITALS
WEIGHT: 220.02 LBS | RESPIRATION RATE: 18 BRPM | TEMPERATURE: 98 F | DIASTOLIC BLOOD PRESSURE: 96 MMHG | HEART RATE: 58 BPM | SYSTOLIC BLOOD PRESSURE: 172 MMHG | OXYGEN SATURATION: 98 % | HEIGHT: 68 IN

## 2019-10-03 DIAGNOSIS — I10 ESSENTIAL (PRIMARY) HYPERTENSION: ICD-10-CM

## 2019-10-03 DIAGNOSIS — Z86.73 PERSONAL HISTORY OF TRANSIENT ISCHEMIC ATTACK (TIA), AND CEREBRAL INFARCTION WITHOUT RESIDUAL DEFICITS: Chronic | ICD-10-CM

## 2019-10-03 DIAGNOSIS — Z29.9 ENCOUNTER FOR PROPHYLACTIC MEASURES, UNSPECIFIED: ICD-10-CM

## 2019-10-03 DIAGNOSIS — Z01.818 ENCOUNTER FOR OTHER PREPROCEDURAL EXAMINATION: ICD-10-CM

## 2019-10-03 DIAGNOSIS — I25.10 ATHEROSCLEROTIC HEART DISEASE OF NATIVE CORONARY ARTERY WITHOUT ANGINA PECTORIS: ICD-10-CM

## 2019-10-03 DIAGNOSIS — Z86.73 PERSONAL HISTORY OF TRANSIENT ISCHEMIC ATTACK (TIA), AND CEREBRAL INFARCTION WITHOUT RESIDUAL DEFICITS: ICD-10-CM

## 2019-10-03 LAB
ALBUMIN SERPL ELPH-MCNC: 4.5 G/DL — SIGNIFICANT CHANGE UP (ref 3.3–5)
ALP SERPL-CCNC: 70 U/L — SIGNIFICANT CHANGE UP (ref 40–120)
ALT FLD-CCNC: 43 U/L — SIGNIFICANT CHANGE UP (ref 10–45)
ANION GAP SERPL CALC-SCNC: 13 MMOL/L — SIGNIFICANT CHANGE UP (ref 5–17)
AST SERPL-CCNC: 28 U/L — SIGNIFICANT CHANGE UP (ref 10–40)
BILIRUB SERPL-MCNC: 1.2 MG/DL — SIGNIFICANT CHANGE UP (ref 0.2–1.2)
BLD GP AB SCN SERPL QL: NEGATIVE — SIGNIFICANT CHANGE UP
BUN SERPL-MCNC: 24 MG/DL — HIGH (ref 7–23)
CALCIUM SERPL-MCNC: 9.3 MG/DL — SIGNIFICANT CHANGE UP (ref 8.4–10.5)
CHLORIDE SERPL-SCNC: 102 MMOL/L — SIGNIFICANT CHANGE UP (ref 96–108)
CO2 SERPL-SCNC: 23 MMOL/L — SIGNIFICANT CHANGE UP (ref 22–31)
CREAT SERPL-MCNC: 0.88 MG/DL — SIGNIFICANT CHANGE UP (ref 0.5–1.3)
GLUCOSE SERPL-MCNC: 98 MG/DL — SIGNIFICANT CHANGE UP (ref 70–99)
HBA1C BLD-MCNC: 6 % — HIGH (ref 4–5.6)
HCT VFR BLD CALC: 42.6 % — SIGNIFICANT CHANGE UP (ref 34.5–45)
HGB BLD-MCNC: 13.3 G/DL — SIGNIFICANT CHANGE UP (ref 11.5–15.5)
MCHC RBC-ENTMCNC: 23.5 PG — LOW (ref 27–34)
MCHC RBC-ENTMCNC: 31.2 GM/DL — LOW (ref 32–36)
MCV RBC AUTO: 75.3 FL — LOW (ref 80–100)
PA ADP PRP-ACNC: 226 PRU — SIGNIFICANT CHANGE UP (ref 194–417)
PLATELET # BLD AUTO: 401 K/UL — HIGH (ref 150–400)
POTASSIUM SERPL-MCNC: 4.3 MMOL/L — SIGNIFICANT CHANGE UP (ref 3.5–5.3)
POTASSIUM SERPL-SCNC: 4.3 MMOL/L — SIGNIFICANT CHANGE UP (ref 3.5–5.3)
PROT SERPL-MCNC: 7.5 G/DL — SIGNIFICANT CHANGE UP (ref 6–8.3)
RBC # BLD: 5.66 M/UL — HIGH (ref 3.8–5.2)
RBC # FLD: 20.5 % — HIGH (ref 10.3–14.5)
RH IG SCN BLD-IMP: POSITIVE — SIGNIFICANT CHANGE UP
SODIUM SERPL-SCNC: 138 MMOL/L — SIGNIFICANT CHANGE UP (ref 135–145)
WBC # BLD: 7.85 K/UL — SIGNIFICANT CHANGE UP (ref 3.8–10.5)
WBC # FLD AUTO: 7.85 K/UL — SIGNIFICANT CHANGE UP (ref 3.8–10.5)

## 2019-10-03 PROCEDURE — 86850 RBC ANTIBODY SCREEN: CPT

## 2019-10-03 PROCEDURE — 87641 MR-STAPH DNA AMP PROBE: CPT

## 2019-10-03 PROCEDURE — 93880 EXTRACRANIAL BILAT STUDY: CPT | Mod: 26

## 2019-10-03 PROCEDURE — 85027 COMPLETE CBC AUTOMATED: CPT

## 2019-10-03 PROCEDURE — 86901 BLOOD TYPING SEROLOGIC RH(D): CPT

## 2019-10-03 PROCEDURE — G0463: CPT

## 2019-10-03 PROCEDURE — 86900 BLOOD TYPING SEROLOGIC ABO: CPT

## 2019-10-03 PROCEDURE — 87640 STAPH A DNA AMP PROBE: CPT

## 2019-10-03 PROCEDURE — 83036 HEMOGLOBIN GLYCOSYLATED A1C: CPT

## 2019-10-03 PROCEDURE — 80053 COMPREHEN METABOLIC PANEL: CPT

## 2019-10-03 PROCEDURE — 71046 X-RAY EXAM CHEST 2 VIEWS: CPT

## 2019-10-03 PROCEDURE — 71046 X-RAY EXAM CHEST 2 VIEWS: CPT | Mod: 26

## 2019-10-03 PROCEDURE — 86923 COMPATIBILITY TEST ELECTRIC: CPT

## 2019-10-03 PROCEDURE — 93880 EXTRACRANIAL BILAT STUDY: CPT

## 2019-10-03 PROCEDURE — 85576 BLOOD PLATELET AGGREGATION: CPT

## 2019-10-03 NOTE — H&P PST ADULT - NSICDXPASTMEDICALHX_GEN_ALL_CORE_FT
PAST MEDICAL HISTORY:  Anxiety     Atherosclerotic heart disease of native coronary artery without angina pectoris     CVA (cerebrovascular accident) Ischemic Right MCA stroke    Hyperlipemia     Hypertension     Seizure

## 2019-10-03 NOTE — H&P PST ADULT - HISTORY OF PRESENT ILLNESS
53yr old female presents to PST for a CABGx3 on 10/8/2019. TTE demonstrated severely dilated left atrium, severe diastolic dysfunction and cardiac cath showed triple vessel disease. Pt w/ hx of Anxiety, Hypertension, Hyperlipidemia, Smoking (1ppd >30 yrs; quit in 6/2019), NIMESH? (was told "fit criteria"- has lost 40 pounds since; no formal sleep study done as of yet) and recent Right MCA infarct (6/19) s/p SANDIE stent w/ Dr. Barrios. Plavix stopped on 9/25/2019, on daily Aspirin 81mg. No residual as per pt; some occasional tingling to left hand but has full strength & sensation. Pt denies N&V, headaches, chest pain or SOB and feels well otherwise.

## 2019-10-03 NOTE — H&P PST ADULT - NEUROLOGICAL DETAILS
sensation intact/deep reflexes intact/no spontaneous movement/alert and oriented x 3/normal strength/responds to verbal commands

## 2019-10-03 NOTE — H&P PST ADULT - ASSESSMENT
CAPRINI SCORE [CLOT updated 18]    AGE RELATED RISK FACTORS                                                       MOBILITY RELATED FACTORS  [ ] Age 41-60 years                                            (1 Point)                    [ ] Bed rest                                                        (1 Point)  [ ] Age: 61-74 years                                           (2 Points)                  [ ] Plaster cast                                                   (2 Points)  [ ] Age= 75 years                                              (3 Points)                    [ ] Bed bound for more than 72 hours                 (2 Points)    DISEASE RELATED RISK FACTORS                                               GENDER SPECIFIC FACTORS  [ ] Edema in the lower extremities                       (1 Point)              [ ] Pregnancy                                                     (1 Point)  [ ] Varicose veins                                               (1 Point)                     [ ] Post-partum < 6 weeks                                   (1 Point)             [ ] BMI > 25 Kg/m2                                            (1 Point)                     [ ] Hormonal therapy  or oral contraception          (1 Point)                 [ ] Sepsis (in the previous month)                        (1 Point)               [ ] History of pregnancy complications                 (1 point)  [ ] Pneumonia or serious lung disease                                               [ ] Unexplained or recurrent                     (1 Point)           (in the previous month)                               (1 Point)  [ ] Abnormal pulmonary function test                     (1 Point)                 SURGERY RELATED RISK FACTORS  [ ] Acute myocardial infarction                              (1 Point)               [ ]  Section                                             (1 Point)  [ ] Congestive heart failure (in the previous month)  (1 Point)      [ ] Minor surgery                                                  (1 Point)   [ ] Inflammatory bowel disease                             (1 Point)               [ ] Arthroscopic surgery                                        (2 Points)  [ ] Central venous access                                      (2 Points)                [ ] General surgery lasting more than 45 minutes (2 points)  [ ] Present or previous malignancy                     (2 Points)                [ ] Elective arthroplasty                                         (5 points)    [ ] Stroke (in the previous month)                          (5 Points)                                                                                                                                                           HEMATOLOGY RELATED FACTORS                                                 TRAUMA RELATED RISK FACTORS  [ ] Prior episodes of VTE                                     (3 Points)                [ ] Fracture of the hip, pelvis, or leg                       (5 Points)  [ ] Positive family history for VTE                         (3 Points)             [ ] Acute spinal cord injury (in the previous month)  (5 Points)  [ ] Prothrombin 33047 A                                     (3 Points)               [ ] Paralysis  (less than 1 month)                             (5 Points)  [ ] Factor V Leiden                                             (3 Points)                  [ ] Multiple Trauma within 1 month                        (5 Points)  [ ] Lupus anticoagulants                                     (3 Points)                                                           [ ] Anticardiolipin antibodies                               (3 Points)                                                       [ ] High homocysteine in the blood                      (3 Points)                                             [ ] Other congenital or acquired thrombophilia      (3 Points)                                                [ ] Heparin induced thrombocytopenia                  (3 Points)                                     Total Score [ 9   ]

## 2019-10-03 NOTE — H&P PST ADULT - RS GEN PE MLT RESP DETAILS PC
normal/respirations non-labored/breath sounds equal/clear to auscultation bilaterally/good air movement

## 2019-10-03 NOTE — H&P PST ADULT - NSICDXPROBLEM_GEN_ALL_CORE_FT
PROBLEM DIAGNOSES  Problem: Atherosclerotic heart disease of native coronary artery without angina pectoris  Assessment and Plan: Pt scheduled for sx on 10/8/19. Surgical, chlorhexidine and incentive spirometry instructions reviewed w/ pt and spouse.     Problem: Hypertension  Assessment and Plan: Pt to continue oral medication as prescribed.    Problem: History of ischemic right MCA stroke  Assessment and Plan: Plavix stopped on 9/25/2019, pt to continue on daily Asprin 81mg.     Problem: Need for prophylactic measure  Assessment and Plan: The Caprini score indicates that this patient is at high risk for a VTE event (score 6 or greater). Surgical patients in this group will benefit from both pharmacologic prophylaxis and intermittent compression devices.  The surgical team will determine the balance between VTE risk and bleeding risk, and other clinical considerations

## 2019-10-03 NOTE — H&P PST ADULT - NEGATIVE NEUROLOGICAL SYMPTOMS
no tremors/no weakness/no generalized seizures/no focal seizures/no vertigo/no loss of sensation/no syncope/no difficulty walking

## 2019-10-03 NOTE — H&P PST ADULT - NSANTHOSAYNRD_GEN_A_CORE
No. NIMESH screening performed.  STOP BANG Legend: 0-2 = LOW Risk; 3-4 = INTERMEDIATE Risk; 5-8 = HIGH Risk

## 2019-10-04 LAB
MRSA PCR RESULT.: SIGNIFICANT CHANGE UP
S AUREUS DNA NOSE QL NAA+PROBE: SIGNIFICANT CHANGE UP

## 2019-10-09 ENCOUNTER — TRANSCRIPTION ENCOUNTER (OUTPATIENT)
Age: 54
End: 2019-10-09

## 2019-10-10 ENCOUNTER — INPATIENT (INPATIENT)
Facility: HOSPITAL | Age: 54
LOS: 4 days | Discharge: HOME | DRG: 236 | End: 2019-10-15
Attending: THORACIC SURGERY (CARDIOTHORACIC VASCULAR SURGERY) | Admitting: THORACIC SURGERY (CARDIOTHORACIC VASCULAR SURGERY)
Payer: COMMERCIAL

## 2019-10-10 ENCOUNTER — APPOINTMENT (OUTPATIENT)
Dept: CARDIOTHORACIC SURGERY | Facility: HOSPITAL | Age: 54
End: 2019-10-10

## 2019-10-10 VITALS
WEIGHT: 223.99 LBS | HEIGHT: 68 IN | SYSTOLIC BLOOD PRESSURE: 162 MMHG | TEMPERATURE: 98 F | OXYGEN SATURATION: 97 % | DIASTOLIC BLOOD PRESSURE: 86 MMHG | HEART RATE: 73 BPM | RESPIRATION RATE: 18 BRPM

## 2019-10-10 DIAGNOSIS — I25.10 ATHEROSCLEROTIC HEART DISEASE OF NATIVE CORONARY ARTERY WITHOUT ANGINA PECTORIS: ICD-10-CM

## 2019-10-10 DIAGNOSIS — Z86.73 PERSONAL HISTORY OF TRANSIENT ISCHEMIC ATTACK (TIA), AND CEREBRAL INFARCTION WITHOUT RESIDUAL DEFICITS: Chronic | ICD-10-CM

## 2019-10-10 PROBLEM — R56.9 UNSPECIFIED CONVULSIONS: Chronic | Status: ACTIVE | Noted: 2019-10-03

## 2019-10-10 PROBLEM — I10 ESSENTIAL (PRIMARY) HYPERTENSION: Chronic | Status: ACTIVE | Noted: 2019-10-03

## 2019-10-10 PROBLEM — I63.9 CEREBRAL INFARCTION, UNSPECIFIED: Chronic | Status: ACTIVE | Noted: 2019-10-03

## 2019-10-10 PROBLEM — E78.5 HYPERLIPIDEMIA, UNSPECIFIED: Chronic | Status: ACTIVE | Noted: 2019-10-03

## 2019-10-10 LAB
ALBUMIN SERPL ELPH-MCNC: 3.2 G/DL — LOW (ref 3.3–5)
ALP SERPL-CCNC: 52 U/L — SIGNIFICANT CHANGE UP (ref 40–120)
ALT FLD-CCNC: 38 U/L — SIGNIFICANT CHANGE UP (ref 10–45)
ANION GAP SERPL CALC-SCNC: 11 MMOL/L — SIGNIFICANT CHANGE UP (ref 5–17)
ANISOCYTOSIS BLD QL: SIGNIFICANT CHANGE UP
APTT BLD: 26.7 SEC — LOW (ref 27.5–36.3)
AST SERPL-CCNC: 49 U/L — HIGH (ref 10–40)
BASE EXCESS BLDV CALC-SCNC: -4 MMOL/L — LOW (ref -2–2)
BASE EXCESS BLDV CALC-SCNC: 0.7 MMOL/L — SIGNIFICANT CHANGE UP (ref -2–2)
BASE EXCESS BLDV CALC-SCNC: 1.4 MMOL/L — SIGNIFICANT CHANGE UP (ref -2–2)
BASE EXCESS BLDV CALC-SCNC: 1.5 MMOL/L — SIGNIFICANT CHANGE UP (ref -2–2)
BASE EXCESS BLDV CALC-SCNC: 1.8 MMOL/L — SIGNIFICANT CHANGE UP (ref -2–2)
BASOPHILS # BLD AUTO: 0 K/UL — SIGNIFICANT CHANGE UP (ref 0–0.2)
BASOPHILS NFR BLD AUTO: 0 % — SIGNIFICANT CHANGE UP (ref 0–2)
BILIRUB SERPL-MCNC: 2.6 MG/DL — HIGH (ref 0.2–1.2)
BLOOD GAS VENOUS - CREATININE: SIGNIFICANT CHANGE UP MG/DL (ref 0.5–1.3)
BUN SERPL-MCNC: 16 MG/DL — SIGNIFICANT CHANGE UP (ref 7–23)
BURR CELLS BLD QL SMEAR: PRESENT — SIGNIFICANT CHANGE UP
CA-I SERPL-SCNC: 0.83 MMOL/L — LOW (ref 1.12–1.3)
CA-I SERPL-SCNC: 0.87 MMOL/L — LOW (ref 1.12–1.3)
CA-I SERPL-SCNC: 1.07 MMOL/L — LOW (ref 1.12–1.3)
CA-I SERPL-SCNC: 1.08 MMOL/L — LOW (ref 1.12–1.3)
CA-I SERPL-SCNC: 1.08 MMOL/L — LOW (ref 1.12–1.3)
CALCIUM SERPL-MCNC: 9.3 MG/DL — SIGNIFICANT CHANGE UP (ref 8.4–10.5)
CHLORIDE BLDV-SCNC: SIGNIFICANT CHANGE UP MMOL/L (ref 96–108)
CHLORIDE SERPL-SCNC: 105 MMOL/L — SIGNIFICANT CHANGE UP (ref 96–108)
CK MB BLD-MCNC: 7.8 % — HIGH (ref 0–3.5)
CK MB CFR SERPL CALC: 21.1 NG/ML — HIGH (ref 0–3.8)
CK SERPL-CCNC: 271 U/L — HIGH (ref 25–170)
CO2 SERPL-SCNC: 22 MMOL/L — SIGNIFICANT CHANGE UP (ref 22–31)
CREAT SERPL-MCNC: 0.72 MG/DL — SIGNIFICANT CHANGE UP (ref 0.5–1.3)
DACRYOCYTES BLD QL SMEAR: SLIGHT — SIGNIFICANT CHANGE UP
ELLIPTOCYTES BLD QL SMEAR: SLIGHT — SIGNIFICANT CHANGE UP
EOSINOPHIL # BLD AUTO: 0.26 K/UL — SIGNIFICANT CHANGE UP (ref 0–0.5)
EOSINOPHIL NFR BLD AUTO: 1.7 % — SIGNIFICANT CHANGE UP (ref 0–6)
FIBRINOGEN PPP-MCNC: 322 MG/DL — LOW (ref 350–510)
GAS PNL BLDA: SIGNIFICANT CHANGE UP
GAS PNL BLDV: 134 MMOL/L — LOW (ref 135–145)
GAS PNL BLDV: 134 MMOL/L — LOW (ref 135–145)
GAS PNL BLDV: 136 MMOL/L — SIGNIFICANT CHANGE UP (ref 135–145)
GAS PNL BLDV: 137 MMOL/L — SIGNIFICANT CHANGE UP (ref 135–145)
GAS PNL BLDV: 138 MMOL/L — SIGNIFICANT CHANGE UP (ref 135–145)
GAS PNL BLDV: SIGNIFICANT CHANGE UP
GLUCOSE BLDC GLUCOMTR-MCNC: 104 MG/DL — HIGH (ref 70–99)
GLUCOSE BLDC GLUCOMTR-MCNC: 125 MG/DL — HIGH (ref 70–99)
GLUCOSE BLDV-MCNC: 118 MG/DL — HIGH (ref 70–99)
GLUCOSE BLDV-MCNC: 163 MG/DL — HIGH (ref 70–99)
GLUCOSE BLDV-MCNC: 204 MG/DL — HIGH (ref 70–99)
GLUCOSE BLDV-MCNC: 217 MG/DL — HIGH (ref 70–99)
GLUCOSE BLDV-MCNC: 91 MG/DL — SIGNIFICANT CHANGE UP (ref 70–99)
GLUCOSE SERPL-MCNC: 137 MG/DL — HIGH (ref 70–99)
HCG UR QL: NEGATIVE — SIGNIFICANT CHANGE UP
HCO3 BLDV-SCNC: 21 MMOL/L — SIGNIFICANT CHANGE UP (ref 21–29)
HCO3 BLDV-SCNC: 26 MMOL/L — SIGNIFICANT CHANGE UP (ref 21–29)
HCO3 BLDV-SCNC: 27 MMOL/L — SIGNIFICANT CHANGE UP (ref 21–29)
HCO3 BLDV-SCNC: 27 MMOL/L — SIGNIFICANT CHANGE UP (ref 21–29)
HCO3 BLDV-SCNC: 28 MMOL/L — SIGNIFICANT CHANGE UP (ref 21–29)
HCT VFR BLD CALC: 36.8 % — SIGNIFICANT CHANGE UP (ref 34.5–45)
HCT VFR BLDA CALC: 25 % — LOW (ref 39–50)
HCT VFR BLDA CALC: 27 % — LOW (ref 39–50)
HCT VFR BLDA CALC: 28 % — LOW (ref 39–50)
HCT VFR BLDA CALC: 28 % — LOW (ref 39–50)
HCT VFR BLDA CALC: 29 % — LOW (ref 39–50)
HGB BLD CALC-MCNC: 8 G/DL — LOW (ref 11.5–15.5)
HGB BLD CALC-MCNC: 8.8 G/DL — LOW (ref 11.5–15.5)
HGB BLD CALC-MCNC: 9 G/DL — LOW (ref 11.5–15.5)
HGB BLD CALC-MCNC: 9 G/DL — LOW (ref 11.5–15.5)
HGB BLD CALC-MCNC: 9.3 G/DL — LOW (ref 11.5–15.5)
HGB BLD-MCNC: 12 G/DL — SIGNIFICANT CHANGE UP (ref 11.5–15.5)
HOROWITZ INDEX BLDV+IHG-RTO: 0 — SIGNIFICANT CHANGE UP
HYPOCHROMIA BLD QL: SLIGHT — SIGNIFICANT CHANGE UP
INR BLD: 1.34 RATIO — HIGH (ref 0.88–1.16)
LACTATE BLDV-MCNC: 0.7 MMOL/L — SIGNIFICANT CHANGE UP (ref 0.7–2)
LACTATE BLDV-MCNC: 0.7 MMOL/L — SIGNIFICANT CHANGE UP (ref 0.7–2)
LACTATE BLDV-MCNC: 0.8 MMOL/L — SIGNIFICANT CHANGE UP (ref 0.7–2)
LACTATE BLDV-MCNC: 0.9 MMOL/L — SIGNIFICANT CHANGE UP (ref 0.7–2)
LACTATE BLDV-MCNC: 1 MMOL/L — SIGNIFICANT CHANGE UP (ref 0.7–2)
LYMPHOCYTES # BLD AUTO: 0.67 K/UL — LOW (ref 1–3.3)
LYMPHOCYTES # BLD AUTO: 4.4 % — LOW (ref 13–44)
MACROCYTES BLD QL: SLIGHT — SIGNIFICANT CHANGE UP
MANUAL SMEAR VERIFICATION: SIGNIFICANT CHANGE UP
MCHC RBC-ENTMCNC: 24.4 PG — LOW (ref 27–34)
MCHC RBC-ENTMCNC: 32.6 GM/DL — SIGNIFICANT CHANGE UP (ref 32–36)
MCV RBC AUTO: 74.8 FL — LOW (ref 80–100)
MICROCYTES BLD QL: SIGNIFICANT CHANGE UP
MONOCYTES # BLD AUTO: 0.4 K/UL — SIGNIFICANT CHANGE UP (ref 0–0.9)
MONOCYTES NFR BLD AUTO: 2.6 % — SIGNIFICANT CHANGE UP (ref 2–14)
NEUTROPHILS # BLD AUTO: 13.42 K/UL — HIGH (ref 1.8–7.4)
NEUTROPHILS NFR BLD AUTO: 82.5 % — HIGH (ref 43–77)
NEUTS BAND # BLD: 5.3 % — SIGNIFICANT CHANGE UP (ref 0–8)
OVALOCYTES BLD QL SMEAR: SLIGHT — SIGNIFICANT CHANGE UP
PCO2 BLDV: 41 MMHG — SIGNIFICANT CHANGE UP (ref 35–50)
PCO2 BLDV: 45 MMHG — SIGNIFICANT CHANGE UP (ref 35–50)
PCO2 BLDV: 53 MMHG — HIGH (ref 35–50)
PCO2 BLDV: 53 MMHG — HIGH (ref 35–50)
PCO2 BLDV: 55 MMHG — HIGH (ref 35–50)
PH BLDV: 7.31 — LOW (ref 7.35–7.45)
PH BLDV: 7.33 — LOW (ref 7.35–7.45)
PH BLDV: 7.39 — SIGNIFICANT CHANGE UP (ref 7.35–7.45)
PLAT MORPH BLD: NORMAL — SIGNIFICANT CHANGE UP
PLATELET # BLD AUTO: 217 K/UL — SIGNIFICANT CHANGE UP (ref 150–400)
PO2 BLDV: 106 MMHG — HIGH (ref 25–45)
PO2 BLDV: 57 MMHG — HIGH (ref 25–45)
PO2 BLDV: 59 MMHG — HIGH (ref 25–45)
PO2 BLDV: 61 MMHG — HIGH (ref 25–45)
PO2 BLDV: 77 MMHG — HIGH (ref 25–45)
POIKILOCYTOSIS BLD QL AUTO: SIGNIFICANT CHANGE UP
POLYCHROMASIA BLD QL SMEAR: SLIGHT — SIGNIFICANT CHANGE UP
POTASSIUM BLDV-SCNC: 4 MMOL/L — SIGNIFICANT CHANGE UP (ref 3.5–5)
POTASSIUM BLDV-SCNC: 4.5 MMOL/L — SIGNIFICANT CHANGE UP (ref 3.5–5)
POTASSIUM BLDV-SCNC: 4.7 MMOL/L — SIGNIFICANT CHANGE UP (ref 3.5–5)
POTASSIUM BLDV-SCNC: 4.8 MMOL/L — SIGNIFICANT CHANGE UP (ref 3.5–5)
POTASSIUM BLDV-SCNC: 5.8 MMOL/L — HIGH (ref 3.5–5)
POTASSIUM SERPL-MCNC: 4 MMOL/L — SIGNIFICANT CHANGE UP (ref 3.5–5.3)
POTASSIUM SERPL-SCNC: 4 MMOL/L — SIGNIFICANT CHANGE UP (ref 3.5–5.3)
PROT SERPL-MCNC: 5.1 G/DL — LOW (ref 6–8.3)
PROTHROM AB SERPL-ACNC: 15.6 SEC — HIGH (ref 10–12.9)
RBC # BLD: 4.92 M/UL — SIGNIFICANT CHANGE UP (ref 3.8–5.2)
RBC # FLD: 19.1 % — HIGH (ref 10.3–14.5)
RBC BLD AUTO: ABNORMAL
SAO2 % BLDV: 83 % — SIGNIFICANT CHANGE UP (ref 67–88)
SAO2 % BLDV: 85 % — SIGNIFICANT CHANGE UP (ref 67–88)
SAO2 % BLDV: 87 % — SIGNIFICANT CHANGE UP (ref 67–88)
SAO2 % BLDV: 93 % — HIGH (ref 67–88)
SAO2 % BLDV: 97 % — HIGH (ref 67–88)
SCHISTOCYTES BLD QL AUTO: SLIGHT — SIGNIFICANT CHANGE UP
SMUDGE CELLS # BLD: PRESENT — SIGNIFICANT CHANGE UP
SODIUM SERPL-SCNC: 138 MMOL/L — SIGNIFICANT CHANGE UP (ref 135–145)
TROPONIN T, HIGH SENSITIVITY RESULT: 290 NG/L — HIGH (ref 0–51)
VARIANT LYMPHS # BLD: 3.5 % — SIGNIFICANT CHANGE UP (ref 0–6)
WBC # BLD: 15.28 K/UL — HIGH (ref 3.8–10.5)
WBC # FLD AUTO: 15.28 K/UL — HIGH (ref 3.8–10.5)

## 2019-10-10 PROCEDURE — 33519 CABG ARTERY-VEIN THREE: CPT

## 2019-10-10 PROCEDURE — 93010 ELECTROCARDIOGRAM REPORT: CPT

## 2019-10-10 PROCEDURE — 99291 CRITICAL CARE FIRST HOUR: CPT

## 2019-10-10 PROCEDURE — 33533 CABG ARTERIAL SINGLE: CPT

## 2019-10-10 PROCEDURE — 71045 X-RAY EXAM CHEST 1 VIEW: CPT | Mod: 26

## 2019-10-10 RX ORDER — CHLORHEXIDINE GLUCONATE 213 G/1000ML
1 SOLUTION TOPICAL
Refills: 0 | Status: DISCONTINUED | OUTPATIENT
Start: 2019-10-10 | End: 2019-10-15

## 2019-10-10 RX ORDER — POTASSIUM CHLORIDE 20 MEQ
10 PACKET (EA) ORAL
Refills: 0 | Status: COMPLETED | OUTPATIENT
Start: 2019-10-10 | End: 2019-10-10

## 2019-10-10 RX ORDER — POTASSIUM CHLORIDE 20 MEQ
10 PACKET (EA) ORAL
Refills: 0 | Status: DISCONTINUED | OUTPATIENT
Start: 2019-10-10 | End: 2019-10-11

## 2019-10-10 RX ORDER — OXYCODONE AND ACETAMINOPHEN 5; 325 MG/1; MG/1
1 TABLET ORAL EVERY 6 HOURS
Refills: 0 | Status: DISCONTINUED | OUTPATIENT
Start: 2019-10-10 | End: 2019-10-14

## 2019-10-10 RX ORDER — CHLORHEXIDINE GLUCONATE 213 G/1000ML
15 SOLUTION TOPICAL EVERY 12 HOURS
Refills: 0 | Status: DISCONTINUED | OUTPATIENT
Start: 2019-10-10 | End: 2019-10-10

## 2019-10-10 RX ORDER — ASPIRIN/CALCIUM CARB/MAGNESIUM 324 MG
81 TABLET ORAL DAILY
Refills: 0 | Status: DISCONTINUED | OUTPATIENT
Start: 2019-10-10 | End: 2019-10-15

## 2019-10-10 RX ORDER — NICARDIPINE HYDROCHLORIDE 30 MG/1
5 CAPSULE, EXTENDED RELEASE ORAL
Qty: 40 | Refills: 0 | Status: DISCONTINUED | OUTPATIENT
Start: 2019-10-10 | End: 2019-10-11

## 2019-10-10 RX ORDER — DOCUSATE SODIUM 100 MG
100 CAPSULE ORAL THREE TIMES A DAY
Refills: 0 | Status: DISCONTINUED | OUTPATIENT
Start: 2019-10-10 | End: 2019-10-15

## 2019-10-10 RX ORDER — SODIUM CHLORIDE 9 MG/ML
1000 INJECTION, SOLUTION INTRAVENOUS
Refills: 0 | Status: DISCONTINUED | OUTPATIENT
Start: 2019-10-10 | End: 2019-10-11

## 2019-10-10 RX ORDER — INSULIN HUMAN 100 [IU]/ML
3 INJECTION, SOLUTION SUBCUTANEOUS
Qty: 100 | Refills: 0 | Status: DISCONTINUED | OUTPATIENT
Start: 2019-10-10 | End: 2019-10-11

## 2019-10-10 RX ORDER — ASPIRIN/CALCIUM CARB/MAGNESIUM 324 MG
300 TABLET ORAL ONCE
Refills: 0 | Status: DISCONTINUED | OUTPATIENT
Start: 2019-10-10 | End: 2019-10-11

## 2019-10-10 RX ORDER — HYDRALAZINE HCL 50 MG
1 TABLET ORAL
Qty: 0 | Refills: 0 | DISCHARGE

## 2019-10-10 RX ORDER — POLYETHYLENE GLYCOL 3350 17 G/17G
17 POWDER, FOR SOLUTION ORAL DAILY
Refills: 0 | Status: DISCONTINUED | OUTPATIENT
Start: 2019-10-10 | End: 2019-10-15

## 2019-10-10 RX ORDER — METOCLOPRAMIDE HCL 10 MG
10 TABLET ORAL EVERY 8 HOURS
Refills: 0 | Status: COMPLETED | OUTPATIENT
Start: 2019-10-10 | End: 2019-10-12

## 2019-10-10 RX ORDER — PANTOPRAZOLE SODIUM 20 MG/1
40 TABLET, DELAYED RELEASE ORAL DAILY
Refills: 0 | Status: DISCONTINUED | OUTPATIENT
Start: 2019-10-10 | End: 2019-10-11

## 2019-10-10 RX ORDER — CEFUROXIME AXETIL 250 MG
1500 TABLET ORAL EVERY 8 HOURS
Refills: 0 | Status: COMPLETED | OUTPATIENT
Start: 2019-10-10 | End: 2019-10-12

## 2019-10-10 RX ORDER — HYDROMORPHONE HYDROCHLORIDE 2 MG/ML
0.5 INJECTION INTRAMUSCULAR; INTRAVENOUS; SUBCUTANEOUS EVERY 6 HOURS
Refills: 0 | Status: DISCONTINUED | OUTPATIENT
Start: 2019-10-10 | End: 2019-10-11

## 2019-10-10 RX ORDER — CEFUROXIME AXETIL 250 MG
1500 TABLET ORAL ONCE
Refills: 0 | Status: DISCONTINUED | OUTPATIENT
Start: 2019-10-10 | End: 2019-10-10

## 2019-10-10 RX ORDER — SODIUM CHLORIDE 9 MG/ML
1000 INJECTION INTRAMUSCULAR; INTRAVENOUS; SUBCUTANEOUS
Refills: 0 | Status: DISCONTINUED | OUTPATIENT
Start: 2019-10-10 | End: 2019-10-12

## 2019-10-10 RX ORDER — LEVETIRACETAM 250 MG/1
1000 TABLET, FILM COATED ORAL
Refills: 0 | Status: DISCONTINUED | OUTPATIENT
Start: 2019-10-10 | End: 2019-10-15

## 2019-10-10 RX ORDER — ASPIRIN/CALCIUM CARB/MAGNESIUM 324 MG
300 TABLET ORAL ONCE
Refills: 0 | Status: COMPLETED | OUTPATIENT
Start: 2019-10-10

## 2019-10-10 RX ORDER — SODIUM CHLORIDE 9 MG/ML
3 INJECTION INTRAMUSCULAR; INTRAVENOUS; SUBCUTANEOUS EVERY 8 HOURS
Refills: 0 | Status: DISCONTINUED | OUTPATIENT
Start: 2019-10-10 | End: 2019-10-10

## 2019-10-10 RX ORDER — INFLUENZA VIRUS VACCINE 15; 15; 15; 15 UG/.5ML; UG/.5ML; UG/.5ML; UG/.5ML
0.5 SUSPENSION INTRAMUSCULAR ONCE
Refills: 0 | Status: DISCONTINUED | OUTPATIENT
Start: 2019-10-10 | End: 2019-10-11

## 2019-10-10 RX ORDER — OXYCODONE AND ACETAMINOPHEN 5; 325 MG/1; MG/1
2 TABLET ORAL EVERY 6 HOURS
Refills: 0 | Status: DISCONTINUED | OUTPATIENT
Start: 2019-10-10 | End: 2019-10-14

## 2019-10-10 RX ORDER — DEXTROSE 50 % IN WATER 50 %
50 SYRINGE (ML) INTRAVENOUS
Refills: 0 | Status: DISCONTINUED | OUTPATIENT
Start: 2019-10-10 | End: 2019-10-11

## 2019-10-10 RX ORDER — FENTANYL CITRATE 50 UG/ML
50 INJECTION INTRAVENOUS ONCE
Refills: 0 | Status: DISCONTINUED | OUTPATIENT
Start: 2019-10-10 | End: 2019-10-10

## 2019-10-10 RX ORDER — METOCLOPRAMIDE HCL 10 MG
10 TABLET ORAL EVERY 8 HOURS
Refills: 0 | Status: DISCONTINUED | OUTPATIENT
Start: 2019-10-10 | End: 2019-10-10

## 2019-10-10 RX ORDER — DEXTROSE 50 % IN WATER 50 %
25 SYRINGE (ML) INTRAVENOUS
Refills: 0 | Status: DISCONTINUED | OUTPATIENT
Start: 2019-10-10 | End: 2019-10-11

## 2019-10-10 RX ORDER — CHLORHEXIDINE GLUCONATE 213 G/1000ML
1 SOLUTION TOPICAL ONCE
Refills: 0 | Status: DISCONTINUED | OUTPATIENT
Start: 2019-10-10 | End: 2019-10-10

## 2019-10-10 RX ORDER — HYDROMORPHONE HYDROCHLORIDE 2 MG/ML
0.5 INJECTION INTRAMUSCULAR; INTRAVENOUS; SUBCUTANEOUS ONCE
Refills: 0 | Status: DISCONTINUED | OUTPATIENT
Start: 2019-10-10 | End: 2019-10-10

## 2019-10-10 RX ORDER — LIDOCAINE HCL 20 MG/ML
0.2 VIAL (ML) INJECTION ONCE
Refills: 0 | Status: DISCONTINUED | OUTPATIENT
Start: 2019-10-10 | End: 2019-10-10

## 2019-10-10 RX ORDER — MEPERIDINE HYDROCHLORIDE 50 MG/ML
25 INJECTION INTRAMUSCULAR; INTRAVENOUS; SUBCUTANEOUS ONCE
Refills: 0 | Status: DISCONTINUED | OUTPATIENT
Start: 2019-10-10 | End: 2019-10-10

## 2019-10-10 RX ADMIN — Medication 100 MILLIGRAM(S): at 17:07

## 2019-10-10 RX ADMIN — Medication 100 MILLIEQUIVALENT(S): at 18:17

## 2019-10-10 RX ADMIN — INSULIN HUMAN 3 UNIT(S)/HR: 100 INJECTION, SOLUTION SUBCUTANEOUS at 19:39

## 2019-10-10 RX ADMIN — LEVETIRACETAM 1000 MILLIGRAM(S): 250 TABLET, FILM COATED ORAL at 20:12

## 2019-10-10 RX ADMIN — HYDROMORPHONE HYDROCHLORIDE 0.5 MILLIGRAM(S): 2 INJECTION INTRAMUSCULAR; INTRAVENOUS; SUBCUTANEOUS at 21:18

## 2019-10-10 RX ADMIN — Medication 100 MILLIEQUIVALENT(S): at 20:13

## 2019-10-10 RX ADMIN — Medication 100 MILLIEQUIVALENT(S): at 20:41

## 2019-10-10 RX ADMIN — FENTANYL CITRATE 50 MICROGRAM(S): 50 INJECTION INTRAVENOUS at 19:39

## 2019-10-10 RX ADMIN — Medication 100 MILLIEQUIVALENT(S): at 16:48

## 2019-10-10 RX ADMIN — Medication 81 MILLIGRAM(S): at 20:12

## 2019-10-10 RX ADMIN — Medication 100 MILLIEQUIVALENT(S): at 21:31

## 2019-10-10 RX ADMIN — CHLORHEXIDINE GLUCONATE 15 MILLILITER(S): 213 SOLUTION TOPICAL at 18:18

## 2019-10-10 RX ADMIN — FENTANYL CITRATE 50 MICROGRAM(S): 50 INJECTION INTRAVENOUS at 17:13

## 2019-10-10 RX ADMIN — FENTANYL CITRATE 50 MICROGRAM(S): 50 INJECTION INTRAVENOUS at 19:55

## 2019-10-10 RX ADMIN — HYDROMORPHONE HYDROCHLORIDE 0.5 MILLIGRAM(S): 2 INJECTION INTRAMUSCULAR; INTRAVENOUS; SUBCUTANEOUS at 14:56

## 2019-10-10 RX ADMIN — FENTANYL CITRATE 50 MICROGRAM(S): 50 INJECTION INTRAVENOUS at 16:47

## 2019-10-10 RX ADMIN — Medication 10 MILLIGRAM(S): at 21:34

## 2019-10-10 RX ADMIN — HYDROMORPHONE HYDROCHLORIDE 0.5 MILLIGRAM(S): 2 INJECTION INTRAMUSCULAR; INTRAVENOUS; SUBCUTANEOUS at 15:24

## 2019-10-10 RX ADMIN — Medication 100 MILLIGRAM(S): at 23:54

## 2019-10-10 RX ADMIN — HYDROMORPHONE HYDROCHLORIDE 0.5 MILLIGRAM(S): 2 INJECTION INTRAMUSCULAR; INTRAVENOUS; SUBCUTANEOUS at 21:35

## 2019-10-10 RX ADMIN — NICARDIPINE HYDROCHLORIDE 25 MG/HR: 30 CAPSULE, EXTENDED RELEASE ORAL at 19:39

## 2019-10-10 RX ADMIN — Medication 100 MILLIEQUIVALENT(S): at 16:00

## 2019-10-10 NOTE — PRE-ANESTHESIA EVALUATION ADULT - WEIGHT IN LBS
Anticoagulation Summary as of 5/12/2017     INR goal 2.0-3.0   Selected INR 2.2 (5/12/2017)   Maintenance plan 2.5 mg (5 mg x 0.5) on Mon, Wed, Fri; 5 mg (5 mg x 1) all other days   Weekly total 27.5 mg   Plan last modified Santana Good PHARMD (11/18/2016)   Next INR check 6/9/2017   Target end date Indefinite    Indications   Atrial flutter (CMS-HCC) (Resolved) [I48.92]  Acute ischemic right PCA stroke-Hemorrhage transformation is noted within the infarct (Resolved) [I63.531]  Persistent atrial fibrillation (CMS-HCC) [I48.1]         Anticoagulation Episode Summary     INR check location [facility]    Preferred lab     Send INR reminders to     Comments       Anticoagulation Care Providers     Provider Role Specialty Phone number    Bijan Sampson M.D. Referring Cardiology [phone]    [facility] Responsible  [phone]        Anticoagulation Patient Findings    Lab Results   Component Value Date/Time    SODIUM 137 03/10/2017 09:15 AM    POTASSIUM 4.3 03/10/2017 09:15 AM    CHLORIDE 102 03/10/2017 09:15 AM    CO2 29 03/10/2017 09:15 AM    GLUCOSE 215* 03/10/2017 09:15 AM    BUN 15 03/10/2017 09:15 AM    CREATININE 1.07 03/10/2017 09:15 AM        Shani Love seen in clinic today  INR  therapeutic.    Denies signs/symptoms of bleeding and/or thrombosis.    Denies changes to diet or medications.   Follow up appointment in 4 week(s).    Continue weekly warfarin dose as noted     Loc Chan, PHARMD            [phone]

## 2019-10-10 NOTE — BRIEF OPERATIVE NOTE - OPERATION/FINDINGS
CABG x4: LIMA-LAD, SVG-OM1, SVG-OM2, SVG-PDA  KEAGAN - EF preserved  R femoral a-line placed after radial not correlating with aortic pressure

## 2019-10-10 NOTE — PROGRESS NOTE ADULT - SUBJECTIVE AND OBJECTIVE BOX
CRITICAL CARE ATTENDING - CTICU    MEDICATIONS  (STANDING):  cefuroxime  IVPB 1500 milliGRAM(s) IV Intermittent once  influenza   Vaccine 0.5 milliLiter(s) IntraMuscular once                              Mode: AC/ CMV (Assist Control/ Continuous Mandatory Ventilation)  RR (machine): 12  TV (machine): 600  FiO2: 100  PEEP: 5  ITime: 1  MAP: 9  PIP: 22      Daily Height in cm: 172.72 (10 Oct 2019 07:03)    Daily         Critically Ill patient  : [ ] preoperative ,   [ x] post operative    Requires :  [ x] Arterial Line   [x ] Central Line  [ ] PA catheter  [ ] IABP  [ ] ECMO  [ ] LVAD  [ x] Ventilator  [x pacemaker [ ] Impella. [x] 100% FiO2                       [ x] ABG's     [x ] Pulse Oxymetry Monitoring  Bedside evaluation , monitoring , treatment of hemodynamics , fluids , IVP/ IVCD meds.        Diagnosis:     Op Day CABG X 4 L    Hypotension    Hemodynamic lability ,instability. Requires IVCD [x ] vasopressors [ ] inotropes  [ ] vasodilator  [ x]IVSS fluid  to maintain MAP, perfusion, C.I.     Carotids        50% - R - Stenosis          L - 100% Stenosis + Stent        Keep MAP > 80 for CPP    Obesity OHS / NIMESH     Chest Tube Drainage     ECG    Ventilator Management:  [ x]AC-rest    [x ]CPAP-PS   Wean this PM  [ ]Trach Collar     [ ]Extubate    [ ] T-Piece  [ ]peep>5     Requires chest PT, pulmonary toilet, ambu bagging, suctioning to maintain SaO2,  patent airway and treat atelectasis.     May require Extubation to BIPAP     Smoker - increased A-a gradient      Bradycardia    Temporary pacemaker (TPM) interrogation and setting.     Requires   [ x]AII  temporary pacing at  80 min    to maintain HR, MAP, CI, and perfusion.     Old R - MCA infarct                               Discussed with CT surgeon, Physician's Assistant - Nurse Practitioner- Critical care medicine team.   Dicussed at  AM / PM rounds.   Chart, labs , films reviewed.    Total Time: 30 min

## 2019-10-10 NOTE — AIRWAY REMOVAL NOTE  ADULT & PEDS - ARTIFICAL AIRWAY REMOVAL COMMENTS
Written order for extubation verified. The patient was identified by full name and birth date compared to the identification band. Present during the procedure was MARC Medley

## 2019-10-11 DIAGNOSIS — Z95.1 PRESENCE OF AORTOCORONARY BYPASS GRAFT: ICD-10-CM

## 2019-10-11 DIAGNOSIS — Z29.9 ENCOUNTER FOR PROPHYLACTIC MEASURES, UNSPECIFIED: ICD-10-CM

## 2019-10-11 LAB
ALBUMIN SERPL ELPH-MCNC: 3.5 G/DL — SIGNIFICANT CHANGE UP (ref 3.3–5)
ALP SERPL-CCNC: 54 U/L — SIGNIFICANT CHANGE UP (ref 40–120)
ALT FLD-CCNC: 48 U/L — HIGH (ref 10–45)
ANION GAP SERPL CALC-SCNC: 10 MMOL/L — SIGNIFICANT CHANGE UP (ref 5–17)
APTT BLD: 27.1 SEC — LOW (ref 27.5–36.3)
AST SERPL-CCNC: 49 U/L — HIGH (ref 10–40)
BILIRUB SERPL-MCNC: 2 MG/DL — HIGH (ref 0.2–1.2)
BUN SERPL-MCNC: 15 MG/DL — SIGNIFICANT CHANGE UP (ref 7–23)
CALCIUM SERPL-MCNC: 8.8 MG/DL — SIGNIFICANT CHANGE UP (ref 8.4–10.5)
CHLORIDE SERPL-SCNC: 103 MMOL/L — SIGNIFICANT CHANGE UP (ref 96–108)
CO2 SERPL-SCNC: 22 MMOL/L — SIGNIFICANT CHANGE UP (ref 22–31)
CREAT SERPL-MCNC: 0.75 MG/DL — SIGNIFICANT CHANGE UP (ref 0.5–1.3)
GAS PNL BLDA: SIGNIFICANT CHANGE UP
GLUCOSE BLDC GLUCOMTR-MCNC: 126 MG/DL — HIGH (ref 70–99)
GLUCOSE BLDC GLUCOMTR-MCNC: 128 MG/DL — HIGH (ref 70–99)
GLUCOSE BLDC GLUCOMTR-MCNC: 129 MG/DL — HIGH (ref 70–99)
GLUCOSE BLDC GLUCOMTR-MCNC: 131 MG/DL — HIGH (ref 70–99)
GLUCOSE BLDC GLUCOMTR-MCNC: 137 MG/DL — HIGH (ref 70–99)
GLUCOSE BLDC GLUCOMTR-MCNC: 141 MG/DL — HIGH (ref 70–99)
GLUCOSE BLDC GLUCOMTR-MCNC: 151 MG/DL — HIGH (ref 70–99)
GLUCOSE BLDC GLUCOMTR-MCNC: 155 MG/DL — HIGH (ref 70–99)
GLUCOSE SERPL-MCNC: 157 MG/DL — HIGH (ref 70–99)
HCT VFR BLD CALC: 38.5 % — SIGNIFICANT CHANGE UP (ref 34.5–45)
HGB BLD-MCNC: 12.4 G/DL — SIGNIFICANT CHANGE UP (ref 11.5–15.5)
INR BLD: 1.31 RATIO — HIGH (ref 0.88–1.16)
MAGNESIUM SERPL-MCNC: 1.5 MG/DL — LOW (ref 1.6–2.6)
MCHC RBC-ENTMCNC: 24 PG — LOW (ref 27–34)
MCHC RBC-ENTMCNC: 32.2 GM/DL — SIGNIFICANT CHANGE UP (ref 32–36)
MCV RBC AUTO: 74.5 FL — LOW (ref 80–100)
NRBC # BLD: 0 /100 WBCS — SIGNIFICANT CHANGE UP (ref 0–0)
PHOSPHATE SERPL-MCNC: 3.3 MG/DL — SIGNIFICANT CHANGE UP (ref 2.5–4.5)
PLATELET # BLD AUTO: 245 K/UL — SIGNIFICANT CHANGE UP (ref 150–400)
POTASSIUM SERPL-MCNC: 4.3 MMOL/L — SIGNIFICANT CHANGE UP (ref 3.5–5.3)
POTASSIUM SERPL-SCNC: 4.3 MMOL/L — SIGNIFICANT CHANGE UP (ref 3.5–5.3)
PROT SERPL-MCNC: 5.8 G/DL — LOW (ref 6–8.3)
PROTHROM AB SERPL-ACNC: 15.2 SEC — HIGH (ref 10–12.9)
RBC # BLD: 5.17 M/UL — SIGNIFICANT CHANGE UP (ref 3.8–5.2)
RBC # FLD: 19.2 % — HIGH (ref 10.3–14.5)
SODIUM SERPL-SCNC: 135 MMOL/L — SIGNIFICANT CHANGE UP (ref 135–145)
TSH SERPL-MCNC: 0.79 UIU/ML — SIGNIFICANT CHANGE UP (ref 0.27–4.2)
WBC # BLD: 17.27 K/UL — HIGH (ref 3.8–10.5)
WBC # FLD AUTO: 17.27 K/UL — HIGH (ref 3.8–10.5)

## 2019-10-11 PROCEDURE — 93010 ELECTROCARDIOGRAM REPORT: CPT

## 2019-10-11 PROCEDURE — 99232 SBSQ HOSP IP/OBS MODERATE 35: CPT

## 2019-10-11 PROCEDURE — 99233 SBSQ HOSP IP/OBS HIGH 50: CPT

## 2019-10-11 PROCEDURE — 71045 X-RAY EXAM CHEST 1 VIEW: CPT | Mod: 26

## 2019-10-11 RX ORDER — INSULIN LISPRO 100/ML
VIAL (ML) SUBCUTANEOUS
Refills: 0 | Status: DISCONTINUED | OUTPATIENT
Start: 2019-10-11 | End: 2019-10-11

## 2019-10-11 RX ORDER — SODIUM CHLORIDE 9 MG/ML
3 INJECTION INTRAMUSCULAR; INTRAVENOUS; SUBCUTANEOUS EVERY 8 HOURS
Refills: 0 | Status: DISCONTINUED | OUTPATIENT
Start: 2019-10-11 | End: 2019-10-15

## 2019-10-11 RX ORDER — ACETAMINOPHEN 500 MG
1000 TABLET ORAL ONCE
Refills: 0 | Status: COMPLETED | OUTPATIENT
Start: 2019-10-11 | End: 2019-10-11

## 2019-10-11 RX ORDER — POTASSIUM CHLORIDE 20 MEQ
10 PACKET (EA) ORAL
Refills: 0 | Status: COMPLETED | OUTPATIENT
Start: 2019-10-11 | End: 2019-10-11

## 2019-10-11 RX ORDER — MAGNESIUM SULFATE 500 MG/ML
1 VIAL (ML) INJECTION ONCE
Refills: 0 | Status: COMPLETED | OUTPATIENT
Start: 2019-10-11 | End: 2019-10-11

## 2019-10-11 RX ORDER — HYDROMORPHONE HYDROCHLORIDE 2 MG/ML
0.5 INJECTION INTRAMUSCULAR; INTRAVENOUS; SUBCUTANEOUS ONCE
Refills: 0 | Status: DISCONTINUED | OUTPATIENT
Start: 2019-10-11 | End: 2019-10-11

## 2019-10-11 RX ORDER — ATORVASTATIN CALCIUM 80 MG/1
80 TABLET, FILM COATED ORAL AT BEDTIME
Refills: 0 | Status: DISCONTINUED | OUTPATIENT
Start: 2019-10-11 | End: 2019-10-15

## 2019-10-11 RX ORDER — FAMOTIDINE 10 MG/ML
20 INJECTION INTRAVENOUS DAILY
Refills: 0 | Status: DISCONTINUED | OUTPATIENT
Start: 2019-10-11 | End: 2019-10-15

## 2019-10-11 RX ORDER — METOPROLOL TARTRATE 50 MG
25 TABLET ORAL EVERY 12 HOURS
Refills: 0 | Status: DISCONTINUED | OUTPATIENT
Start: 2019-10-11 | End: 2019-10-11

## 2019-10-11 RX ORDER — HEPARIN SODIUM 5000 [USP'U]/ML
5000 INJECTION INTRAVENOUS; SUBCUTANEOUS EVERY 8 HOURS
Refills: 0 | Status: DISCONTINUED | OUTPATIENT
Start: 2019-10-11 | End: 2019-10-15

## 2019-10-11 RX ORDER — HYDROMORPHONE HYDROCHLORIDE 2 MG/ML
0.5 INJECTION INTRAMUSCULAR; INTRAVENOUS; SUBCUTANEOUS ONCE
Refills: 0 | Status: DISCONTINUED | OUTPATIENT
Start: 2019-10-11 | End: 2019-10-12

## 2019-10-11 RX ORDER — METOPROLOL TARTRATE 50 MG
25 TABLET ORAL EVERY 8 HOURS
Refills: 0 | Status: DISCONTINUED | OUTPATIENT
Start: 2019-10-11 | End: 2019-10-12

## 2019-10-11 RX ADMIN — Medication 400 MILLIGRAM(S): at 21:59

## 2019-10-11 RX ADMIN — Medication 10 MILLIGRAM(S): at 13:59

## 2019-10-11 RX ADMIN — HYDROMORPHONE HYDROCHLORIDE 0.5 MILLIGRAM(S): 2 INJECTION INTRAMUSCULAR; INTRAVENOUS; SUBCUTANEOUS at 00:19

## 2019-10-11 RX ADMIN — Medication 100 MILLIGRAM(S): at 18:01

## 2019-10-11 RX ADMIN — HYDROMORPHONE HYDROCHLORIDE 0.5 MILLIGRAM(S): 2 INJECTION INTRAMUSCULAR; INTRAVENOUS; SUBCUTANEOUS at 06:30

## 2019-10-11 RX ADMIN — Medication 81 MILLIGRAM(S): at 12:04

## 2019-10-11 RX ADMIN — Medication 25 MILLIGRAM(S): at 22:03

## 2019-10-11 RX ADMIN — Medication 100 GRAM(S): at 05:00

## 2019-10-11 RX ADMIN — LEVETIRACETAM 1000 MILLIGRAM(S): 250 TABLET, FILM COATED ORAL at 17:12

## 2019-10-11 RX ADMIN — HYDROMORPHONE HYDROCHLORIDE 0.5 MILLIGRAM(S): 2 INJECTION INTRAMUSCULAR; INTRAVENOUS; SUBCUTANEOUS at 11:00

## 2019-10-11 RX ADMIN — HYDROMORPHONE HYDROCHLORIDE 0.5 MILLIGRAM(S): 2 INJECTION INTRAMUSCULAR; INTRAVENOUS; SUBCUTANEOUS at 04:45

## 2019-10-11 RX ADMIN — Medication 25 MILLIGRAM(S): at 06:32

## 2019-10-11 RX ADMIN — Medication 10 MILLIGRAM(S): at 05:05

## 2019-10-11 RX ADMIN — POLYETHYLENE GLYCOL 3350 17 GRAM(S): 17 POWDER, FOR SOLUTION ORAL at 12:04

## 2019-10-11 RX ADMIN — Medication 25 MILLIGRAM(S): at 13:59

## 2019-10-11 RX ADMIN — Medication 100 MILLIEQUIVALENT(S): at 07:35

## 2019-10-11 RX ADMIN — Medication 100 MILLIGRAM(S): at 14:00

## 2019-10-11 RX ADMIN — OXYCODONE AND ACETAMINOPHEN 2 TABLET(S): 5; 325 TABLET ORAL at 17:35

## 2019-10-11 RX ADMIN — Medication 2: at 13:49

## 2019-10-11 RX ADMIN — Medication 100 MILLIGRAM(S): at 08:35

## 2019-10-11 RX ADMIN — HEPARIN SODIUM 5000 UNIT(S): 5000 INJECTION INTRAVENOUS; SUBCUTANEOUS at 13:59

## 2019-10-11 RX ADMIN — OXYCODONE AND ACETAMINOPHEN 2 TABLET(S): 5; 325 TABLET ORAL at 17:10

## 2019-10-11 RX ADMIN — SODIUM CHLORIDE 3 MILLILITER(S): 9 INJECTION INTRAMUSCULAR; INTRAVENOUS; SUBCUTANEOUS at 14:00

## 2019-10-11 RX ADMIN — HEPARIN SODIUM 5000 UNIT(S): 5000 INJECTION INTRAVENOUS; SUBCUTANEOUS at 22:03

## 2019-10-11 RX ADMIN — Medication 100 MILLIEQUIVALENT(S): at 06:38

## 2019-10-11 RX ADMIN — ATORVASTATIN CALCIUM 80 MILLIGRAM(S): 80 TABLET, FILM COATED ORAL at 22:03

## 2019-10-11 RX ADMIN — Medication 100 MILLIEQUIVALENT(S): at 06:11

## 2019-10-11 RX ADMIN — FAMOTIDINE 20 MILLIGRAM(S): 10 INJECTION INTRAVENOUS at 12:04

## 2019-10-11 RX ADMIN — HYDROMORPHONE HYDROCHLORIDE 0.5 MILLIGRAM(S): 2 INJECTION INTRAMUSCULAR; INTRAVENOUS; SUBCUTANEOUS at 06:12

## 2019-10-11 RX ADMIN — OXYCODONE AND ACETAMINOPHEN 2 TABLET(S): 5; 325 TABLET ORAL at 11:00

## 2019-10-11 RX ADMIN — CHLORHEXIDINE GLUCONATE 1 APPLICATION(S): 213 SOLUTION TOPICAL at 05:06

## 2019-10-11 RX ADMIN — HYDROMORPHONE HYDROCHLORIDE 0.5 MILLIGRAM(S): 2 INJECTION INTRAMUSCULAR; INTRAVENOUS; SUBCUTANEOUS at 11:15

## 2019-10-11 RX ADMIN — OXYCODONE AND ACETAMINOPHEN 2 TABLET(S): 5; 325 TABLET ORAL at 10:00

## 2019-10-11 RX ADMIN — HYDROMORPHONE HYDROCHLORIDE 0.5 MILLIGRAM(S): 2 INJECTION INTRAMUSCULAR; INTRAVENOUS; SUBCUTANEOUS at 00:35

## 2019-10-11 RX ADMIN — HYDROMORPHONE HYDROCHLORIDE 0.5 MILLIGRAM(S): 2 INJECTION INTRAMUSCULAR; INTRAVENOUS; SUBCUTANEOUS at 04:28

## 2019-10-11 RX ADMIN — Medication 1000 MILLIGRAM(S): at 22:15

## 2019-10-11 RX ADMIN — LEVETIRACETAM 1000 MILLIGRAM(S): 250 TABLET, FILM COATED ORAL at 05:05

## 2019-10-11 RX ADMIN — Medication 10 MILLIGRAM(S): at 22:03

## 2019-10-11 RX ADMIN — Medication 100 MILLIGRAM(S): at 22:03

## 2019-10-11 RX ADMIN — SODIUM CHLORIDE 3 MILLILITER(S): 9 INJECTION INTRAMUSCULAR; INTRAVENOUS; SUBCUTANEOUS at 22:10

## 2019-10-11 NOTE — DIETITIAN INITIAL EVALUATION ADULT. - OTHER INFO
Pt currently reports good po intake/appetite at this time, denies GI distress. Denies chewing/swallowing difficulty. Pt denies food allergies or intolerance. Pt reports recent wt loss of ~30 pounds (per pt mostly intentional with healthful diet changes), was 256 pounds in June 2019 when she was admitted for CVA; current weight 224 pounds for total of 13%. HbA1c decreased from 6.2% to 6.0%. Physical activity was light PTA 2/2 debility post-CVA, but has been working with PT/OT to help improve. Pt reports making healthful changes of avoiding sugar-sweetened beverages and concentrated sweets as well as decreasing portion sizes. Pt inquiring about further recommendations for post-op nutrition and pre-DM.

## 2019-10-11 NOTE — PROGRESS NOTE ADULT - ASSESSMENT
53 F CVA, TVD s/p CABG, CM with recovered EF, progressing well.   ·	Progressing well post op  ·	Continue ASA  ·	Continue high dose statin.   ·	Continue metoprolol. Titrate as tolerated  ·	Incentive spirometry.   ·	D/w family and CTS    =======================================================================  Lito Moise MD FAC    Please page [phone] with questions  On nights and weekend please call the office [phone].  For all Cardiology service contact information, go to [email or web address] and use "[email or web address]" to login.

## 2019-10-11 NOTE — PHYSICAL THERAPY INITIAL EVALUATION ADULT - PERTINENT HX OF CURRENT PROBLEM, REHAB EVAL
53F w/ hx of Anxiety, HTN, HLD, Smoking (1ppd >30 yrs; quit in 6/2019), NIMESH? (was told "fit criteria"- has lost 40 pounds since; no formal sleep study done as of yet) & recent Right MCA infarct (6/19) s/p SANDIE stent w/ Dr. Barrios. Plavix stopped on 9/25/2019, on daily Aspirin 81mg. No residual as per pt; some occasional tingling to left hand but has full strength & sensation. Pt denies N&V, headaches, chest pain or SOB and feels well otherwise. S/P above surgery

## 2019-10-11 NOTE — DIETITIAN INITIAL EVALUATION ADULT. - PERTINENT LABORATORY DATA
CAPILLARY BLOOD GLUCOSE  155 (11 Oct 2019 12:00)  137 (11 Oct 2019 08:00)  155 (11 Oct 2019 07:00)  151 (11 Oct 2019 06:00)  134 (11 Oct 2019 05:00)  128 (11 Oct 2019 04:00)  126 (11 Oct 2019 03:00)  129 (11 Oct 2019 02:00)  10/11: Glucose: 157. HbA1c: 6.0% 10/3/19.

## 2019-10-11 NOTE — PROGRESS NOTE ADULT - PROBLEM SELECTOR PLAN 2
-minimal residual s/e-->slightly dec  strength on left   -Plavix will be re-started [place] (10/12) per Dr. Rivera   -1g Keppra for seizure prophylaxis post-CVA in 6/19

## 2019-10-11 NOTE — PHYSICAL THERAPY INITIAL EVALUATION ADULT - PRECAUTIONS/LIMITATIONS, REHAB EVAL
fall precautions/sternal precautions/cardiac precautions cardiac precautions/fall precautions/sternal precautions/seizure precautions

## 2019-10-11 NOTE — DIETITIAN INITIAL EVALUATION ADULT. - ENERGY NEEDS
ht: 68 inches. wt: 224 pounds admit/pre-op wt 10/10/19. BMI: 34.1 kG/m2. UBW: 256 pounds x ~4 months ago. IBW: 140 pounds +/- 10%. %IBW: 160%  Other pertinent objective information: 53 year old female pt with PMH presents for a CABG. TTE demonstrated severely dilated left atrium, severe diastolic dysfunction and cardiac cath showed triple vessel disease. Pt w hx of anxiety, HTN, HLD, Smoking (1ppd >30 yrs; quit in 6/2019), and recent Right MCA infarct (6/19) s/p SANDIE. Now s/p CABGx4 on 10/10/19. Surgical incision noted.

## 2019-10-11 NOTE — DIETITIAN INITIAL EVALUATION ADULT. - DIET TYPE
Consistent CHO no snacks, DASH/TLC. DASH/TLC (sodium and cholesterol restricted diet)/consistent carbohydrate (no snacks)

## 2019-10-11 NOTE — PHYSICAL THERAPY INITIAL EVALUATION ADULT - ADDITIONAL COMMENTS
Pt lives in a private house, 10 steps total to enter (no handrail) then another 12 steps to the bed/bathroom, 1 handrail. Pt fully indep PTA however not driving d/t h/o seizure; states her mother will come from [place] to assist her at home as necessary upon d/c

## 2019-10-11 NOTE — PROGRESS NOTE ADULT - SUBJECTIVE AND OBJECTIVE BOX
Cardiology Follow Up  ==========================================  CC: CABG    HPI: Progressing well post CABG. Denies chest pain. No SOB.     Review Of Systems:  Negative except as documented above    Medications:  acetaminophen  IVPB ..   400 mL/Hr IV Intermittent (10-11-19 @ 21:59)    aspirin enteric coated   81 milliGRAM(s) Oral (10-11-19 @ 12:04)    atorvastatin   80 milliGRAM(s) Oral (10-11-19 @ 22:03)    cefuroxime  IVPB   100 mL/Hr IV Intermittent (10-11-19 @ 18:01)   100 mL/Hr IV Intermittent (10-11-19 @ 08:35)   100 mL/Hr IV Intermittent (10-10-19 @ 23:54)    chlorhexidine 2% Cloths   1 Application(s) Topical (10-11-19 @ 05:06)    docusate sodium   100 milliGRAM(s) Oral (10-11-19 @ 22:03)   100 milliGRAM(s) Oral (10-11-19 @ 14:00)    famotidine    Tablet   20 milliGRAM(s) Oral (10-11-19 @ 12:04)    heparin  Injectable   5000 Unit(s) SubCutaneous (10-11-19 @ 22:03)   5000 Unit(s) SubCutaneous (10-11-19 @ 13:59)    HYDROmorphone  Injectable   0.5 milliGRAM(s) IV Push (10-11-19 @ 06:12)    HYDROmorphone  Injectable   0.5 milliGRAM(s) IV Push (10-11-19 @ 11:00)   0.5 milliGRAM(s) IV Push (10-11-19 @ 04:28)    HYDROmorphone  Injectable   0.5 milliGRAM(s) IV Push (10-11-19 @ 00:19)    insulin lispro (HumaLOG) corrective regimen sliding scale   2 Unit(s) SubCutaneous (10-11-19 @ 13:49)    levETIRAcetam   1000 milliGRAM(s) Oral (10-11-19 @ 17:12)   1000 milliGRAM(s) Oral (10-11-19 @ 05:05)    magnesium sulfate  IVPB   100 mL/Hr IV Intermittent (10-11-19 @ 05:00)    metoclopramide Injectable   10 milliGRAM(s) IV Push (10-11-19 @ 22:03)   10 milliGRAM(s) IV Push (10-11-19 @ 13:59)   10 milliGRAM(s) IV Push (10-11-19 @ 05:05)    metoprolol tartrate   25 milliGRAM(s) Oral (10-11-19 @ 22:03)   25 milliGRAM(s) Oral (10-11-19 @ 13:59)    metoprolol tartrate   25 milliGRAM(s) Oral (10-11-19 @ 06:32)    oxyCODONE    5 mG/acetaminophen 325 mG   2 Tablet(s) Oral (10-11-19 @ 17:10)   2 Tablet(s) Oral (10-11-19 @ 10:00)    polyethylene glycol 3350   17 Gram(s) Oral (10-11-19 @ 12:04)    potassium chloride  10 mEq/50 mL IVPB   100 mL/Hr IV Intermittent (10-11-19 @ 07:35)   100 mL/Hr IV Intermittent (10-11-19 @ 06:38)   100 mL/Hr IV Intermittent (10-11-19 @ 06:11)    sodium chloride 0.9% lock flush   3 milliLiter(s) IV Push (10-11-19 @ 22:10)   3 milliLiter(s) IV Push (10-11-19 @ 14:00)        Telemetry:  No significant ectopy     Vital Signs Last 24 Hrs  T(C): 36.7 (11 Oct 2019 19:08), Max: 37.5 (11 Oct 2019 04:00)  T(F): 98 (11 Oct 2019 19:08), Max: 99.5 (11 Oct 2019 04:00)  HR: 78 (11 Oct 2019 20:21) (78 - 98)  BP: 116/67 (11 Oct 2019 20:21) (90/56 - 136/77)  BP(mean): 85 (11 Oct 2019 20:21) (70 - 98)  RR: 18 (11 Oct 2019 19:08) (7 - 18)  SpO2: 97% (11 Oct 2019 19:08) (94% - 98%)  I&O's Summary    10 Oct 2019 07:01  -  11 Oct 2019 07:00  --------------------------------------------------------  IN: 863 mL / OUT: 1815 mL / NET: -952 mL    11 Oct 2019 07:01  -  11 Oct 2019 23:07  --------------------------------------------------------  IN: 794.5 mL / OUT: 1705 mL / NET: -910.5 mL        Physical Exam:  General: [x ] NAD  HEENT: [x ] EOMI [ x] PERRL  Cor: [x ] S1,S2 [x ] RRR [x ] No M/R/G   Lungs: [x ] CTA B/L [ x] Normal effort  Abd: [x ] Soft [x ] Non-tender [x ] +BS  Ext: [ x] LE wrapped [x ] No cyanosis    Labs:                        12.4   17.27 )-----------( 245      ( 11 Oct 2019 00:22 )             38.5     10-11    135  |  103  |  15  ----------------------------<  157<H>  4.3   |  22  |  0.75    Ca    8.8      11 Oct 2019 00:22  Phos  3.3     10-11  Mg     1.5     10-11    TPro  5.8<L>  /  Alb  3.5  /  TBili  2.0<H>  /  DBili  x   /  AST  49<H>  /  ALT  48<H>  /  AlkPhos  54  10-11    Troponin T, High Sensitivity Result: 290 ng/L (10-10-19 @ 14:41)

## 2019-10-11 NOTE — PROGRESS NOTE ADULT - SUBJECTIVE AND OBJECTIVE BOX
Subjective: Pt states "Some pain with breathing, but feel OK." Denies any acute CP, SOB, N/V and palpitations. No acute events overnight.     VITAL SIGNS  Telemetry:  NSR 80s  Vital Signs Last 24 Hrs  T(C): 36.7 (10-11-19 @ 12:50), Max: 37.5 (10-11-19 @ 04:00)  T(F): 98 (10-11-19 @ 12:50), Max: 99.5 (10-11-19 @ 04:00)  HR: 90 (10-11-19 @ 12:50) (18 - 160)  BP: 136/77 (10-11-19 @ 12:50) (136/77 - 136/77)  RR: 18 (10-11-19 @ 12:50) (7 - 18)  SpO2: 96% (10-11-19 @ 12:50) (94% - 100%)            10-10 @ 07:01  -  10-11 @ 07:00  --------------------------------------------------------  IN: 863 mL / OUT: 1815 mL / NET: -952 mL    10-11 @ 07:01  -  10-11 @ 13:24  --------------------------------------------------------  IN: 154.5 mL / OUT: 800 mL / NET: -645.5 mL    Bilirubin Total, Serum: 2.0 mg/dL (10-11 @ 00:22)  Bilirubin Total, Serum: 2.6 mg/dL (10-10 @ 14:41)    CAPILLARY BLOOD GLUCOSE  POCT Blood Glucose.: 155 mg/dL (11 Oct 2019 06:41)  POCT Blood Glucose.: 151 mg/dL (11 Oct 2019 06:02)  POCT Blood Glucose.: 128 mg/dL (11 Oct 2019 04:07)  POCT Blood Glucose.: 126 mg/dL (11 Oct 2019 03:07)  POCT Blood Glucose.: 129 mg/dL (11 Oct 2019 02:00)  POCT Blood Glucose.: 131 mg/dL (11 Oct 2019 01:01)  POCT Blood Glucose.: 125 mg/dL (10 Oct 2019 22:54)        MEDICATIONS  aspirin enteric coated 81 milliGRAM(s) Oral daily  atorvastatin 80 milliGRAM(s) Oral at bedtime  cefuroxime  IVPB 1500 milliGRAM(s) IV Intermittent every 8 hours  chlorhexidine 2% Cloths 1 Application(s) Topical <User Schedule>  dextrose 5%. 1000 milliLiter(s) IV Continuous <Continuous>  dextrose 50% Injectable 50 milliLiter(s) IV Push every 15 minutes  dextrose 50% Injectable 25 milliLiter(s) IV Push every 15 minutes  docusate sodium 100 milliGRAM(s) Oral three times a day  famotidine    Tablet 20 milliGRAM(s) Oral daily  heparin  Injectable 5000 Unit(s) SubCutaneous every 8 hours  insulin lispro (HumaLOG) corrective regimen sliding scale   SubCutaneous Before meals and at bedtime  levETIRAcetam 1000 milliGRAM(s) Oral two times a day  metoclopramide Injectable 10 milliGRAM(s) IV Push every 8 hours  metoprolol tartrate 25 milliGRAM(s) Oral every 8 hours  oxyCODONE    5 mG/acetaminophen 325 mG 1 Tablet(s) Oral every 6 hours PRN  oxyCODONE    5 mG/acetaminophen 325 mG 2 Tablet(s) Oral every 6 hours PRN  polyethylene glycol 3350 17 Gram(s) Oral daily  sodium chloride 0.9% lock flush 3 milliLiter(s) IV Push every 8 hours  sodium chloride 0.9%. 1000 milliLiter(s) IV Continuous <Continuous>    PHYSICAL EXAM  Neurology: A&Ox3, nonfocal, no gross deficits  CV : RRR, +S1S2  Sternal Wound : CDI with sternal dressing, Stable sternum, mediastinal and left pleural CTs in place, RIJ   Lungs: Respirations non-labored, CTA B/L   Abdomen: Soft, NT/ND, +BSx4Q, last BM pre-op, (-)N/V/D  : Voiding without difficulty, bladder non-distended   Extremities: trace B/L LE edema-ACE wraps in place, negative calf tenderness, +PP, SVG incision-CDI, sandbag to R groin   PW: VVI 40, MA 10     LABS  10-11    135  |  103  |  15  ----------------------------<  157<H>  4.3   |  22  |  0.75    Ca    8.8      11 Oct 2019 00:22  Phos  3.3     10-11  Mg     1.5     10-11    TPro  5.8<L>  /  Alb  3.5  /  TBili  2.0<H>  /  DBili  x   /  AST  49<H>  /  ALT  48<H>  /  AlkPhos  54  10-11                             12.4   17.27 )-----------( 245      ( 11 Oct 2019 00:22 )             38.5          PT/INR - ( 11 Oct 2019 00:22 )   PT: 15.2 sec;   INR: 1.31 ratio    PTT - ( 11 Oct 2019 00:22 )  PTT:27.1 sec     PAST MEDICAL & SURGICAL HISTORY:  CVA (cerebrovascular accident): Ischemic Right MCA stroke  Atherosclerotic heart disease of native coronary artery without angina pectoris  Seizure  Hyperlipemia  Hypertension  Anxiety  History of ischemic right MCA stroke: s/p SANDIE stent on 6/21/19     Discharge planning: pending PT recommendations.

## 2019-10-11 NOTE — PROGRESS NOTE ADULT - ASSESSMENT
53yr old female presents for a CABG. TTE demonstrated severely dilated left atrium, severe diastolic dysfunction and cardiac cath showed triple vessel disease. Pt w/ hx of Anxiety, Hypertension, Hyperlipidemia, Smoking (1ppd >30 yrs; quit in 6/2019), and recent Right MCA infarct (6/19) s/p SANDIE stent w/ Dr. Barrios.  10/10: Pt underwent CABGx4 with Dr. Rivera. CTU-extubated, dc'd pressors, no products in OR.   10/11: Pt transferred to SDU in stable condition. Dc'd mitchell and right femoral A line prior to arrival to unit-sandbag on right groin.   Will restart Plavix tomorrow for hx of recent CVA per Dr. Rivera.   Mediastinal and left pleural CT in place with minimal drainage.   PW in place VVI 40, MA 10.   Discharge planning-pending PT recommendations.

## 2019-10-11 NOTE — PROGRESS NOTE ADULT - SUBJECTIVE AND OBJECTIVE BOX
CRITICAL CARE ATTENDING - CTICU    MEDICATIONS  (STANDING):  aspirin enteric coated 81 milliGRAM(s) Oral daily  atorvastatin 80 milliGRAM(s) Oral at bedtime  cefuroxime  IVPB 1500 milliGRAM(s) IV Intermittent every 8 hours  chlorhexidine 2% Cloths 1 Application(s) Topical <User Schedule>  dextrose 5%. 1000 milliLiter(s) (15 mL/Hr) IV Continuous <Continuous>  dextrose 50% Injectable 50 milliLiter(s) IV Push every 15 minutes  dextrose 50% Injectable 25 milliLiter(s) IV Push every 15 minutes  docusate sodium 100 milliGRAM(s) Oral three times a day  HYDROmorphone  Injectable 0.5 milliGRAM(s) IV Push every 6 hours  influenza   Vaccine 0.5 milliLiter(s) IntraMuscular once  insulin regular Infusion 3 Unit(s)/Hr (3 mL/Hr) IV Continuous <Continuous>  levETIRAcetam 1000 milliGRAM(s) Oral two times a day  metoclopramide Injectable 10 milliGRAM(s) IV Push every 8 hours  metoprolol tartrate 25 milliGRAM(s) Oral every 12 hours  niCARdipine Infusion 5 mG/Hr (25 mL/Hr) IV Continuous <Continuous>  pantoprazole  Injectable 40 milliGRAM(s) IV Push daily  polyethylene glycol 3350 17 Gram(s) Oral daily  potassium chloride  10 mEq/50 mL IVPB 10 milliEquivalent(s) IV Intermittent every 1 hour  potassium chloride  10 mEq/50 mL IVPB 10 milliEquivalent(s) IV Intermittent every 1 hour  potassium chloride  10 mEq/50 mL IVPB 10 milliEquivalent(s) IV Intermittent every 1 hour  potassium chloride  10 mEq/50 mL IVPB 10 milliEquivalent(s) IV Intermittent every 1 hour  sodium chloride 0.9%. 1000 milliLiter(s) (10 mL/Hr) IV Continuous <Continuous>                                    12.4   17.27 )-----------( 245      ( 11 Oct 2019 00:22 )             38.5       10-11    135  |  103  |  15  ----------------------------<  157<H>  4.3   |  22  |  0.75    Ca    8.8      11 Oct 2019 00:22  Phos  3.3     10-11  Mg     1.5     10-11    TPro  5.8<L>  /  Alb  3.5  /  TBili  2.0<H>  /  DBili  x   /  AST  49<H>  /  ALT  48<H>  /  AlkPhos  54  10-11      PT/INR - ( 11 Oct 2019 00:22 )   PT: 15.2 sec;   INR: 1.31 ratio         PTT - ( 11 Oct 2019 00:22 )  PTT:27.1 sec    Mode: OFF      Daily     Daily       10-10 @ 07:01  -  10-11 @ 07:00  --------------------------------------------------------  IN: 863 mL / OUT: 1815 mL / NET: -952 mL        Critically Ill patient  : [ ] preoperative ,   [x ] post operative    Requires :  [ x] Arterial Line   [ x] Central Line  [ ] PA catheter  [ ] IABP  [ ] ECMO  [ ] LVAD  [ x] Ventilator  [x ] pacemaker [ ] Impella [x] 100% FiO2                      [x ] ABG's     [ x] Pulse Oxymetry Monitoring  Bedside evaluation , monitoring , treatment of hemodynamics , fluids , IVP/ IVCD meds.        Diagnosis:     POD Day 1 - CABG X 4 L    Hypotension    Hemodynamic lability ,instability. Requires IVCD [x ] vasopressors [ ] inotropes  [ ] vasodilator  [ x]IVSS fluid  to maintain MAP, perfusion, C.I.     Carotids        50% - R - Stenosis          L - 100% Stenosis + Stent        Keep MAP > 80 for CPP    Obesity OHS / NIMESH     Chest Tube Drainage     ECG    Ventilator Management:  [ x]AC-rest    [x ]CPAP-PS   Wean this PM  [ ]Trach Collar     [ ]Extubate    [ ] T-Piece  [ ]peep>5     Requires chest PT, pulmonary toilet, ambu bagging, suctioning to maintain SaO2,  patent airway and treat atelectasis.     May require Extubation to BIPAP     Smoker - increased A-a gradient      Bradycardia    Temporary pacemaker (TPM) interrogation and setting.     Requires   [ x]AII  temporary pacing at  80 min    to maintain HR, MAP, CI, and perfusion.     Old R - MCA infarct                   Discussed with CT surgeon, Physician's Assistant - Nurse Practitioner- Critical care medicine team.   Dicussed at  AM / PM rounds.   Chart, labs , films reviewed.    Total Time: CRITICAL CARE ATTENDING - CTICU    MEDICATIONS  (STANDING):  aspirin enteric coated 81 milliGRAM(s) Oral daily  atorvastatin 80 milliGRAM(s) Oral at bedtime  cefuroxime  IVPB 1500 milliGRAM(s) IV Intermittent every 8 hours  chlorhexidine 2% Cloths 1 Application(s) Topical <User Schedule>  dextrose 5%. 1000 milliLiter(s) (15 mL/Hr) IV Continuous <Continuous>  dextrose 50% Injectable 50 milliLiter(s) IV Push every 15 minutes  dextrose 50% Injectable 25 milliLiter(s) IV Push every 15 minutes  docusate sodium 100 milliGRAM(s) Oral three times a day  HYDROmorphone  Injectable 0.5 milliGRAM(s) IV Push every 6 hours  influenza   Vaccine 0.5 milliLiter(s) IntraMuscular once  insulin regular Infusion 3 Unit(s)/Hr (3 mL/Hr) IV Continuous <Continuous>  levETIRAcetam 1000 milliGRAM(s) Oral two times a day  metoclopramide Injectable 10 milliGRAM(s) IV Push every 8 hours  metoprolol tartrate 25 milliGRAM(s) Oral every 12 hours  niCARdipine Infusion 5 mG/Hr (25 mL/Hr) IV Continuous <Continuous>  pantoprazole  Injectable 40 milliGRAM(s) IV Push daily  polyethylene glycol 3350 17 Gram(s) Oral daily  potassium chloride  10 mEq/50 mL IVPB 10 milliEquivalent(s) IV Intermittent every 1 hour  potassium chloride  10 mEq/50 mL IVPB 10 milliEquivalent(s) IV Intermittent every 1 hour  potassium chloride  10 mEq/50 mL IVPB 10 milliEquivalent(s) IV Intermittent every 1 hour  potassium chloride  10 mEq/50 mL IVPB 10 milliEquivalent(s) IV Intermittent every 1 hour  sodium chloride 0.9%. 1000 milliLiter(s) (10 mL/Hr) IV Continuous <Continuous>                                    12.4   17.27 )-----------( 245      ( 11 Oct 2019 00:22 )             38.5       10-11    135  |  103  |  15  ----------------------------<  157<H>  4.3   |  22  |  0.75    Ca    8.8      11 Oct 2019 00:22  Phos  3.3     10-11  Mg     1.5     10-11    TPro  5.8<L>  /  Alb  3.5  /  TBili  2.0<H>  /  DBili  x   /  AST  49<H>  /  ALT  48<H>  /  AlkPhos  54  10-11      PT/INR - ( 11 Oct 2019 00:22 )   PT: 15.2 sec;   INR: 1.31 ratio         PTT - ( 11 Oct 2019 00:22 )  PTT:27.1 sec    Mode: OFF      Daily     Daily       10-10 @ 07:01  -  10-11 @ 07:00  --------------------------------------------------------  IN: 863 mL / OUT: 1815 mL / NET: -952 mL        Critically Ill patient  : [ ] preoperative ,   [x ] post operative    Requires :  [ x] Arterial Line   [ x] Central Line  [ ] PA catheter  [ ] IABP  [ ] ECMO  [ ] LVAD  [ x] Ventilator  [x ] pacemaker [ ] Impella [x] 100% FiO2                      [x ] ABG's     [ x] Pulse Oxymetry Monitoring  Bedside evaluation , monitoring , treatment of hemodynamics , fluids , IVP/ IVCD meds.        Diagnosis:     POD Day 1 - CABG X 4 L    Hypotension    Hemodynamic lability ,instability. Requires IVCD [x ] vasopressors [ ] inotropes  [ ] vasodilator  [ x]IVSS fluid  to maintain MAP, perfusion, C.I.     Carotids        50% - R - Stenosis          L - 100% Stenosis + Stent        Keep MAP > 80 for CPP    Obesity OHS / NIMESH     Chest Tube Drainage     ECG    Ventilator Management:  [ x]AC-rest    [x ]CPAP-PS   Wean this PM  [ ]Trach Collar     [ ]Extubate    [ ] T-Piece  [ ]peep>5     Requires chest PT, pulmonary toilet, ambu bagging, suctioning to maintain SaO2,  patent airway and treat atelectasis.     May require Extubation to BIPAP     Smoker - increased A-a gradient      Bradycardia    Temporary pacemaker (TPM) interrogation and setting.     Requires   [ x]AII  temporary pacing at  80 min    to maintain HR, MAP, CI, and perfusion.     Old R - MCA infarct               Discussed with CT surgeon, Physician's Assistant - Nurse Practitioner- Critical care medicine team.   Dicussed at  AM / PM rounds.   Chart, labs , films reviewed.    Total Time: CRITICAL CARE ATTENDING - CTICU    MEDICATIONS  (STANDING):  aspirin enteric coated 81 milliGRAM(s) Oral daily  atorvastatin 80 milliGRAM(s) Oral at bedtime  cefuroxime  IVPB 1500 milliGRAM(s) IV Intermittent every 8 hours  chlorhexidine 2% Cloths 1 Application(s) Topical <User Schedule>  dextrose 5%. 1000 milliLiter(s) (15 mL/Hr) IV Continuous <Continuous>  dextrose 50% Injectable 50 milliLiter(s) IV Push every 15 minutes  dextrose 50% Injectable 25 milliLiter(s) IV Push every 15 minutes  docusate sodium 100 milliGRAM(s) Oral three times a day  HYDROmorphone  Injectable 0.5 milliGRAM(s) IV Push every 6 hours  influenza   Vaccine 0.5 milliLiter(s) IntraMuscular once  insulin regular Infusion 3 Unit(s)/Hr (3 mL/Hr) IV Continuous <Continuous>  levETIRAcetam 1000 milliGRAM(s) Oral two times a day  metoclopramide Injectable 10 milliGRAM(s) IV Push every 8 hours  metoprolol tartrate 25 milliGRAM(s) Oral every 12 hours  niCARdipine Infusion 5 mG/Hr (25 mL/Hr) IV Continuous <Continuous>  pantoprazole  Injectable 40 milliGRAM(s) IV Push daily  polyethylene glycol 3350 17 Gram(s) Oral daily  potassium chloride  10 mEq/50 mL IVPB 10 milliEquivalent(s) IV Intermittent every 1 hour  potassium chloride  10 mEq/50 mL IVPB 10 milliEquivalent(s) IV Intermittent every 1 hour  potassium chloride  10 mEq/50 mL IVPB 10 milliEquivalent(s) IV Intermittent every 1 hour  potassium chloride  10 mEq/50 mL IVPB 10 milliEquivalent(s) IV Intermittent every 1 hour  sodium chloride 0.9%. 1000 milliLiter(s) (10 mL/Hr) IV Continuous <Continuous>                                    12.4   17.27 )-----------( 245      ( 11 Oct 2019 00:22 )             38.5       10-11    135  |  103  |  15  ----------------------------<  157<H>  4.3   |  22  |  0.75    Ca    8.8      11 Oct 2019 00:22  Phos  3.3     10-11  Mg     1.5     10-11    TPro  5.8<L>  /  Alb  3.5  /  TBili  2.0<H>  /  DBili  x   /  AST  49<H>  /  ALT  48<H>  /  AlkPhos  54  10-11      PT/INR - ( 11 Oct 2019 00:22 )   PT: 15.2 sec;   INR: 1.31 ratio         PTT - ( 11 Oct 2019 00:22 )  PTT:27.1 sec    Mode: OFF      Daily     Daily       10-10 @ 07:01  -  10-11 @ 07:00  --------------------------------------------------------  IN: 863 mL / OUT: 1815 mL / NET: -952 mL        Critically Ill patient  : [ ] preoperative ,   [x ] post operative    Requires :  [ x] Arterial Line   [ x] Central Line  [ ] PA catheter  [ ] IABP  [ ] ECMO  [ ] LVAD  [ ] Ventilator  [x ] pacemaker [ ] Impella [x] 100% FiO2                      [x ] ABG's     [ x] Pulse Oxymetry Monitoring  Bedside evaluation , monitoring , treatment of hemodynamics , fluids , IVP/ IVCD meds.        Diagnosis:     POD Day 1 - CABG X 4 L    Hypotension a/w Hypertension, requiring intravenous medication.     Hemodynamic lability ,instability. Requires IVCD [ ] vasopressors [ ] inotropes  [ x] vasodilator  [ x]IVSS fluid  to maintain MAP, perfusion, C.I.     Carotids        50% - R - Stenosis          L - 100% Stenosis + Stent        Keep MAP > 80 for CPP    Obesity OHS / NIMESH     Chest Tube Drainage     ECG    Requires chest PT, pulmonary toilet, suctioning to maintain SaO2,  patent airway and treat atelectasis.     Smoker - increased A-a gradient      Bradycardia    Temporary pacemaker (TPM) interrogation and setting.     Requires   [ x]AII  temporary pacing at  80 min    to maintain HR, MAP, CI, and perfusion.     Old R - MCA infarct               Discussed with CT surgeon, Physician's Assistant - Nurse Practitioner- Critical care medicine team.   Dicussed at  AM / PM rounds.   Chart, labs , films reviewed.    Total Time: 20 min

## 2019-10-11 NOTE — DIETITIAN INITIAL EVALUATION ADULT. - ADD RECOMMEND
Recommend continue current diet for post-op glycemic management and heart health. Discussed with pt post-op needs for protein and adequate kcal intake, suitable meal and snack items to try; discussed lifestyle modifications for pre-DM including continues wt loss of at least 5-10% body weight, physical activity per MD discretion and as tolerated post recovery period. Pt voiced understanding.

## 2019-10-12 LAB
ANION GAP SERPL CALC-SCNC: 10 MMOL/L — SIGNIFICANT CHANGE UP (ref 5–17)
APTT BLD: 25.4 SEC — LOW (ref 27.5–36.3)
BUN SERPL-MCNC: 21 MG/DL — SIGNIFICANT CHANGE UP (ref 7–23)
CALCIUM SERPL-MCNC: 8.2 MG/DL — LOW (ref 8.4–10.5)
CHLORIDE SERPL-SCNC: 102 MMOL/L — SIGNIFICANT CHANGE UP (ref 96–108)
CO2 SERPL-SCNC: 25 MMOL/L — SIGNIFICANT CHANGE UP (ref 22–31)
CREAT SERPL-MCNC: 0.93 MG/DL — SIGNIFICANT CHANGE UP (ref 0.5–1.3)
GLUCOSE SERPL-MCNC: 117 MG/DL — HIGH (ref 70–99)
HCT VFR BLD CALC: 33 % — LOW (ref 34.5–45)
HGB BLD-MCNC: 10.8 G/DL — LOW (ref 11.5–15.5)
MCHC RBC-ENTMCNC: 24.8 PG — LOW (ref 27–34)
MCHC RBC-ENTMCNC: 32.7 GM/DL — SIGNIFICANT CHANGE UP (ref 32–36)
MCV RBC AUTO: 75.7 FL — LOW (ref 80–100)
NRBC # BLD: 0 /100 WBCS — SIGNIFICANT CHANGE UP (ref 0–0)
PLATELET # BLD AUTO: 240 K/UL — SIGNIFICANT CHANGE UP (ref 150–400)
POTASSIUM SERPL-MCNC: 4.3 MMOL/L — SIGNIFICANT CHANGE UP (ref 3.5–5.3)
POTASSIUM SERPL-SCNC: 4.3 MMOL/L — SIGNIFICANT CHANGE UP (ref 3.5–5.3)
RBC # BLD: 4.36 M/UL — SIGNIFICANT CHANGE UP (ref 3.8–5.2)
RBC # FLD: 19.5 % — HIGH (ref 10.3–14.5)
SODIUM SERPL-SCNC: 137 MMOL/L — SIGNIFICANT CHANGE UP (ref 135–145)
WBC # BLD: 20.17 K/UL — HIGH (ref 3.8–10.5)
WBC # FLD AUTO: 20.17 K/UL — HIGH (ref 3.8–10.5)

## 2019-10-12 PROCEDURE — 71045 X-RAY EXAM CHEST 1 VIEW: CPT | Mod: 26,76

## 2019-10-12 RX ORDER — CLONAZEPAM 1 MG
0.25 TABLET ORAL AT BEDTIME
Refills: 0 | Status: DISCONTINUED | OUTPATIENT
Start: 2019-10-12 | End: 2019-10-15

## 2019-10-12 RX ORDER — METOPROLOL TARTRATE 50 MG
50 TABLET ORAL EVERY 8 HOURS
Refills: 0 | Status: DISCONTINUED | OUTPATIENT
Start: 2019-10-12 | End: 2019-10-15

## 2019-10-12 RX ORDER — METOPROLOL TARTRATE 50 MG
50 TABLET ORAL EVERY 12 HOURS
Refills: 0 | Status: DISCONTINUED | OUTPATIENT
Start: 2019-10-12 | End: 2019-10-12

## 2019-10-12 RX ADMIN — Medication 100 MILLIGRAM(S): at 00:16

## 2019-10-12 RX ADMIN — OXYCODONE AND ACETAMINOPHEN 2 TABLET(S): 5; 325 TABLET ORAL at 18:08

## 2019-10-12 RX ADMIN — Medication 100 MILLIGRAM(S): at 12:30

## 2019-10-12 RX ADMIN — OXYCODONE AND ACETAMINOPHEN 2 TABLET(S): 5; 325 TABLET ORAL at 19:44

## 2019-10-12 RX ADMIN — Medication 81 MILLIGRAM(S): at 12:30

## 2019-10-12 RX ADMIN — Medication 0.25 MILLIGRAM(S): at 21:42

## 2019-10-12 RX ADMIN — CHLORHEXIDINE GLUCONATE 1 APPLICATION(S): 213 SOLUTION TOPICAL at 06:48

## 2019-10-12 RX ADMIN — OXYCODONE AND ACETAMINOPHEN 2 TABLET(S): 5; 325 TABLET ORAL at 12:30

## 2019-10-12 RX ADMIN — HYDROMORPHONE HYDROCHLORIDE 0.5 MILLIGRAM(S): 2 INJECTION INTRAMUSCULAR; INTRAVENOUS; SUBCUTANEOUS at 07:25

## 2019-10-12 RX ADMIN — Medication 10 MILLIGRAM(S): at 14:19

## 2019-10-12 RX ADMIN — Medication 50 MILLIGRAM(S): at 21:42

## 2019-10-12 RX ADMIN — HEPARIN SODIUM 5000 UNIT(S): 5000 INJECTION INTRAVENOUS; SUBCUTANEOUS at 06:49

## 2019-10-12 RX ADMIN — SODIUM CHLORIDE 3 MILLILITER(S): 9 INJECTION INTRAMUSCULAR; INTRAVENOUS; SUBCUTANEOUS at 21:33

## 2019-10-12 RX ADMIN — OXYCODONE AND ACETAMINOPHEN 2 TABLET(S): 5; 325 TABLET ORAL at 05:04

## 2019-10-12 RX ADMIN — FAMOTIDINE 20 MILLIGRAM(S): 10 INJECTION INTRAVENOUS at 12:30

## 2019-10-12 RX ADMIN — SODIUM CHLORIDE 3 MILLILITER(S): 9 INJECTION INTRAMUSCULAR; INTRAVENOUS; SUBCUTANEOUS at 13:55

## 2019-10-12 RX ADMIN — LEVETIRACETAM 1000 MILLIGRAM(S): 250 TABLET, FILM COATED ORAL at 18:08

## 2019-10-12 RX ADMIN — Medication 50 MILLIGRAM(S): at 14:19

## 2019-10-12 RX ADMIN — Medication 50 MILLIGRAM(S): at 06:49

## 2019-10-12 RX ADMIN — Medication 100 MILLIGRAM(S): at 21:43

## 2019-10-12 RX ADMIN — OXYCODONE AND ACETAMINOPHEN 2 TABLET(S): 5; 325 TABLET ORAL at 13:00

## 2019-10-12 RX ADMIN — LEVETIRACETAM 1000 MILLIGRAM(S): 250 TABLET, FILM COATED ORAL at 06:49

## 2019-10-12 RX ADMIN — OXYCODONE AND ACETAMINOPHEN 2 TABLET(S): 5; 325 TABLET ORAL at 05:34

## 2019-10-12 RX ADMIN — Medication 10 MILLIGRAM(S): at 06:49

## 2019-10-12 RX ADMIN — HEPARIN SODIUM 5000 UNIT(S): 5000 INJECTION INTRAVENOUS; SUBCUTANEOUS at 21:43

## 2019-10-12 RX ADMIN — Medication 100 MILLIGRAM(S): at 06:49

## 2019-10-12 RX ADMIN — SODIUM CHLORIDE 3 MILLILITER(S): 9 INJECTION INTRAMUSCULAR; INTRAVENOUS; SUBCUTANEOUS at 05:05

## 2019-10-12 RX ADMIN — ATORVASTATIN CALCIUM 80 MILLIGRAM(S): 80 TABLET, FILM COATED ORAL at 21:42

## 2019-10-12 RX ADMIN — POLYETHYLENE GLYCOL 3350 17 GRAM(S): 17 POWDER, FOR SOLUTION ORAL at 12:30

## 2019-10-12 NOTE — PROGRESS NOTE ADULT - PROBLEM SELECTOR PLAN 2
-minimal residual s/e-->slightly dec  strength on left   -Plavix will be re-started [place] (10/12) per Dr. Rivera   -1g Keppra for seizure prophylaxis post-CVA in 6/19 -minimal residual s/e-->slightly dec  strength on left   -Plavix  re-started per Dr. Rivera   [facility]

## 2019-10-12 NOTE — PROGRESS NOTE ADULT - ASSESSMENT
53yr old female presents for a CABG. TTE demonstrated severely dilated left atrium, severe diastolic dysfunction and cardiac cath showed triple vessel disease. Pt w/ hx of Anxiety, Hypertension, Hyperlipidemia, Smoking (1ppd >30 yrs; quit in 6/2019), and recent Right MCA infarct (6/19) s/p SANDIE stent w/ Dr. Barrios.  10/10: Pt underwent CABGx4 with Dr. Rivera. CTU-extubated, dc'd pressors, no products in OR.   10/11: Pt transferred to SDU in stable condition. Dc'd mitchell and right femoral A line prior to arrival to unit-sandbag on right groin.   Will restart Plavix tomorrow for hx of recent CVA per Dr. Rivera.   Mediastinal and left pleural CT in place with minimal drainage.   PW in place VVI 40, MA 10.   10/12 vss - hr 87 will increase BB - Lpl CT & med CT w/ low output - CTs removed - will ck cxr- ij d/c'd as wbc 20.  afebrile incisions cdi. will monitor  Discharge planning-pending PT recommendations. 53yr old female presents for a CABG. TTE demonstrated severely dilated left atrium, severe diastolic dysfunction and cardiac cath showed triple vessel disease. Pt w/ hx of Anxiety, Hypertension, Hyperlipidemia, Smoking (1ppd >30 yrs; quit in 6/2019), and recent Right MCA infarct (6/19) s/p SANDIE stent w/ Dr. Barrios.  10/10: Pt underwent CABGx4 with Dr. Rivera. CTU-extubated, dc'd pressors, no products in OR.   10/11: Pt transferred to SDU in stable condition. Dc'd mitchell and right femoral A line prior to arrival to unit-sandbag on right groin.   Will restart Plavix tomorrow for hx of recent CVA per Dr. Rivera.   Mediastinal and left pleural CT in place with minimal drainage.   PW in place VVI 40, MA 10.   10/12 vss - hr 87 will increase BB - Lpl CT & med CT w/ low output - CTs removed - will ck cxr- ij d/c'd as wbc 20.  afebrile incisions cdi. will monitor  plavix resumed for h/o carotid stent  Discharge planning-pending PT recommendations.

## 2019-10-12 NOTE — PROGRESS NOTE ADULT - SUBJECTIVE AND OBJECTIVE BOX
VITAL SIGNS-Telemetry:  SR 87  Vital Signs Last 24 Hrs  T(C): 36.7 (10-12-19 @ 03:19), Max: 37.2 (10-11-19 @ 12:00)  T(F): 98.1 (10-12-19 @ 03:19), Max: 99 (10-11-19 @ 12:00)  HR: 90 (10-12-19 @ 08:00) (76 - 98)  BP: 124/70 (10-12-19 @ 05:20) (90/56 - 136/77)  RR: 17 (10-12-19 @ 03:19) (15 - 18)  SpO2: 98% (10-12-19 @ 03:19) (95% - 98%)         10-11 @ 07:01  -  10-12 @ 07:00  --------------------------------------------------------  IN: 1004.5 mL / OUT: 2445 mL / NET: -1440.5 mL    10-12 @ 07:01  -  10-12 @ 11:17  --------------------------------------------------------  IN: 0 mL / OUT: 10 mL / NET: -10 mL     Daily     Daily Weight in k.5 (12 Oct 2019 06:55)    CAPILLARY BLOOD GLUCOSE  155 (11 Oct 2019 12:00)  POCT Blood Glucose.: 141 mg/dL (11 Oct 2019 16:59)  POCT Blood Glucose.: 137 mg/dL (11 Oct 2019 13:10)        Pacing Wires        [  ]   Settings:                                  Isolated  [  ]  Coumadin    [ ] YES          [  ]      NO         Reason:       PHYSICAL EXAM:  Neurology: alert and oriented x 3, nonfocal, no gross deficits  CV : S1S2  Sternal Wound :  CDI , Stable  Lungs: Cta diminished bases  Abdomen: soft, nontender, nondistended, positive bowel sounds, last bowel movement       pre-op  Extremities:     tr. edema leg inc cdi no calf tenderness    aspirin enteric coated 81 milliGRAM(s) Oral daily  atorvastatin 80 milliGRAM(s) Oral at bedtime  chlorhexidine 2% Cloths 1 Application(s) Topical <User Schedule>  docusate sodium 100 milliGRAM(s) Oral three times a day  famotidine    Tablet 20 milliGRAM(s) Oral daily  heparin  Injectable 5000 Unit(s) SubCutaneous every 8 hours  levETIRAcetam 1000 milliGRAM(s) Oral two times a day  metoclopramide Injectable 10 milliGRAM(s) IV Push every 8 hours  metoprolol tartrate 50 milliGRAM(s) Oral every 12 hours  oxyCODONE    5 mG/acetaminophen 325 mG 1 Tablet(s) Oral every 6 hours PRN  oxyCODONE    5 mG/acetaminophen 325 mG 2 Tablet(s) Oral every 6 hours PRN  polyethylene glycol 3350 17 Gram(s) Oral daily  sodium chloride 0.9% lock flush 3 milliLiter(s) IV Push every 8 hours  sodium chloride 0.9%. 1000 milliLiter(s) IV Continuous <Continuous>      Physical Therapy Rec:   Home  [  ]   Home w/ PT  [ x ]  Rehab  [  ]  Discussed with Cardiothoracic Team at AM rounds.

## 2019-10-13 LAB
ANION GAP SERPL CALC-SCNC: 8 MMOL/L — SIGNIFICANT CHANGE UP (ref 5–17)
BUN SERPL-MCNC: 21 MG/DL — SIGNIFICANT CHANGE UP (ref 7–23)
CALCIUM SERPL-MCNC: 8.7 MG/DL — SIGNIFICANT CHANGE UP (ref 8.4–10.5)
CHLORIDE SERPL-SCNC: 103 MMOL/L — SIGNIFICANT CHANGE UP (ref 96–108)
CO2 SERPL-SCNC: 27 MMOL/L — SIGNIFICANT CHANGE UP (ref 22–31)
CREAT SERPL-MCNC: 0.81 MG/DL — SIGNIFICANT CHANGE UP (ref 0.5–1.3)
GLUCOSE SERPL-MCNC: 107 MG/DL — HIGH (ref 70–99)
HCT VFR BLD CALC: 32.9 % — LOW (ref 34.5–45)
HGB BLD-MCNC: 10.6 G/DL — LOW (ref 11.5–15.5)
MCHC RBC-ENTMCNC: 24.4 PG — LOW (ref 27–34)
MCHC RBC-ENTMCNC: 32.2 GM/DL — SIGNIFICANT CHANGE UP (ref 32–36)
MCV RBC AUTO: 75.6 FL — LOW (ref 80–100)
NRBC # BLD: 0 /100 WBCS — SIGNIFICANT CHANGE UP (ref 0–0)
PLATELET # BLD AUTO: 216 K/UL — SIGNIFICANT CHANGE UP (ref 150–400)
POTASSIUM SERPL-MCNC: 4.3 MMOL/L — SIGNIFICANT CHANGE UP (ref 3.5–5.3)
POTASSIUM SERPL-SCNC: 4.3 MMOL/L — SIGNIFICANT CHANGE UP (ref 3.5–5.3)
RBC # BLD: 4.35 M/UL — SIGNIFICANT CHANGE UP (ref 3.8–5.2)
RBC # FLD: 19.3 % — HIGH (ref 10.3–14.5)
SODIUM SERPL-SCNC: 138 MMOL/L — SIGNIFICANT CHANGE UP (ref 135–145)
WBC # BLD: 12.67 K/UL — HIGH (ref 3.8–10.5)
WBC # FLD AUTO: 12.67 K/UL — HIGH (ref 3.8–10.5)

## 2019-10-13 PROCEDURE — 71045 X-RAY EXAM CHEST 1 VIEW: CPT | Mod: 26

## 2019-10-13 RX ORDER — FUROSEMIDE 40 MG
40 TABLET ORAL ONCE
Refills: 0 | Status: COMPLETED | OUTPATIENT
Start: 2019-10-13 | End: 2019-10-13

## 2019-10-13 RX ORDER — POTASSIUM CHLORIDE 20 MEQ
20 PACKET (EA) ORAL ONCE
Refills: 0 | Status: COMPLETED | OUTPATIENT
Start: 2019-10-13 | End: 2019-10-13

## 2019-10-13 RX ADMIN — OXYCODONE AND ACETAMINOPHEN 2 TABLET(S): 5; 325 TABLET ORAL at 04:06

## 2019-10-13 RX ADMIN — CHLORHEXIDINE GLUCONATE 1 APPLICATION(S): 213 SOLUTION TOPICAL at 06:14

## 2019-10-13 RX ADMIN — SODIUM CHLORIDE 3 MILLILITER(S): 9 INJECTION INTRAMUSCULAR; INTRAVENOUS; SUBCUTANEOUS at 15:28

## 2019-10-13 RX ADMIN — Medication 0.25 MILLIGRAM(S): at 21:50

## 2019-10-13 RX ADMIN — Medication 81 MILLIGRAM(S): at 11:10

## 2019-10-13 RX ADMIN — HYDROMORPHONE HYDROCHLORIDE 0.5 MILLIGRAM(S): 2 INJECTION INTRAMUSCULAR; INTRAVENOUS; SUBCUTANEOUS at 07:55

## 2019-10-13 RX ADMIN — Medication 50 MILLIGRAM(S): at 06:13

## 2019-10-13 RX ADMIN — Medication 100 MILLIGRAM(S): at 21:49

## 2019-10-13 RX ADMIN — POLYETHYLENE GLYCOL 3350 17 GRAM(S): 17 POWDER, FOR SOLUTION ORAL at 11:10

## 2019-10-13 RX ADMIN — HEPARIN SODIUM 5000 UNIT(S): 5000 INJECTION INTRAVENOUS; SUBCUTANEOUS at 21:49

## 2019-10-13 RX ADMIN — SODIUM CHLORIDE 3 MILLILITER(S): 9 INJECTION INTRAMUSCULAR; INTRAVENOUS; SUBCUTANEOUS at 21:07

## 2019-10-13 RX ADMIN — SODIUM CHLORIDE 3 MILLILITER(S): 9 INJECTION INTRAMUSCULAR; INTRAVENOUS; SUBCUTANEOUS at 06:07

## 2019-10-13 RX ADMIN — LEVETIRACETAM 1000 MILLIGRAM(S): 250 TABLET, FILM COATED ORAL at 17:09

## 2019-10-13 RX ADMIN — OXYCODONE AND ACETAMINOPHEN 2 TABLET(S): 5; 325 TABLET ORAL at 10:50

## 2019-10-13 RX ADMIN — FAMOTIDINE 20 MILLIGRAM(S): 10 INJECTION INTRAVENOUS at 11:10

## 2019-10-13 RX ADMIN — OXYCODONE AND ACETAMINOPHEN 2 TABLET(S): 5; 325 TABLET ORAL at 03:36

## 2019-10-13 RX ADMIN — Medication 100 MILLIGRAM(S): at 11:10

## 2019-10-13 RX ADMIN — HEPARIN SODIUM 5000 UNIT(S): 5000 INJECTION INTRAVENOUS; SUBCUTANEOUS at 06:13

## 2019-10-13 RX ADMIN — HEPARIN SODIUM 5000 UNIT(S): 5000 INJECTION INTRAVENOUS; SUBCUTANEOUS at 13:53

## 2019-10-13 RX ADMIN — Medication 100 MILLIGRAM(S): at 06:13

## 2019-10-13 RX ADMIN — Medication 20 MILLIEQUIVALENT(S): at 11:56

## 2019-10-13 RX ADMIN — OXYCODONE AND ACETAMINOPHEN 2 TABLET(S): 5; 325 TABLET ORAL at 17:09

## 2019-10-13 RX ADMIN — ATORVASTATIN CALCIUM 80 MILLIGRAM(S): 80 TABLET, FILM COATED ORAL at 21:49

## 2019-10-13 RX ADMIN — LEVETIRACETAM 1000 MILLIGRAM(S): 250 TABLET, FILM COATED ORAL at 06:13

## 2019-10-13 RX ADMIN — Medication 50 MILLIGRAM(S): at 13:53

## 2019-10-13 RX ADMIN — OXYCODONE AND ACETAMINOPHEN 2 TABLET(S): 5; 325 TABLET ORAL at 11:20

## 2019-10-13 RX ADMIN — Medication 40 MILLIGRAM(S): at 11:57

## 2019-10-13 RX ADMIN — OXYCODONE AND ACETAMINOPHEN 2 TABLET(S): 5; 325 TABLET ORAL at 17:39

## 2019-10-13 RX ADMIN — Medication 50 MILLIGRAM(S): at 21:49

## 2019-10-13 NOTE — PROGRESS NOTE ADULT - PROBLEM SELECTOR PLAN 2
-minimal residual s/e-->slightly dec  strength on left   -Plavix  re-started per Dr. Rivera   [facility]

## 2019-10-13 NOTE — PROGRESS NOTE ADULT - SUBJECTIVE AND OBJECTIVE BOX
VITAL SIGNS-Telemetry:  SR 70-80  Vital Signs Last 24 Hrs  T(C): 36.6 (10-13-19 @ 05:24), Max: 36.9 (10-12-19 @ 15:21)  T(F): 97.8 (10-13-19 @ 05:24), Max: 98.5 (10-12-19 @ 15:21)  HR: 87 (10-13-19 @ 05:24) (78 - 100)  BP: 139/70 (10-13-19 @ 05:24) (98/53 - 139/70)  RR: 18 (10-13-19 @ 05:24) (18 - 20)  SpO2: 93% (10-13-19 @ 05:24) (90% - 93%)         10-12 @ 07:01  -  10-13 @ 07:00  --------------------------------------------------------  IN: 600 mL / OUT: 1410 mL / NET: -810 mL    10-13 @ 07:01  -  10-13 @ 10:59  --------------------------------------------------------  IN: 240 mL / OUT: 0 mL / NET: 240 mL    Daily     Daily Weight in k.2 (13 Oct 2019 05:24)    Pacing Wires        [  ]   Settings:                                  Isolated  [ x ]  Coumadin    [ ] YES          [  ]      NO         Reason:       PHYSICAL EXAM:  Neurology: alert and oriented x 3, nonfocal, no gross deficits  CV :S1S2  Sternal Wound :  CDI , Stable  Lungs: CTA  Abdomen: soft, nontender, nondistended, positive bowel sounds, last bowel movement     pre-op    Extremities:     + edema b/l leg incisions CDI     aspirin enteric coated 81 milliGRAM(s) Oral daily  atorvastatin 80 milliGRAM(s) Oral at bedtime  chlorhexidine 2% Cloths 1 Application(s) Topical <User Schedule>  clonazePAM  Tablet 0.25 milliGRAM(s) Oral at bedtime PRN  docusate sodium 100 milliGRAM(s) Oral three times a day  famotidine    Tablet 20 milliGRAM(s) Oral daily  heparin  Injectable 5000 Unit(s) SubCutaneous every 8 hours  levETIRAcetam 1000 milliGRAM(s) Oral two times a day  metoprolol tartrate 50 milliGRAM(s) Oral every 8 hours  oxyCODONE    5 mG/acetaminophen 325 mG 1 Tablet(s) Oral every 6 hours PRN  oxyCODONE    5 mG/acetaminophen 325 mG 2 Tablet(s) Oral every 6 hours PRN  polyethylene glycol 3350 17 Gram(s) Oral daily  sodium chloride 0.9% lock flush 3 milliLiter(s) IV Push every 8 hours    Physical Therapy Rec:   Home  [  ]   Home w/ PT  [ x ]  Rehab  [  ]  Discussed with Cardiothoracic Team at AM rounds. VITAL SIGNS-Telemetry:  SR 70-80  Vital Signs Last 24 Hrs  T(C): 36.6 (10-13-19 @ 05:24), Max: 36.9 (10-12-19 @ 15:21)  T(F): 97.8 (10-13-19 @ 05:24), Max: 98.5 (10-12-19 @ 15:21)  HR: 87 (10-13-19 @ 05:24) (78 - 100)  BP: 139/70 (10-13-19 @ 05:24) (98/53 - 139/70)  RR: 18 (10-13-19 @ 05:24) (18 - 20)  SpO2: 93% (10-13-19 @ 05:24) (90% - 93%)         10-12 @ 07:01  -  10-13 @ 07:00  --------------------------------------------------------  IN: 600 mL / OUT: 1410 mL / NET: -810 mL    10-13 @ 07:01  -  10-13 @ 10:59  --------------------------------------------------------  IN: 240 mL / OUT: 0 mL / NET: 240 mL    Daily     Daily Weight in k.2 (13 Oct 2019 05:24)    Pacing Wires        [  ]   Settings:                                  Isolated  [ x ]       PHYSICAL EXAM:  Neurology: alert and oriented x 3, nonfocal, no gross deficits  CV :S1S2  Sternal Wound :  CDI , Stable  Lungs: CTA  Abdomen: soft, nontender, nondistended, positive bowel sounds, last bowel movement     pre-op    Extremities:     + edema b/l leg incisions CDI no calf tenderness    aspirin enteric coated 81 milliGRAM(s) Oral daily  atorvastatin 80 milliGRAM(s) Oral at bedtime  chlorhexidine 2% Cloths 1 Application(s) Topical <User Schedule>  clonazePAM  Tablet 0.25 milliGRAM(s) Oral at bedtime PRN  docusate sodium 100 milliGRAM(s) Oral three times a day  famotidine    Tablet 20 milliGRAM(s) Oral daily  heparin  Injectable 5000 Unit(s) SubCutaneous every 8 hours  levETIRAcetam 1000 milliGRAM(s) Oral two times a day  metoprolol tartrate 50 milliGRAM(s) Oral every 8 hours  oxyCODONE    5 mG/acetaminophen 325 mG 1 Tablet(s) Oral every 6 hours PRN  oxyCODONE    5 mG/acetaminophen 325 mG 2 Tablet(s) Oral every 6 hours PRN  polyethylene glycol 3350 17 Gram(s) Oral daily  sodium chloride 0.9% lock flush 3 milliLiter(s) IV Push every 8 hours    Physical Therapy Rec:   Home  [  ]   Home w/ PT  [ x ]  Rehab  [  ]  Discussed with Cardiothoracic Team at AM rounds.

## 2019-10-13 NOTE — PROGRESS NOTE ADULT - ASSESSMENT
53yr old female presents for a CABG. TTE demonstrated severely dilated left atrium, severe diastolic dysfunction and cardiac cath showed triple vessel disease. Pt w/ hx of Anxiety, Hypertension, Hyperlipidemia, Smoking (1ppd >30 yrs; quit in 6/2019), and recent Right MCA infarct (6/19) s/p SANDIE stent w/ Dr. Barrios.  10/10: Pt underwent CABGx4 with Dr. Rivera. CTU-extubated, dc'd pressors, no products in OR.   10/11: Pt transferred to SDU in stable condition. Dc'd mitchell and right femoral A line prior to arrival to unit-sandbag on right groin.   Will restart Plavix tomorrow for hx of recent CVA per Dr. Rivera.   Mediastinal and left pleural CT in place with minimal drainage.   PW in place VVI 40, MA 10.   10/12 vss - hr 87 will increase BB - Lpl CT & med CT w/ low output - CTs removed - will ck cxr- ij d/c'd as wbc 20.  afebrile incisions cdi. will monitor  plavix resumed for h/o carotid stent  10/13 VSS pt ambulating -+ edema b/l - lasix 40 po daily x 3 days ordered- on 50 tid of lopressor - can transfer to floor - rounds made w/ dr. husain covering dr. rivera  Discharge planning-pending PT recommendations. 53yr old female presents for a CABG. TTE demonstrated severely dilated left atrium, severe diastolic dysfunction and cardiac cath showed triple vessel disease. Pt w/ hx of Anxiety, Hypertension, Hyperlipidemia, Smoking (1ppd >30 yrs; quit in 6/2019), and recent Right MCA infarct (6/19) s/p SANDIE stent w/ Dr. Barrios.  10/10: Pt underwent CABGx4 with Dr. Rivera. CTU-extubated, dc'd pressors, no products in OR.   10/11: Pt transferred to SDU in stable condition. Dc'd mitchell and right femoral A line prior to arrival to unit-sandbag on right groin.   Will restart Plavix tomorrow for hx of recent CVA per Dr. Rivera.   Mediastinal and left pleural CT in place with minimal drainage.   PW in place VVI 40, MA 10.   10/12 vss - hr 87 will increase BB - Lpl CT & med CT w/ low output - CTs removed - will ck cxr- ij d/c'd as wbc 20.  afebrile incisions cdi. will monitor  plavix resumed for h/o carotid stent  10/13 VSS pt ambulating -+ edema b/l - lasix 40 po x 1 for +edema on 50 tid of lopressor - can transfer to floor - rounds made w/ dr. husain covering dr. rivera  Discharge planning-pending PT recommendations.

## 2019-10-14 LAB
ANION GAP SERPL CALC-SCNC: 10 MMOL/L — SIGNIFICANT CHANGE UP (ref 5–17)
BUN SERPL-MCNC: 17 MG/DL — SIGNIFICANT CHANGE UP (ref 7–23)
CALCIUM SERPL-MCNC: 8.9 MG/DL — SIGNIFICANT CHANGE UP (ref 8.4–10.5)
CHLORIDE SERPL-SCNC: 97 MMOL/L — SIGNIFICANT CHANGE UP (ref 96–108)
CO2 SERPL-SCNC: 29 MMOL/L — SIGNIFICANT CHANGE UP (ref 22–31)
CREAT SERPL-MCNC: 0.77 MG/DL — SIGNIFICANT CHANGE UP (ref 0.5–1.3)
GLUCOSE SERPL-MCNC: 99 MG/DL — SIGNIFICANT CHANGE UP (ref 70–99)
HCT VFR BLD CALC: 32.3 % — LOW (ref 34.5–45)
HGB BLD-MCNC: 10.1 G/DL — LOW (ref 11.5–15.5)
MCHC RBC-ENTMCNC: 23.8 PG — LOW (ref 27–34)
MCHC RBC-ENTMCNC: 31.3 GM/DL — LOW (ref 32–36)
MCV RBC AUTO: 76 FL — LOW (ref 80–100)
NRBC # BLD: 0 /100 WBCS — SIGNIFICANT CHANGE UP (ref 0–0)
PLATELET # BLD AUTO: 250 K/UL — SIGNIFICANT CHANGE UP (ref 150–400)
POTASSIUM SERPL-MCNC: 4.6 MMOL/L — SIGNIFICANT CHANGE UP (ref 3.5–5.3)
POTASSIUM SERPL-SCNC: 4.6 MMOL/L — SIGNIFICANT CHANGE UP (ref 3.5–5.3)
RBC # BLD: 4.25 M/UL — SIGNIFICANT CHANGE UP (ref 3.8–5.2)
RBC # FLD: 18.7 % — HIGH (ref 10.3–14.5)
SODIUM SERPL-SCNC: 136 MMOL/L — SIGNIFICANT CHANGE UP (ref 135–145)
WBC # BLD: 11.48 K/UL — HIGH (ref 3.8–10.5)
WBC # FLD AUTO: 11.48 K/UL — HIGH (ref 3.8–10.5)

## 2019-10-14 RX ORDER — CLOPIDOGREL BISULFATE 75 MG/1
75 TABLET, FILM COATED ORAL DAILY
Refills: 0 | Status: DISCONTINUED | OUTPATIENT
Start: 2019-10-14 | End: 2019-10-15

## 2019-10-14 RX ORDER — OXYCODONE HYDROCHLORIDE 5 MG/1
10 TABLET ORAL EVERY 4 HOURS
Refills: 0 | Status: DISCONTINUED | OUTPATIENT
Start: 2019-10-14 | End: 2019-10-15

## 2019-10-14 RX ORDER — OXYCODONE HYDROCHLORIDE 5 MG/1
5 TABLET ORAL EVERY 4 HOURS
Refills: 0 | Status: DISCONTINUED | OUTPATIENT
Start: 2019-10-14 | End: 2019-10-15

## 2019-10-14 RX ADMIN — Medication 50 MILLIGRAM(S): at 21:13

## 2019-10-14 RX ADMIN — CLOPIDOGREL BISULFATE 75 MILLIGRAM(S): 75 TABLET, FILM COATED ORAL at 11:59

## 2019-10-14 RX ADMIN — OXYCODONE HYDROCHLORIDE 10 MILLIGRAM(S): 5 TABLET ORAL at 16:32

## 2019-10-14 RX ADMIN — Medication 100 MILLIGRAM(S): at 13:58

## 2019-10-14 RX ADMIN — Medication 100 MILLIGRAM(S): at 06:05

## 2019-10-14 RX ADMIN — OXYCODONE HYDROCHLORIDE 10 MILLIGRAM(S): 5 TABLET ORAL at 17:30

## 2019-10-14 RX ADMIN — LEVETIRACETAM 1000 MILLIGRAM(S): 250 TABLET, FILM COATED ORAL at 06:05

## 2019-10-14 RX ADMIN — Medication 81 MILLIGRAM(S): at 12:00

## 2019-10-14 RX ADMIN — Medication 50 MILLIGRAM(S): at 06:05

## 2019-10-14 RX ADMIN — SODIUM CHLORIDE 3 MILLILITER(S): 9 INJECTION INTRAMUSCULAR; INTRAVENOUS; SUBCUTANEOUS at 06:05

## 2019-10-14 RX ADMIN — ATORVASTATIN CALCIUM 80 MILLIGRAM(S): 80 TABLET, FILM COATED ORAL at 21:13

## 2019-10-14 RX ADMIN — Medication 100 MILLIGRAM(S): at 21:13

## 2019-10-14 RX ADMIN — POLYETHYLENE GLYCOL 3350 17 GRAM(S): 17 POWDER, FOR SOLUTION ORAL at 12:00

## 2019-10-14 RX ADMIN — SODIUM CHLORIDE 3 MILLILITER(S): 9 INJECTION INTRAMUSCULAR; INTRAVENOUS; SUBCUTANEOUS at 21:14

## 2019-10-14 RX ADMIN — OXYCODONE HYDROCHLORIDE 10 MILLIGRAM(S): 5 TABLET ORAL at 21:44

## 2019-10-14 RX ADMIN — OXYCODONE AND ACETAMINOPHEN 2 TABLET(S): 5; 325 TABLET ORAL at 04:42

## 2019-10-14 RX ADMIN — HEPARIN SODIUM 5000 UNIT(S): 5000 INJECTION INTRAVENOUS; SUBCUTANEOUS at 13:57

## 2019-10-14 RX ADMIN — CHLORHEXIDINE GLUCONATE 1 APPLICATION(S): 213 SOLUTION TOPICAL at 06:45

## 2019-10-14 RX ADMIN — OXYCODONE AND ACETAMINOPHEN 2 TABLET(S): 5; 325 TABLET ORAL at 11:50

## 2019-10-14 RX ADMIN — OXYCODONE AND ACETAMINOPHEN 2 TABLET(S): 5; 325 TABLET ORAL at 10:54

## 2019-10-14 RX ADMIN — SODIUM CHLORIDE 3 MILLILITER(S): 9 INJECTION INTRAMUSCULAR; INTRAVENOUS; SUBCUTANEOUS at 13:52

## 2019-10-14 RX ADMIN — LEVETIRACETAM 1000 MILLIGRAM(S): 250 TABLET, FILM COATED ORAL at 17:18

## 2019-10-14 RX ADMIN — HEPARIN SODIUM 5000 UNIT(S): 5000 INJECTION INTRAVENOUS; SUBCUTANEOUS at 06:06

## 2019-10-14 RX ADMIN — FAMOTIDINE 20 MILLIGRAM(S): 10 INJECTION INTRAVENOUS at 12:00

## 2019-10-14 RX ADMIN — OXYCODONE HYDROCHLORIDE 10 MILLIGRAM(S): 5 TABLET ORAL at 21:14

## 2019-10-14 RX ADMIN — HEPARIN SODIUM 5000 UNIT(S): 5000 INJECTION INTRAVENOUS; SUBCUTANEOUS at 21:13

## 2019-10-14 RX ADMIN — Medication 50 MILLIGRAM(S): at 13:57

## 2019-10-14 NOTE — PROGRESS NOTE ADULT - ASSESSMENT
53yr old female presents for a CABG. TTE demonstrated severely dilated left atrium, severe diastolic dysfunction and cardiac cath showed triple vessel disease. Pt w/ hx of Anxiety, Hypertension, Hyperlipidemia, Smoking (1ppd >30 yrs; quit in 6/2019), and recent Right MCA infarct (6/19) s/p SANDIE stent w/ Dr. Barrios.  10/10: Pt underwent CABGx4 with Dr. Rivera. CTU-extubated, dc'd pressors, no products in OR.   10/11: Pt transferred to SDU in stable condition. Dc'd mitchell and right femoral A line prior to arrival to unit-sandbag on right groin.   Will restart Plavix tomorrow for hx of recent CVA per Dr. Rivera.   Mediastinal and left pleural CT in place with minimal drainage.   PW in place VVI 40, MA 10.   10/12 vss - hr 87 will increase BB - Lpl CT & med CT w/ low output - CTs removed - will ck cxr- ij d/c'd as wbc 20.  afebrile incisions cdi. will monitor  plavix resumed for h/o carotid stent  10/13 VSS pt ambulating -+ edema b/l - lasix 40 po x 1 for +edema on 50 tid of lopressor - can transfer to floor - rounds made w/ dr. husain covering dr. rivera  Discharge planning-pending PT recommendations.   10/14 Pt Progressing well, plavix resumed.  D/c planning tomorrow, pw to be cut

## 2019-10-14 NOTE — PROGRESS NOTE ADULT - PROBLEM SELECTOR PLAN 1
-ASA, statin, increase BB  -pulmonary toilet  -pain management  -activity as tolerated  -shower POD 5  -discharge planning: pending PT rec
-ASA, statin, BB  -pulmonary toilet  -pain management  -activity as tolerated  -shower POD 5  -discharge planning: pending PT rec
-ASA, statin, increase BB  -pulmonary toilet  -pain management  lasix 40 x 3 days  -activity as tolerated  -shower POD 5  -discharge planning: pending PT rec
-ASA, statin, increase BB  -pulmonary toilet  -pain management  lasix 40 x 3 days  -activity as tolerated  -shower POD 5  -discharge planning: pending PT rec

## 2019-10-14 NOTE — PROGRESS NOTE ADULT - PROBLEM SELECTOR PLAN 2
-minimal residual s/e-->slightly dec  strength on left   -Plavix  re-started per Dr. Rivera   [facility]

## 2019-10-14 NOTE — PROGRESS NOTE ADULT - PROBLEM SELECTOR PLAN 3
-DVT-->ASA, Hep  -GI-->PPI

## 2019-10-14 NOTE — PROGRESS NOTE ADULT - PROBLEM SELECTOR PROBLEM 2
History of ischemic right MCA stroke

## 2019-10-14 NOTE — PROGRESS NOTE ADULT - SUBJECTIVE AND OBJECTIVE BOX
im ok    VITAL SIGNS    Telemetry:  nsr  Vital Signs Last 24 Hrs  T(C): 36.4 (10-14-19 @ 14:00), Max: 36.7 (10-13-19 @ 23:15)  T(F): 97.6 (10-14-19 @ 14:00), Max: 98.1 (10-14-19 @ 06:00)  HR: 80 (10-14-19 @ 14:) (75 - 90)  BP: 103/56 (10-14-19 @ 14:00) (101/63 - 118/55)  RR: 16 (10-14-19 @ 14:00) (16 - 20)  SpO2: 96% (10-14-19 @ 14:00) (92% - 96%)                       10.1<L>                136  | 29   | 17           11.48<H> >-----------< 250     ------------------------< 99                    32.3<L>                4.6  | 97   | 0.77                                                                      Ca 8.9   Mg x     Ph x        ,             10.6<L>                138  | 27   | 21           12.67<H> >-----------< 216     ------------------------< 107<H>                 32.9<L>                4.3  | 103  | 0.81                                                                      Ca 8.7   Mg x     Ph x                10-13-19 @ 07:  -  10-14-19 @ 07:00  --------------------------------------------------------  IN: 1070 mL / OUT: 1600 mL / NET: -530 mL    10-14-19 @ 07:01  -  10-14-19 @ 14:38  --------------------------------------------------------  IN: 600 mL / OUT: 450 mL / NET: 150 mL    10- @ 05:28  PT-- INR--  PTT25.4  10- @ 00:22  PT15.2 INR1.31  PTT27.1  10-10 @ 14:41  PT15.6 INR1.34  PTT26.7      Daily     Daily Weight in k.9 (14 Oct 2019 06:00)        CAPILLARY BLOOD GLUCOSE                    Coumadin    [ ] YES          [ x ]      NO                                   PHYSICAL EXAM        Neurology: alert and oriented x 3, nonfocal, no gross deficits  CV : .S1S2 RRR  Sternal Wound :  CDI , Stable  Pacing Wires        [  ]   Settings:                                  Isolated  [ x ]  Lungs: bibasilar crackles   Abdomen: soft, nontender, nondistended, positive bowel sound  :         voiding    Extremities:    trace__ edema, _-__calf tenderness.                           bilat __svg site cdi          aspirin enteric coated 81 milliGRAM(s) Oral daily  atorvastatin 80 milliGRAM(s) Oral at bedtime  chlorhexidine 2% Cloths 1 Application(s) Topical <User Schedule>  clonazePAM  Tablet 0.25 milliGRAM(s) Oral at bedtime PRN  clopidogrel Tablet 75 milliGRAM(s) Oral daily  docusate sodium 100 milliGRAM(s) Oral three times a day  famotidine    Tablet 20 milliGRAM(s) Oral daily  heparin  Injectable 5000 Unit(s) SubCutaneous every 8 hours  levETIRAcetam 1000 milliGRAM(s) Oral two times a day  metoprolol tartrate 50 milliGRAM(s) Oral every 8 hours  oxyCODONE    5 mG/acetaminophen 325 mG 1 Tablet(s) Oral every 6 hours PRN  oxyCODONE    5 mG/acetaminophen 325 mG 2 Tablet(s) Oral every 6 hours PRN  polyethylene glycol 3350 17 Gram(s) Oral daily  sodium chloride 0.9% lock flush 3 milliLiter(s) IV Push every 8 hours                    Physical Therapy Rec:   Home  [  ]   Home w/ PT  [  ]  Rehab  [  ]  Discussed with Cardiothoracic Team at AM rounds.

## 2019-10-15 ENCOUNTER — TRANSCRIPTION ENCOUNTER (OUTPATIENT)
Age: 54
End: 2019-10-15

## 2019-10-15 VITALS
TEMPERATURE: 97 F | DIASTOLIC BLOOD PRESSURE: 57 MMHG | RESPIRATION RATE: 16 BRPM | OXYGEN SATURATION: 98 % | HEART RATE: 93 BPM | SYSTOLIC BLOOD PRESSURE: 104 MMHG

## 2019-10-15 LAB
ANION GAP SERPL CALC-SCNC: 8 MMOL/L — SIGNIFICANT CHANGE UP (ref 5–17)
BUN SERPL-MCNC: 17 MG/DL — SIGNIFICANT CHANGE UP (ref 7–23)
CALCIUM SERPL-MCNC: 9.3 MG/DL — SIGNIFICANT CHANGE UP (ref 8.4–10.5)
CHLORIDE SERPL-SCNC: 97 MMOL/L — SIGNIFICANT CHANGE UP (ref 96–108)
CO2 SERPL-SCNC: 29 MMOL/L — SIGNIFICANT CHANGE UP (ref 22–31)
CREAT SERPL-MCNC: 0.79 MG/DL — SIGNIFICANT CHANGE UP (ref 0.5–1.3)
GLUCOSE SERPL-MCNC: 109 MG/DL — HIGH (ref 70–99)
HCT VFR BLD CALC: 33.1 % — LOW (ref 34.5–45)
HGB BLD-MCNC: 10.3 G/DL — LOW (ref 11.5–15.5)
MCHC RBC-ENTMCNC: 24.1 PG — LOW (ref 27–34)
MCHC RBC-ENTMCNC: 31.1 GM/DL — LOW (ref 32–36)
MCV RBC AUTO: 77.3 FL — LOW (ref 80–100)
NRBC # BLD: 0 /100 WBCS — SIGNIFICANT CHANGE UP (ref 0–0)
PLATELET # BLD AUTO: 276 K/UL — SIGNIFICANT CHANGE UP (ref 150–400)
POTASSIUM SERPL-MCNC: 4.3 MMOL/L — SIGNIFICANT CHANGE UP (ref 3.5–5.3)
POTASSIUM SERPL-SCNC: 4.3 MMOL/L — SIGNIFICANT CHANGE UP (ref 3.5–5.3)
RBC # BLD: 4.28 M/UL — SIGNIFICANT CHANGE UP (ref 3.8–5.2)
RBC # FLD: 18.6 % — HIGH (ref 10.3–14.5)
SODIUM SERPL-SCNC: 134 MMOL/L — LOW (ref 135–145)
WBC # BLD: 10.19 K/UL — SIGNIFICANT CHANGE UP (ref 3.8–10.5)
WBC # FLD AUTO: 10.19 K/UL — SIGNIFICANT CHANGE UP (ref 3.8–10.5)

## 2019-10-15 PROCEDURE — 80053 COMPREHEN METABOLIC PANEL: CPT

## 2019-10-15 PROCEDURE — C1889: CPT

## 2019-10-15 PROCEDURE — 85730 THROMBOPLASTIN TIME PARTIAL: CPT

## 2019-10-15 PROCEDURE — 82962 GLUCOSE BLOOD TEST: CPT

## 2019-10-15 PROCEDURE — 85014 HEMATOCRIT: CPT

## 2019-10-15 PROCEDURE — 85610 PROTHROMBIN TIME: CPT

## 2019-10-15 PROCEDURE — 94002 VENT MGMT INPAT INIT DAY: CPT

## 2019-10-15 PROCEDURE — 83735 ASSAY OF MAGNESIUM: CPT

## 2019-10-15 PROCEDURE — 71045 X-RAY EXAM CHEST 1 VIEW: CPT

## 2019-10-15 PROCEDURE — 82803 BLOOD GASES ANY COMBINATION: CPT

## 2019-10-15 PROCEDURE — 82330 ASSAY OF CALCIUM: CPT

## 2019-10-15 PROCEDURE — 83605 ASSAY OF LACTIC ACID: CPT

## 2019-10-15 PROCEDURE — 84100 ASSAY OF PHOSPHORUS: CPT

## 2019-10-15 PROCEDURE — 82550 ASSAY OF CK (CPK): CPT

## 2019-10-15 PROCEDURE — 85384 FIBRINOGEN ACTIVITY: CPT

## 2019-10-15 PROCEDURE — 82565 ASSAY OF CREATININE: CPT

## 2019-10-15 PROCEDURE — 81025 URINE PREGNANCY TEST: CPT

## 2019-10-15 PROCEDURE — 84484 ASSAY OF TROPONIN QUANT: CPT

## 2019-10-15 PROCEDURE — C1769: CPT

## 2019-10-15 PROCEDURE — 82435 ASSAY OF BLOOD CHLORIDE: CPT

## 2019-10-15 PROCEDURE — 82553 CREATINE MB FRACTION: CPT

## 2019-10-15 PROCEDURE — 99024 POSTOP FOLLOW-UP VISIT: CPT

## 2019-10-15 PROCEDURE — 80048 BASIC METABOLIC PNL TOTAL CA: CPT

## 2019-10-15 PROCEDURE — C1894: CPT

## 2019-10-15 PROCEDURE — 86891 AUTOLOGOUS BLOOD OP SALVAGE: CPT

## 2019-10-15 PROCEDURE — 84443 ASSAY THYROID STIM HORMONE: CPT

## 2019-10-15 PROCEDURE — 82947 ASSAY GLUCOSE BLOOD QUANT: CPT

## 2019-10-15 PROCEDURE — 85027 COMPLETE CBC AUTOMATED: CPT

## 2019-10-15 PROCEDURE — 93005 ELECTROCARDIOGRAM TRACING: CPT

## 2019-10-15 PROCEDURE — 84132 ASSAY OF SERUM POTASSIUM: CPT

## 2019-10-15 PROCEDURE — P9047: CPT

## 2019-10-15 PROCEDURE — 84295 ASSAY OF SERUM SODIUM: CPT

## 2019-10-15 PROCEDURE — C1751: CPT

## 2019-10-15 PROCEDURE — 97161 PT EVAL LOW COMPLEX 20 MIN: CPT

## 2019-10-15 RX ORDER — ATORVASTATIN CALCIUM 80 MG/1
1 TABLET, FILM COATED ORAL
Qty: 30 | Refills: 0
Start: 2019-10-15 | End: 2019-11-13

## 2019-10-15 RX ORDER — POLYETHYLENE GLYCOL 3350 17 G/17G
17 POWDER, FOR SOLUTION ORAL
Qty: 450 | Refills: 0
Start: 2019-10-15

## 2019-10-15 RX ORDER — CLOPIDOGREL BISULFATE 75 MG/1
1 TABLET, FILM COATED ORAL
Qty: 30 | Refills: 0
Start: 2019-10-15 | End: 2019-11-13

## 2019-10-15 RX ORDER — LEVETIRACETAM 250 MG/1
1 TABLET, FILM COATED ORAL
Qty: 60 | Refills: 0
Start: 2019-10-15 | End: 2019-11-13

## 2019-10-15 RX ORDER — METOPROLOL TARTRATE 50 MG
1 TABLET ORAL
Qty: 90 | Refills: 0
Start: 2019-10-15 | End: 2019-11-13

## 2019-10-15 RX ORDER — FAMOTIDINE 10 MG/ML
1 INJECTION INTRAVENOUS
Qty: 14 | Refills: 0
Start: 2019-10-15 | End: 2019-10-28

## 2019-10-15 RX ORDER — CARVEDILOL PHOSPHATE 80 MG/1
1 CAPSULE, EXTENDED RELEASE ORAL
Qty: 0 | Refills: 0 | DISCHARGE

## 2019-10-15 RX ORDER — LISINOPRIL 2.5 MG/1
1 TABLET ORAL
Qty: 0 | Refills: 0 | DISCHARGE

## 2019-10-15 RX ORDER — DOCUSATE SODIUM 100 MG
1 CAPSULE ORAL
Qty: 90 | Refills: 0
Start: 2019-10-15 | End: 2019-11-13

## 2019-10-15 RX ORDER — ASPIRIN/CALCIUM CARB/MAGNESIUM 324 MG
1 TABLET ORAL
Qty: 0 | Refills: 0 | DISCHARGE

## 2019-10-15 RX ORDER — OXYCODONE HYDROCHLORIDE 5 MG/1
1 TABLET ORAL
Qty: 30 | Refills: 0
Start: 2019-10-15 | End: 2019-10-19

## 2019-10-15 RX ORDER — IPRATROPIUM BROMIDE 0.2 MG/ML
2 SOLUTION, NON-ORAL INHALATION
Qty: 0 | Refills: 0 | DISCHARGE

## 2019-10-15 RX ORDER — CLONAZEPAM 1 MG
0.5 TABLET ORAL
Qty: 15 | Refills: 0
Start: 2019-10-15 | End: 2019-11-13

## 2019-10-15 RX ORDER — ASPIRIN/CALCIUM CARB/MAGNESIUM 324 MG
1 TABLET ORAL
Qty: 30 | Refills: 0
Start: 2019-10-15 | End: 2019-11-13

## 2019-10-15 RX ADMIN — OXYCODONE HYDROCHLORIDE 5 MILLIGRAM(S): 5 TABLET ORAL at 12:20

## 2019-10-15 RX ADMIN — FAMOTIDINE 20 MILLIGRAM(S): 10 INJECTION INTRAVENOUS at 11:20

## 2019-10-15 RX ADMIN — POLYETHYLENE GLYCOL 3350 17 GRAM(S): 17 POWDER, FOR SOLUTION ORAL at 11:20

## 2019-10-15 RX ADMIN — CHLORHEXIDINE GLUCONATE 1 APPLICATION(S): 213 SOLUTION TOPICAL at 05:41

## 2019-10-15 RX ADMIN — HEPARIN SODIUM 5000 UNIT(S): 5000 INJECTION INTRAVENOUS; SUBCUTANEOUS at 05:42

## 2019-10-15 RX ADMIN — Medication 100 MILLIGRAM(S): at 05:42

## 2019-10-15 RX ADMIN — Medication 50 MILLIGRAM(S): at 05:42

## 2019-10-15 RX ADMIN — CLOPIDOGREL BISULFATE 75 MILLIGRAM(S): 75 TABLET, FILM COATED ORAL at 11:20

## 2019-10-15 RX ADMIN — Medication 81 MILLIGRAM(S): at 11:20

## 2019-10-15 RX ADMIN — LEVETIRACETAM 1000 MILLIGRAM(S): 250 TABLET, FILM COATED ORAL at 05:42

## 2019-10-15 RX ADMIN — OXYCODONE HYDROCHLORIDE 10 MILLIGRAM(S): 5 TABLET ORAL at 06:12

## 2019-10-15 RX ADMIN — OXYCODONE HYDROCHLORIDE 10 MILLIGRAM(S): 5 TABLET ORAL at 05:42

## 2019-10-15 RX ADMIN — SODIUM CHLORIDE 3 MILLILITER(S): 9 INJECTION INTRAMUSCULAR; INTRAVENOUS; SUBCUTANEOUS at 05:42

## 2019-10-15 RX ADMIN — OXYCODONE HYDROCHLORIDE 5 MILLIGRAM(S): 5 TABLET ORAL at 11:26

## 2019-10-15 NOTE — DISCHARGE NOTE PROVIDER - CARE PROVIDERS DIRECT ADDRESSES
[email or web address]

## 2019-10-15 NOTE — DISCHARGE NOTE NURSING/CASE MANAGEMENT/SOCIAL WORK - NSDCPEEMAIL_GEN_ALL_CORE
[facility] for Tobacco Control email [email or web address]

## 2019-10-15 NOTE — DISCHARGE NOTE PROVIDER - NSDCACTIVITY_GEN_ALL_CORE
Sex allowed/Showering allowed/Walking - Indoors allowed/Do not drive or operate machinery/Walking - Outdoors allowed/Stairs allowed/No heavy lifting/straining

## 2019-10-15 NOTE — DISCHARGE NOTE PROVIDER - NSDCCPCAREPLAN_GEN_ALL_CORE_FT
PRINCIPAL DISCHARGE DIAGNOSIS  Diagnosis: S/P CABG x 4  Assessment and Plan of Treatment: 1. Daily Shower  2. Weight yourself daily and notify any weight gain greater than 2-3 pounds in 24 hours.  3. Regular diet - low fat, low cholesterol, no added salt.  4. Cleanse Midsternal incision and leg incision daily while showering with warm water and mild soap, pat dry and maintain open to air.   5. Follow Cardiac Surgery Do's and Don'ts discharge instructions.   6. No driving until cleared by MD.   7. No heavy lifting nothing greater than 5 pounds until cleared by MD.   8. Call / Notify MD any fever greater than 101.0  9. Increase Activity as tolerated.

## 2019-10-15 NOTE — DISCHARGE NOTE PROVIDER - NSDCPNSUBOBJ_GEN_ALL_CORE
hello    VITAL SIGNS        Telemetry:  nsr 90    Vital Signs Last 24 Hrs    T(C): 36.6 (10-15-19 @ 07:20), Max: 37 (10-14-19 @ 23:25)    T(F): 97.9 (10-15-19 @ 07:20), Max: 98.6 (10-14-19 @ 23:25)    HR: 88 (10-15-19 @ 07:20) (80 - 88)    BP: 109/69 (10-15-19 @ 07:20) (103/56 - 124/63)    RR: 16 (10-15-19 @ 07:20) (16 - 18)    SpO2: 95% (10-15-19 @ 07:20) (94% - 96%)                             10.3<L>                134<L>| 29   | 17             10 >-----------< 276     ------------------------< 109<H>                   33.1<L>                4.3  | 97   | 0.79                                                                          Ca 9.3   Mg x     Ph x            ,             10.1<L>                136  | 29   | 17             11.48<H> >-----------< 250     ------------------------< 99                      32.3<L>                4.6  | 97   | 0.77                                                                          Ca 8.9   Mg x     Ph x                            10-14-19 @ 07:01  -  10-15-19 @ 07:00    --------------------------------------------------------    IN: 700 mL / OUT: 1425 mL / NET: -725 mL        10-12 @ 05:28    PT-- INR--    PTT25.4    10-11 @ 00:22    PT15.2 INR1.31    PTT27.1    10-10 @ 14:41    PT15.6 INR1.34    PTT26.7            Daily       Daily Weight in k.5 (15 Oct 2019 06:00)                CAPILLARY BLOOD GLUCOSE                                    Coumadin    [ ] YES          [ x ]      NO                                               PHYSICAL EXAM                Neurology: alert and oriented x 3, nonfocal, no gross deficits    CV : .S1S2 RRR    Sternal Wound :  CDI , Stable    Lungs: cta    Abdomen: soft, nontender, nondistended, positive bowel sounds    :         voiding      Extremities:     _trace _ edema, __-_calf tenderness.                              bilat btk__svg site cdi                    aspirin enteric coated 81 milliGRAM(s) Oral daily    atorvastatin 80 milliGRAM(s) Oral at bedtime    chlorhexidine 2% Cloths 1 Application(s) Topical <User Schedule>    clonazePAM  Tablet 0.25 milliGRAM(s) Oral at bedtime PRN    clopidogrel Tablet 75 milliGRAM(s) Oral daily    docusate sodium 100 milliGRAM(s) Oral three times a day    famotidine    Tablet 20 milliGRAM(s) Oral daily    heparin  Injectable 5000 Unit(s) SubCutaneous every 8 hours    levETIRAcetam 1000 milliGRAM(s) Oral two times a day    metoprolol tartrate 50 milliGRAM(s) Oral every 8 hours    oxyCODONE    IR 5 milliGRAM(s) Oral every 4 hours PRN    oxyCODONE    IR 10 milliGRAM(s) Oral every 4 hours PRN    polyethylene glycol 3350 17 Gram(s) Oral daily    sodium chloride 0.9% lock flush 3 milliLiter(s) IV Push every 8 hours                                      Physical Therapy Rec:   Home  [  ]   Home w/ PT  [  ]  Rehab  [  ]    Discussed with Cardiothoracic Team at AM rounds.

## 2019-10-15 NOTE — DISCHARGE NOTE NURSING/CASE MANAGEMENT/SOCIAL WORK - NSDCPEWEB_GEN_ALL_CORE
[facility] for Tobacco Control website --- [email or web address] website --- [email or web address]

## 2019-10-15 NOTE — DISCHARGE NOTE PROVIDER - CARE PROVIDER_API CALL
Bonifacio Rivera)  Thoracic and Cardiac Surgery  [address] [place] [address]  Phone: [phone]  Fax: [phone]  Follow Up Time:     Lito Moise)  Internal Medicine; Nuclear Cardiology  [address] [place] [address]  Phone: [phone]  Fax: [phone]  Follow Up Time:

## 2019-10-15 NOTE — DISCHARGE NOTE PROVIDER - NSDCFUSCHEDAPPT_GEN_ALL_CORE_FT
DEB MENDEZ ; 10/16/2019 ; N[facility] Cardio [address] DEB Daniels ; 12/16/2019 ; NPP Neuro [facility]

## 2019-10-15 NOTE — DISCHARGE NOTE NURSING/CASE MANAGEMENT/SOCIAL WORK - PATIENT PORTAL LINK FT
You can access the FollowMyHealth Patient Portal offered by [facility] by registering at the following website: [email or web address]. By joining [employer]’s FollowMyHealth portal, you will also be able to view your health information using other applications (apps) compatible with our system.

## 2019-10-15 NOTE — DISCHARGE NOTE PROVIDER - HOSPITAL COURSE
53yr old female presents for a CABG. TTE demonstrated severely dilated left atrium, severe diastolic dysfunction and cardiac cath showed triple vessel disease. Pt w/ hx of Anxiety, Hypertension, Hyperlipidemia, Smoking (1ppd >30 yrs; quit in 6/2019), and recent Right MCA infarct (6/19) s/p SANDIE stent w/ Dr. Barrios.    10/10: Pt underwent CABGx4 with Dr. Rivera. CTU-extubated, dc'd pressors, no products in OR.     10/11: Pt transferred to SDU in stable condition. Dc'd mitchell and right femoral A line prior to arrival to unit-sandbag on right groin.     Will restart Plavix tomorrow for hx of recent CVA per Dr. Rivera.     Mediastinal and left pleural CT in place with minimal drainage.     PW in place VVI 40, MA 10.     10/12 vss - hr 87 will increase BB - Lpl CT & med CT w/ low output - CTs removed - will ck cxr- ij d/c'd as wbc 20.  afebrile incisions cdi. will monitor    plavix resumed for h/o carotid stent    10/13 VSS pt ambulating -+ edema b/l - lasix 40 po x 1 for +edema on 50 tid of lopressor - can transfer to floor - rounds made w/ dr. husain covering dr. rivera    Discharge planning-pending PT recommendations.     10/14 Pt Progressing well, plavix resumed.    D/c planning tomorrow, pw to be cut    10/15/19 pT FOR D/C HOME TODAY as d/w Dr Rivera

## 2019-10-16 RX ORDER — LISINOPRIL 40 MG/1
40 TABLET ORAL DAILY
Qty: 90 | Refills: 3 | Status: DISCONTINUED | COMMUNITY
End: 2019-10-16

## 2019-10-16 RX ORDER — CLONAZEPAM 1 MG/1
1 TABLET ORAL
Qty: 30 | Refills: 0 | Status: DISCONTINUED | COMMUNITY
Start: 2019-07-11 | End: 2019-10-16

## 2019-10-16 RX ORDER — HYDRALAZINE HYDROCHLORIDE 10 MG/1
10 TABLET ORAL EVERY 8 HOURS
Qty: 270 | Refills: 3 | Status: DISCONTINUED | COMMUNITY
End: 2019-10-16

## 2019-10-16 RX ORDER — CARVEDILOL 25 MG/1
25 TABLET, FILM COATED ORAL TWICE DAILY
Qty: 180 | Refills: 1 | Status: DISCONTINUED | COMMUNITY
Start: 1900-01-01 | End: 2019-10-16

## 2019-10-16 RX ORDER — ALBUTEROL 90 MCG
90 AEROSOL (GRAM) INHALATION
Refills: 0 | Status: DISCONTINUED | COMMUNITY
End: 2019-10-16

## 2019-10-16 RX ORDER — ALPRAZOLAM 0.5 MG/1
0.5 TABLET ORAL TWICE DAILY
Qty: 10 | Refills: 0 | Status: DISCONTINUED | COMMUNITY
End: 2019-10-16

## 2019-10-16 RX ORDER — NICOTINE 21-14-7MG
21-14-7 KIT TRANSDERMAL
Refills: 0 | Status: DISCONTINUED | COMMUNITY
End: 2019-10-16

## 2019-10-16 RX ORDER — ASPIRIN 325 MG/1
325 TABLET, COATED ORAL DAILY
Refills: 0 | Status: DISCONTINUED | COMMUNITY
End: 2019-10-16

## 2019-10-16 RX ORDER — CLONIDINE HYDROCHLORIDE 0.1 MG/1
0.1 TABLET ORAL
Qty: 60 | Refills: 0 | Status: DISCONTINUED | COMMUNITY
Start: 2019-07-10 | End: 2019-10-16

## 2019-10-16 RX ORDER — CLONAZEPAM 0.5 MG/1
0.5 TABLET ORAL
Refills: 0 | Status: ACTIVE | COMMUNITY
Start: 2019-10-16

## 2019-10-16 RX ORDER — IPRATROPIUM BROMIDE 0.5 MG/2.5ML
0.02 SOLUTION RESPIRATORY (INHALATION)
Refills: 0 | Status: DISCONTINUED | COMMUNITY
End: 2019-10-16

## 2019-10-17 ENCOUNTER — APPOINTMENT (OUTPATIENT)
Dept: CARE COORDINATION | Facility: HOME HEALTH | Age: 54
End: 2019-10-17
Payer: COMMERCIAL

## 2019-10-17 VITALS
DIASTOLIC BLOOD PRESSURE: 84 MMHG | OXYGEN SATURATION: 98 % | RESPIRATION RATE: 16 BRPM | WEIGHT: 216 LBS | BODY MASS INDEX: 32.84 KG/M2 | SYSTOLIC BLOOD PRESSURE: 134 MMHG | HEART RATE: 78 BPM

## 2019-10-17 PROCEDURE — 99024 POSTOP FOLLOW-UP VISIT: CPT

## 2019-10-17 NOTE — HISTORY OF PRESENT ILLNESS
[FreeTextEntry1] : 53F s/p CABG Dr. Rivera, lives with , mother visiting from [place] to help.  Pt s/p stroke last month due to carotid blockage. still with LUE weakness, getting OT. On keppra for seizure she had post stroke, no further seizure activity

## 2019-10-17 NOTE — PHYSICAL EXAM
[Respiration, Rhythm And Depth] : normal respiratory rhythm and effort [Auscultation Breath Sounds / Voice Sounds] : lungs were clear to auscultation bilaterally [Heart Rate And Rhythm] : heart rate was normal and rhythm regular [Heart Sounds] : normal S1 and S2 [Bowel Sounds] : normal bowel sounds [FreeTextEntry1] : MSI, CT sites and SVG sites without erythema, drainage or warmth, with edges well approximated.  Sternum stable. BLE 1+ edema [Abdomen Soft] : soft

## 2019-10-23 ENCOUNTER — APPOINTMENT (OUTPATIENT)
Dept: CARDIOTHORACIC SURGERY | Facility: CLINIC | Age: 54
End: 2019-10-23

## 2019-10-23 VITALS — BODY MASS INDEX: 32.69 KG/M2 | RESPIRATION RATE: 13 BRPM | HEIGHT: 68 IN | TEMPERATURE: 97.9 F | OXYGEN SATURATION: 95 %

## 2019-10-23 VITALS — SYSTOLIC BLOOD PRESSURE: 142 MMHG | DIASTOLIC BLOOD PRESSURE: 82 MMHG

## 2019-10-23 VITALS — BODY MASS INDEX: 32.58 KG/M2 | WEIGHT: 215 LBS | HEIGHT: 68 IN

## 2019-10-28 ENCOUNTER — NON-APPOINTMENT (OUTPATIENT)
Age: 54
End: 2019-10-28

## 2019-10-28 ENCOUNTER — APPOINTMENT (OUTPATIENT)
Dept: CARDIOLOGY | Facility: CLINIC | Age: 54
End: 2019-10-28
Payer: COMMERCIAL

## 2019-10-28 VITALS — SYSTOLIC BLOOD PRESSURE: 136 MMHG | DIASTOLIC BLOOD PRESSURE: 86 MMHG

## 2019-10-28 VITALS
SYSTOLIC BLOOD PRESSURE: 149 MMHG | HEART RATE: 79 BPM | BODY MASS INDEX: 33.34 KG/M2 | OXYGEN SATURATION: 100 % | DIASTOLIC BLOOD PRESSURE: 87 MMHG | WEIGHT: 220 LBS | HEIGHT: 68 IN

## 2019-10-28 PROCEDURE — 93000 ELECTROCARDIOGRAM COMPLETE: CPT

## 2019-10-28 PROCEDURE — 99215 OFFICE O/P EST HI 40 MIN: CPT

## 2019-10-28 NOTE — DISCUSSION/SUMMARY
[FreeTextEntry1] : Overall doing well from a cardiac standpoint.\par Continue present medications.\par Follow up in 5 weeks with blood work.

## 2019-10-28 NOTE — HISTORY OF PRESENT ILLNESS
[FreeTextEntry1] : CABG X4. EF recovered. B/l SVG harvest\par Pain is tolerable and getting better. SVG sites as well. \par No anginal equivalents\par No sob. \par NO palpitations. \par No lightheadedness. \par HTN BP elevated in the office but home readings are in range. Continue to monitor. \par HLD on statin therapy.

## 2019-10-28 NOTE — PHYSICAL EXAM
[General Appearance - Well Developed] : well developed [Normal Appearance] : normal appearance [Well Groomed] : well groomed [General Appearance - Well Nourished] : well nourished [No Deformities] : no deformities [General Appearance - In No Acute Distress] : no acute distress [Normal Conjunctiva] : the conjunctiva exhibited no abnormalities [Eyelids - No Xanthelasma] : the eyelids demonstrated no xanthelasmas [Normal Oral Mucosa] : normal oral mucosa [No Oral Pallor] : no oral pallor [No Oral Cyanosis] : no oral cyanosis [Normal Jugular Venous A Waves Present] : normal jugular venous A waves present [Normal Jugular Venous V Waves Present] : normal jugular venous V waves present [No Jugular Venous Cabezas A Waves] : no jugular venous cabezas A waves [Respiration, Rhythm And Depth] : normal respiratory rhythm and effort [Exaggerated Use Of Accessory Muscles For Inspiration] : no accessory muscle use [Auscultation Breath Sounds / Voice Sounds] : lungs were clear to auscultation bilaterally [Heart Rate And Rhythm] : heart rate and rhythm were normal [Heart Sounds] : normal S1 and S2 [Murmurs] : no murmurs present [Abdomen Soft] : soft [Abdomen Tenderness] : non-tender [Abdomen Mass (___ Cm)] : no abdominal mass palpated [Abnormal Walk] : normal gait [Gait - Sufficient For Exercise Testing] : the gait was sufficient for exercise testing [Nail Clubbing] : no clubbing of the fingernails [Cyanosis, Localized] : no localized cyanosis [Petechial Hemorrhages (___cm)] : no petechial hemorrhages [Skin Color & Pigmentation] : normal skin color and pigmentation [] : no rash [No Venous Stasis] : no venous stasis [Skin Lesions] : no skin lesions [No Skin Ulcers] : no skin ulcer [No Xanthoma] : no  xanthoma was observed [Oriented To Time, Place, And Person] : oriented to person, place, and time [Affect] : the affect was normal [Mood] : the mood was normal [No Anxiety] : not feeling anxious [FreeTextEntry1] : Well healing surgical wounds.

## 2019-11-06 ENCOUNTER — CLINICAL ADVICE (OUTPATIENT)
Age: 54
End: 2019-11-06

## 2019-11-07 ENCOUNTER — OTHER (OUTPATIENT)
Age: 54
End: 2019-11-07

## 2019-11-27 LAB
ALBUMIN SERPL ELPH-MCNC: 4.2 G/DL
ALP BLD-CCNC: 91 U/L
ALT SERPL-CCNC: 20 U/L
ANION GAP SERPL CALC-SCNC: 13 MMOL/L
AST SERPL-CCNC: 17 U/L
BASOPHILS # BLD AUTO: 0.06 K/UL
BASOPHILS NFR BLD AUTO: 0.9 %
BILIRUB SERPL-MCNC: 0.5 MG/DL
BUN SERPL-MCNC: 20 MG/DL
CALCIUM SERPL-MCNC: 9.6 MG/DL
CHLORIDE SERPL-SCNC: 103 MMOL/L
CHOLEST SERPL-MCNC: 98 MG/DL
CHOLEST/HDLC SERPL: 2.6 RATIO
CO2 SERPL-SCNC: 25 MMOL/L
CREAT SERPL-MCNC: 0.91 MG/DL
EOSINOPHIL # BLD AUTO: 0.3 K/UL
EOSINOPHIL NFR BLD AUTO: 4.7 %
ESTIMATED AVERAGE GLUCOSE: 103 MG/DL
GLUCOSE SERPL-MCNC: 87 MG/DL
HBA1C MFR BLD HPLC: 5.2 %
HCT VFR BLD CALC: 42.1 %
HDLC SERPL-MCNC: 38 MG/DL
HGB BLD-MCNC: 12.5 G/DL
IMM GRANULOCYTES NFR BLD AUTO: 0.3 %
LDLC SERPL CALC-MCNC: 46 MG/DL
LYMPHOCYTES # BLD AUTO: 1.32 K/UL
LYMPHOCYTES NFR BLD AUTO: 20.8 %
MAN DIFF?: NORMAL
MCHC RBC-ENTMCNC: 24.4 PG
MCHC RBC-ENTMCNC: 29.7 GM/DL
MCV RBC AUTO: 82.2 FL
MONOCYTES # BLD AUTO: 0.44 K/UL
MONOCYTES NFR BLD AUTO: 6.9 %
NEUTROPHILS # BLD AUTO: 4.21 K/UL
NEUTROPHILS NFR BLD AUTO: 66.4 %
PLATELET # BLD AUTO: 424 K/UL
POTASSIUM SERPL-SCNC: 4.3 MMOL/L
PROT SERPL-MCNC: 7.1 G/DL
RBC # BLD: 5.12 M/UL
RBC # FLD: 13.6 %
SODIUM SERPL-SCNC: 141 MMOL/L
TRIGL SERPL-MCNC: 72 MG/DL
WBC # FLD AUTO: 6.35 K/UL

## 2019-12-02 ENCOUNTER — APPOINTMENT (OUTPATIENT)
Dept: CARDIOLOGY | Facility: CLINIC | Age: 54
End: 2019-12-02
Payer: COMMERCIAL

## 2019-12-02 ENCOUNTER — NON-APPOINTMENT (OUTPATIENT)
Age: 54
End: 2019-12-02

## 2019-12-02 VITALS — SYSTOLIC BLOOD PRESSURE: 194 MMHG | DIASTOLIC BLOOD PRESSURE: 95 MMHG | OXYGEN SATURATION: 99 % | HEART RATE: 70 BPM

## 2019-12-02 PROCEDURE — 93000 ELECTROCARDIOGRAM COMPLETE: CPT

## 2019-12-02 PROCEDURE — 99214 OFFICE O/P EST MOD 30 MIN: CPT

## 2019-12-02 NOTE — ED ADULT NURSE NOTE - NS ED NURSE RECORD ANOTHER VITAL SIGN
Detail Level: Generalized Yes Quality 130: Documentation Of Current Medications In The Medical Record: Current Medications Documented Quality 431: Preventive Care And Screening: Unhealthy Alcohol Use - Screening: Patient screened for unhealthy alcohol use using a single question and scores less than 2 times per year Quality 402: Tobacco Use And Help With Quitting Among Adolescents: Patient screened for tobacco and never smoked

## 2019-12-04 ENCOUNTER — MEDICATION RENEWAL (OUTPATIENT)
Age: 54
End: 2019-12-04

## 2019-12-16 ENCOUNTER — APPOINTMENT (OUTPATIENT)
Dept: NEUROLOGY | Facility: CLINIC | Age: 54
End: 2019-12-16
Payer: COMMERCIAL

## 2019-12-16 VITALS
BODY MASS INDEX: 33.34 KG/M2 | HEART RATE: 84 BPM | SYSTOLIC BLOOD PRESSURE: 134 MMHG | WEIGHT: 220 LBS | HEIGHT: 68 IN | DIASTOLIC BLOOD PRESSURE: 78 MMHG

## 2019-12-16 PROCEDURE — 99214 OFFICE O/P EST MOD 30 MIN: CPT

## 2019-12-16 NOTE — ASSESSMENT
[FreeTextEntry1] : Ms. MENDEZ presents with onset of focal seizures in setting of acutre stroke, initially recurring at initial doses of Keppra, with no further recurrence on levetiracetam 1000 q12. Exam is notable for mild L HP, affecting arm most, face minimally, and sparing leg. Now seizure-free x 6 mo on levetiracetam 1000 q12.\par \par Plan\par 1. change levetiracetam  - to mitigate side effects and achieve smoother levels. \par 2. check amb EEG x 48h \par 3. RTC 6 wks - will likely reduce to levetiracetam  q12 at that time if doing well. \par \par I have spent 25 minutes of face to face time with the patient reviewing the cause of seizures or seizure-like events, assessing the risk of recurrence, educating the patient or family to recognize seizures, and discussing possible treatment options and possible side effects of seizure medications. I also discussed seizure safety, and ways of reducing seizure risk. Greater than 50% of the encounter time was spent on counseling and coordination of care for reviewing records in [employer], discussion with patient regarding plan.

## 2019-12-16 NOTE — HISTORY OF PRESENT ILLNESS
[FreeTextEntry1] : *** 12/16/2019  ***\par Ms. MCGOWAN reports no interval seizure.  She had CABG x 4 vessels done this fall and is now fairly recovered by still has pain in L arm post-surgery. She needs carotid stent on left, which will be done in Jan 2020.  Ms. MCGOWAN reports increased irritability, possibly due to levetiracetam.  Otherwise no problems.  \par \par *** 09/16/2019  ***\par Ms. MCGOWAN presents for evaluation of seizures that occurred at onset of R MCA stroke from R ICA stenosis in June 2019.  MRI DWI shows signal abnormality in watershed distribution.  Ms. MCGOWAN recalls that initial symptoms was jerking on L arm that lasted about 2 minutes during which she was also not able to speak.  When this occurred she went to [facility]. At [facility] she experienced a second similar event and was diagnosed with stroke on CT and resultant seizure and she was started on levetiracetam. She was transferred to [facility] where she underwent carotid stenting. During her hospitalization at [facility], Ms. MCGOWAN had recurrent episodes of twitching in L arm and Keppra dose was increased to 1000mg q12.  Ms. MCGOWAN has continued on levetiracetam and has not had recurrence of focal motor seizures or any other event suspicious for seizures.  Her use of the R arm has improved over time, but her left hand is still impaired. \par \par *** from Stroke NP 08/14/2019 ***\par Ms. Mcgowan is a 53 year old female PMHx Anxiety, 40 pk/yr smoking hx (active time of stroke), does not get regular checkups who presents today for post hospitalization follow up after a right MCA artery ischemic infarct on 6/20/19. She is s/p right internal carotid stent stent place on 6/21/19 with Dr. Barrios.\par \par HPI:\par 52y/o F RH  PMH Anxiety, 40 pk/yr smoking hx(still active), does not get regular checkups presented to [facility] with left arm numbness, spasms starting 20:00 on 6/19. A phone consult was performed with [facility] and during that time she had some worsening of her neurological status but left arm weakness and spasms. At that time she was given Ativan for possible seizure. CT noncontrast and CT angiogram of head and neck revealed right frontal hypodensity suggestive of right MCA infarction in the setting of bilateral severe carotid stenosis. She was then transferred to [facility], on exam NIHSS was 6, alert, awake however unable to move her left arm. She was also noticed to have continuous twitching of left arm and face, drop in oxygen saturation for which she was put on BiPAP.\par She is s/p right internal carotid stent stent place on 6/21/19 with Dr. Barrios. Carotid dopplers performed: unimpeded antegrade flow through the stented right ICA, severe, >70% stenosis of the left ICA, flow limiting stenosis of b/l ECA ,\par On 6/20 AM she was noted to have jerking movements of the left hand to LUE with subsequent fascial twitching and resolved with 2mg of IV Ativan, 1g of Keppra. VEEG with no epileptiform abnormalities or seizures recorded. Continue Keppra 1g BID for seizure ppx, maintain seizure precautions.

## 2019-12-29 NOTE — HISTORY OF PRESENT ILLNESS
[FreeTextEntry1] : Feels well. \par CVA. Carotid R stent. She has residual 70% LICA. \par Severe LV dysfunction. EF recovered with medical therapy now s/p CABG. \par No cardiac complaints. No neurologic symptoms. \par BP checked at other doctors is usually ok but elevated today.

## 2019-12-29 NOTE — DISCUSSION/SUMMARY
[FreeTextEntry1] : BP elevated in the office today. Will increase lisinopril to 20 mg for today. She will monitor BP.\par Continue other medications.

## 2019-12-29 NOTE — PHYSICAL EXAM
[General Appearance - Well Developed] : well developed [Normal Appearance] : normal appearance [Well Groomed] : well groomed [General Appearance - Well Nourished] : well nourished [No Deformities] : no deformities [General Appearance - In No Acute Distress] : no acute distress [Normal Conjunctiva] : the conjunctiva exhibited no abnormalities [Eyelids - No Xanthelasma] : the eyelids demonstrated no xanthelasmas [Normal Oral Mucosa] : normal oral mucosa [No Oral Pallor] : no oral pallor [No Oral Cyanosis] : no oral cyanosis [Normal Jugular Venous A Waves Present] : normal jugular venous A waves present [Normal Jugular Venous V Waves Present] : normal jugular venous V waves present [No Jugular Venous Cabezas A Waves] : no jugular venous cabezas A waves [Respiration, Rhythm And Depth] : normal respiratory rhythm and effort [Exaggerated Use Of Accessory Muscles For Inspiration] : no accessory muscle use [Auscultation Breath Sounds / Voice Sounds] : lungs were clear to auscultation bilaterally [Heart Rate And Rhythm] : heart rate and rhythm were normal [Heart Sounds] : normal S1 and S2 [Murmurs] : no murmurs present [Abdomen Soft] : soft [Abdomen Tenderness] : non-tender [Abdomen Mass (___ Cm)] : no abdominal mass palpated [Abnormal Walk] : normal gait [Gait - Sufficient For Exercise Testing] : the gait was sufficient for exercise testing [Cyanosis, Localized] : no localized cyanosis [Nail Clubbing] : no clubbing of the fingernails [Petechial Hemorrhages (___cm)] : no petechial hemorrhages [Skin Color & Pigmentation] : normal skin color and pigmentation [] : no rash [No Venous Stasis] : no venous stasis [No Skin Ulcers] : no skin ulcer [Skin Lesions] : no skin lesions [No Xanthoma] : no  xanthoma was observed [Oriented To Time, Place, And Person] : oriented to person, place, and time [Affect] : the affect was normal [Mood] : the mood was normal [No Anxiety] : not feeling anxious [FreeTextEntry1] : Well healing surgical wounds.

## 2019-12-31 ENCOUNTER — MEDICATION RENEWAL (OUTPATIENT)
Age: 54
End: 2019-12-31

## 2020-01-16 ENCOUNTER — APPOINTMENT (OUTPATIENT)
Dept: NEUROLOGY | Facility: CLINIC | Age: 55
End: 2020-01-16
Payer: COMMERCIAL

## 2020-01-16 VITALS
HEIGHT: 68 IN | WEIGHT: 226 LBS | SYSTOLIC BLOOD PRESSURE: 148 MMHG | BODY MASS INDEX: 34.25 KG/M2 | DIASTOLIC BLOOD PRESSURE: 78 MMHG | HEART RATE: 65 BPM

## 2020-01-16 PROCEDURE — 99215 OFFICE O/P EST HI 40 MIN: CPT

## 2020-01-16 PROCEDURE — 93892 TCD EMBOLI DETECT W/O INJ: CPT

## 2020-01-16 PROCEDURE — 93886 INTRACRANIAL COMPLETE STUDY: CPT

## 2020-01-16 PROCEDURE — 93880 EXTRACRANIAL BILAT STUDY: CPT

## 2020-01-16 RX ORDER — CHOLECALCIFEROL (VITAMIN D3) 1250 MCG
1.25 MG CAPSULE ORAL
Qty: 4 | Refills: 0 | Status: ACTIVE | COMMUNITY
Start: 2019-12-03

## 2020-01-16 RX ORDER — LEVETIRACETAM 1000 MG/1
1000 TABLET, FILM COATED ORAL TWICE DAILY
Qty: 60 | Refills: 0 | Status: DISCONTINUED | COMMUNITY
End: 2020-01-16

## 2020-01-16 NOTE — REASON FOR VISIT
[Follow-Up: _____] : a [unfilled] follow-up visit [Post Hospitalization] : a post hospitalization visit [Spouse] : spouse [FreeTextEntry1] : stroke s/p ICA stenting

## 2020-01-16 NOTE — HISTORY OF PRESENT ILLNESS
[FreeTextEntry1] : Ms. Mcgowan is a 54 year old female PMHx Anxiety, 40 pk/yr smoking hx (active time of stroke), does not get regular checkups who presents today for post hospitalization follow up after a right MCA artery ischemic infarct on 6/20/19. She is s/p right internal carotid stent stent place on 6/21/19 with Dr. Barrios.\par \par \par She is being tapered off Keppra, no seizures since her stroke in June. No new neurological symptoms; she has made significant motor recovery ; has numbness of arm.  She remains with left upper extremity weakness. She reports that she has not smoked since stroke. She is currently enrolled in OT.\par \par HPI:\par 52y/o F RH  PMH Anxiety, 40 pk/yr smoking hx(still active), does not get regular checkups presented to [facility] with left arm numbness, spasms starting 20:00 on 6/19. A phone consult was performed with [facility] and during that time she had some worsening of her neurological status but left arm weakness and spasms. At that time she was given Ativan for possible seizure. CT noncontrast and CT angiogram of head and neck revealed right frontal hypodensity suggestive of right MCA infarction in the setting of bilateral severe carotid stenosis. She was then transferred to [facility], on exam NIHSS was 6, alert, awake however unable to move her left arm. She was also noticed to have continuous twitching of left arm and face, drop in oxygen saturation for which she was put on BiPAP.\par She is s/p right internal carotid stent stent place on 6/21/19 with Dr. Barrios. Carotid dopplers performed: unimpeded antegrade flow through the stented right ICA, severe, >70% stenosis of the left ICA, flow limiting stenosis of b/l ECA ,\par On 6/20 AM she was noted to have jerking movements of the left hand to LUE with subsequent fascial twitching and resolved with 2mg of IV Ativan, 1g of Keppra. VEEG with no epileptiform abnormalities or seizures recorded. Continue Keppra 1g BID for seizure ppx, maintain seizure precautions.\par \par Workup:\par CT/CTA head and neck 6.20.19\par CT HEAD: Acute right middle cerebral artery infarct involving the right \par precentral gyrus and centrum semiovale. ASPECTS score 8 out of 10. No \par hemorrhage or midline shift. \par \par CTA HEAD AND NECK: \par -Severe stenosis at the origin of the right internal carotid artery with \par filling defect in the anterior cavernous segment suspicious for critical \par stenosis/focal occlusion.\par -Moderate to severe stenosis of the proximal cervical segment and mild \par stenosis of the posterior cavernous segment of the left internal carotid \par artery.\par \par CTP 6.20.19\par Presence of changes in the posterior circulation which are somewhat \par difficult to interpret. There is however some evidence of a delay in the \par MTT though less than 6 seconds in the right MCA territory at the level of \par lucency identified on the patient's CT evaluation. The age of this is \par unclear.\par \par CT head 6.20.19\par No interval change in right middle cerebral artery infarct involving the \par right precentral gyrus and centrum semiovale. No hemorrhage. ASPECTS \par score 8 out of 10.\par \par MRI Head 6.20.19\par Acute right frontoparietal and temporal lobe infarcts. Possible subacute \par subcortical white matter infarct right frontal lobe. No acute \par intracranial hemorrhage.\par \par IR angiogram 6.21.19\par Successful angioplasty and stenting of symptomatic right \par carotid stenosis\par \par LE Doppler 6.21.19\par 1.  No evidence of lower extremity deep venous thrombosis within the \par imaged segments of the right lower extremity system.\par 2.  No left lower extremity venous thrombosis.\par \par Carotid Doppler 6.21.19\par There is unimpeded antegrade flow through the stented right \par internal carotid artery. There is a severe, greater than 70% stenosis of the left internal carotid \par artery. There are flow-limiting stenoses of both the right and left external \par carotid arteries.\par \par TTE 6.21.19\par 1. Tethered mitral valve leaflets. Mild mitral\par regurgitation.  Peak mitral valve gradient equals 10 mm Hg,\par mean transmitral valve gradient equals 5 mm Hg, consistent\par with mild to moderate mitral stenosis. ()\par 2. Aortic valve not well visualized; probably normal.\par 3. Severely dilated left atrium.  LA volume index = 50\par cc/m2.\par 4. Increased relative wall thickness with normal left\par ventricular mass index, consistent with concentric left\par ventricular remodeling.\par 5. Moderate to severe global left ventricular systolic\par dysfunction.\par 6. Severe diastolic dysfunction (Stage III).\par 7. Right ventricular enlargement with decreased right\par ventricular systolic function.\par 8. Estimated pulmonary artery systolic pressure equals 69\par mm Hg, assuming right atrial pressure equals 8 mm Hg,\par consistent with severe pulmonary pressures.\par 9. Agitated saline injection demonstrates no evidence of a\par patent foramen ovale.\par

## 2020-01-16 NOTE — PHYSICAL EXAM
[General Appearance - Alert] : alert [General Appearance - In No Acute Distress] : in no acute distress [Oriented To Time, Place, And Person] : oriented to person, place, and time [Impaired Insight] : insight and judgment were intact [Affect] : the affect was normal [Person] : oriented to person [Place] : oriented to place [Time] : oriented to time [Short Term Intact] : short term memory intact [Concentration Intact] : normal concentrating ability [Visual Intact] : visual attention was ~T not ~L decreased [Naming Objects] : no difficulty naming common objects [Repeating Phrases] : no difficulty repeating a phrase [Fluency] : fluency intact [Reading] : reading intact [Cranial Nerves Optic (II)] : visual acuity intact bilaterally,  visual fields full to confrontation, pupils equal round and reactive to light [Cranial Nerves Oculomotor (III)] : extraocular motion intact [Cranial Nerves Trigeminal (V)] : facial sensation intact symmetrically [Cranial Nerves Facial (VII)] : face symmetrical [Cranial Nerves Vestibulocochlear (VIII)] : hearing was intact bilaterally [Cranial Nerves Glossopharyngeal (IX)] : tongue and palate midline [Cranial Nerves Hypoglossal (XII)] : there was no tongue deviation with protrusion [CNS Accessory - Diminished Shoulder Elev. - Left Trapezius] : weakness of shoulder elevation was present on the left [Motor Tone] : muscle tone was normal in all four extremities [Motor Strength] : muscle strength was normal in all four extremities [No Muscle Atrophy] : normal bulk in all four extremities [Paresis Pronator Drift Left-Sided] : a left-sided pronator drift was present [Motor Strength Upper Extremities Left] : there was weakness of the left upper extremity [3] : forearm supination 3/5 [5] : ankle plantar flexion 5/5 [Tactile Decrease Shoulders Left] : diminished left shoulder [Tactile Decrease Entire Left Arm] : diminished left arm [Tactile Decrease Hand] : diminished left hand [Tactile Decrease Neck Left Side] : diminished left side of the neck [Abnormal Walk] : normal gait [Balance] : balance was intact [Dysdiadochokinesia On The Left] : present on the left side [Coordination - Dysmetria Impaired Finger-to-Nose Left Only] : present on the left side [Sclera] : the sclera and conjunctiva were normal [PERRL With Normal Accommodation] : pupils were equal in size, round, reactive to light, with normal accommodation [Outer Ear] : the ears and nose were normal in appearance [Oropharynx] : the oropharynx was normal [Paresis Pronator Drift Right-Sided] : no pronator drift on the right [Motor Strength Upper Extremities Right] : strength was normal in the right upper extremity [Motor Strength Lower Extremities Left] : strength was normal in the left lower extremity [Motor Strength Lower Extremities Right] : strength was normal in the right lower extremity [Romberg's Sign] : Romberg's sign was negtive [Past-pointing] : there was no past-pointing [Tremor] : no tremor present [Coordination - Dysmetria Impaired Finger-to-Nose Right Only] : not present on the ride side [Dysdiadochokinesia On The Right] : not present on the ride side [Coordination Dysmetria Impaired Heel-Shin Using Right Heel] : not present on the ride side [Coordination Dysmetria Impaired Heel-to-Shin Using Left Heel] : not present on the left side [Plantar Reflex Right Only] : normal on the right [Plantar Reflex Left Only] : normal on the left

## 2020-01-16 NOTE — DISCUSSION/SUMMARY
[Antithrombotic therapy with ___] : antithrombotic therapy with  [unfilled] [Intensive Blood Pressure Control] : intensive blood pressure control [Lipid Lowering Therapy] : lipid lowering therapy [Patient encouraged to discuss with Primary MD] : I encouraged the patient to discuss these important issues with ~his/her~ primary care doctor [Goals and Counseling] : I have reviewed the goals of stroke risk factor modification. I counseled the patient on measures to reduce stroke risk, including the importance of medication compliance, risk factor control, exercise, healthy diet and avoidance of smoking. I reviewed stroke warning signs and symptoms and appropriate actions to take if such occur. [FreeTextEntry1] : Impression:\par #1 Right MCA artery ischemic infarct in the setting of large artery atherosclerosis - infarct pattern suggestive of watershed MCA -PCA and CORBIN-MCA and MCA embolic appearing infarct in the setting of Right carotid severe stenosis\par \par #2 Bilateral carotid extracranial stenosis - Left Carotid stenosis - Asymptomatic - No clinical symptoms suggestive of TIA of left hemispheric territory and no silent brain infarcts on MRI.\par On doppler today she has > 70 5 stenosis with elevated velocities, no microemboli detected. I will discuss this with , however, based on asymptomatic stenosis, low risk of stroke < 2% a year, her being complaint with medical therapy,  i recommend best medical therapy with close monitoring along with high dose statins.\par \par - Continue ASA 81 mg and Plavix 75 mg as directed by Dr. Barrios in setting of ICA stent. Duration to be determined by Dr. Barrios. Follow up with Dr. Barrios as directed (referral provided) . P2Y12 06/22 was 84\par - Pt evaluated by cardiology while inpatient due to an EF 30-35%, severely dilated left atrium, severe diastolic dysfunction, no PFO s/p cardiac cath on 06/25/19: 3 VCAD as per CT surgery s/p CABG.\par Decision for dual antiplatelet deferred to NeuroSx and Cardiology. From secondary stroke prevention standpoint, ASA 81 mg \par \par - Seizure precautions including safety precautions like not going to high places, not swimming alone without anyone's direct supervision, avoiding to take a bath without direct supervision and preferring to take showers, not operating heavy machinery and not driving for at least a year from most recent seizure were discussed with the patient and available family members in detail \par - Continue to monitor BP at home and notify PCP/Cardiology for readings >140/90. Advised to maintain a log of BP readings. Educated on medication compliance and BP monitoring to prevent secondary stroke and systemic problems \par - Continue Physical therapy, Occupational therapy as directed. \par - Continue statin therapy for secondary stroke prevention. LDL 71 Further dose titration and management deferred to PCP/cardiology. Goal LDL <70  \par - Follow up with PCP for statin management and primary care needs such as regular blood work including monitoring of HbA1c (6.2) and cholesterol profile (goal LDL <70), to modify vascular risk factors as well as appropriate routine malignancy screenings \par - Home sleep study referral given to patient to assess for sleep apnea as a vascular risk factor\par - Discussed traveling and the importance of hydration, frequent ambulation and medication compliance with positive understanding \par - Discussed smoking cessation and patient reports that she has not smoked since hospital admission and denies any urges or cravings. Advised patient that if she needs smoking cessation assistance to call me and I will put in referral\par \par - Carotid Doppler's and TCD  reviewed.

## 2020-01-22 ENCOUNTER — APPOINTMENT (OUTPATIENT)
Dept: NEUROLOGY | Facility: CLINIC | Age: 55
End: 2020-01-22
Payer: COMMERCIAL

## 2020-01-22 PROCEDURE — 95816 EEG AWAKE AND DROWSY: CPT

## 2020-01-23 PROCEDURE — 95708 EEG WO VID EA 12-26HR UNMNTR: CPT

## 2020-01-24 PROCEDURE — 95700 EEG CONT REC W/VID EEG TECH: CPT

## 2020-01-24 PROCEDURE — 95721 EEG PHY/QHP>36<60 HR W/O VID: CPT

## 2020-01-24 PROCEDURE — 95708 EEG WO VID EA 12-26HR UNMNTR: CPT

## 2020-01-27 ENCOUNTER — APPOINTMENT (OUTPATIENT)
Dept: NEUROSURGERY | Facility: CLINIC | Age: 55
End: 2020-01-27
Payer: COMMERCIAL

## 2020-01-27 VITALS
HEIGHT: 68 IN | WEIGHT: 228 LBS | BODY MASS INDEX: 34.56 KG/M2 | RESPIRATION RATE: 15 BRPM | SYSTOLIC BLOOD PRESSURE: 167 MMHG | HEART RATE: 60 BPM | DIASTOLIC BLOOD PRESSURE: 90 MMHG | OXYGEN SATURATION: 98 %

## 2020-01-27 PROCEDURE — 99213 OFFICE O/P EST LOW 20 MIN: CPT

## 2020-01-27 NOTE — REASON FOR VISIT
[Follow-Up: _____] : a [unfilled] follow-up visit [Spouse] : spouse [FreeTextEntry1] : Mariana Mcgowan is a 53yr old female here for a hospital follow up visit. To review she presented to [facility] with left arm weakness and spasm. CT CTA showed RMCA infarction in the setting of bilateral severe carotid stenosis. She was transferred to [facility]. She underwent angioplasty and stenting of symptomatic right carotid stenosis 6/21/2019. Tolerated procedure well was placed on dual antiplatelet therapy with aspirn and plavix. PRU therapeutic at 84. Post procedure carotid doppler ultrasound demonstrated patency of the stent. She had left facial twitching and started on keppra 1gram twice a day. ECHO demonstrated EF of 30-35%. She had cardiac cath done which showed triple vessel disease. Differed treatment until outpatient. Discharged to acute rehab 6/27/2019. Since she has been home she has been feeling well. She is still working with occupational therapy. She feels as if she is almost back to her baseline status. She denies any new motor, sensory, speech, visual abnormalities. She denies any recent seizure activity as well. She is s/p CABG 10/10/2019. SHe tolerated the procedure well and is recovering at home. She recently had carotid doppler ultrasound done in January 2020 and is here to review those results. \par \par PCP: \par Neurologist: Dr. Libmann, Alicia Dee NP\par Cardiologist: Dr. Moise\par Cardiothoracic: Dr. Rivera \par Epilepsy: Dr. Raines \par

## 2020-01-27 NOTE — ASSESSMENT
[FreeTextEntry1] : Impression: 53yr old female s/p right carotid angioplasty and stenting for symptomatic stenosis\par \par Plan: \par Patient recovering from CABG surgery done October 2019 \par Carotid doppler from 1/16/2020 showed SANDIE  LICA  \par Continue aspirin 325mg daily \par Continue Plavix 75mg daily (PRU 84)\par Discussed with patient and  the left side carotid stenosis is symptomatic at this time but given her history of stroke as well as propensity towards vasculopathies recommend treating the left side carotid stenosis either with stenting or carotid endarterectomy. \par Follow up in our office in 2-3months with new carotid doppler ultrasound and we will discuss treatmen again at that time. \par

## 2020-01-27 NOTE — PHYSICAL EXAM
[General Appearance - Alert] : alert [General Appearance - In No Acute Distress] : in no acute distress [Person] : oriented to person [Place] : oriented to place [Time] : oriented to time [Motor Strength] : muscle strength was normal in all four extremities [Involuntary Movements] : no involuntary movements were seen [] : no respiratory distress [Abnormal Walk] : normal gait

## 2020-02-03 ENCOUNTER — RX RENEWAL (OUTPATIENT)
Age: 55
End: 2020-02-03

## 2020-02-14 ENCOUNTER — APPOINTMENT (OUTPATIENT)
Dept: NEUROLOGY | Facility: CLINIC | Age: 55
End: 2020-02-14
Payer: COMMERCIAL

## 2020-02-14 VITALS
WEIGHT: 232 LBS | HEIGHT: 68 IN | HEART RATE: 72 BPM | BODY MASS INDEX: 35.16 KG/M2 | SYSTOLIC BLOOD PRESSURE: 138 MMHG | DIASTOLIC BLOOD PRESSURE: 82 MMHG

## 2020-02-14 PROCEDURE — 99214 OFFICE O/P EST MOD 30 MIN: CPT

## 2020-02-15 NOTE — ASSESSMENT
[FreeTextEntry1] : Ms. MENDEZ presents with onset of focal seizures in setting of acutre stroke, initially recurring at initial doses of Keppra, with no further recurrence on levetiracetam 1000 q12. Exam is notable for mild L HP, affecting arm most, face minimally, and sparing leg. Now seizure-free x 6 mo on levetiracetam 1000 q12.\par \par Plan\par 1. change levetiracetam  q12 - to mitigate side effects and achieve smoother levels. \par 2. check amb EEG x 48h after discontinuation of levetiracetam (approximately May)\par 3. RTC 2mo - will likely reduce to levetiracetam  qD at that time if doing well. \par

## 2020-02-15 NOTE — HISTORY OF PRESENT ILLNESS
[FreeTextEntry1] : *** 02/14/2020  ***\par Ms. DEB MCGOWAN returns for scheduled follow-up appointment. Ms. MCGOWAN reports that in the interval since her last visit, she is doing well. No interval seizures. She is currently taking levetiracetam  q12. \par \par *** 12/16/2019  ***\par Ms. MCGOWAN reports no interval seizure.  She had CABG x 4 vessels done this fall and is now fairly recovered by still has pain in L arm post-surgery. She needs carotid stent on left, which will be done in Jan 2020.  Ms. MCGOWAN reports increased irritability, possibly due to levetiracetam.  Otherwise no problems.  \par \par *** 09/16/2019  ***\par Ms. MCGOWAN presents for evaluation of seizures that occurred at onset of R MCA stroke from R ICA stenosis in June 2019.  MRI DWI shows signal abnormality in watershed distribution.  Ms. MCGOWAN recalls that initial symptoms was jerking on L arm that lasted about 2 minutes during which she was also not able to speak.  When this occurred she went to [facility]. At [place] she experienced a second similar event and was diagnosed with stroke on CT and resultant seizure and she was started on levetiracetam. She was transferred to [facility] where she underwent carotid stenting. During her hospitalization at [facility], Ms. MCGOWAN had recurrent episodes of twitching in L arm and Keppra dose was increased to 1000mg q12.  Ms. MCGOWAN has continued on levetiracetam and has not had recurrence of focal motor seizures or any other event suspicious for seizures.  Her use of the R arm has improved over time, but her left hand is still impaired. \par \par *** from Stroke NP 08/14/2019 ***\par Ms. Mcgowan is a 53 year old female PMHx Anxiety, 40 pk/yr smoking hx (active time of stroke), does not get regular checkups who presents today for post hospitalization follow up after a right MCA artery ischemic infarct on 6/20/19. She is s/p right internal carotid stent stent place on 6/21/19 with Dr. Barrios.\par \par HPI:\par 54y/o F RH  PMH Anxiety, 40 pk/yr smoking hx(still active), does not get regular checkups presented to [facility] with left arm numbness, spasms starting 20:00 on 6/19. A phone consult was performed with [facility] and during that time she had some worsening of her neurological status but left arm weakness and spasms. At that time she was given Ativan for possible seizure. CT noncontrast and CT angiogram of head and neck revealed right frontal hypodensity suggestive of right MCA infarction in the setting of bilateral severe carotid stenosis. She was then transferred to [facility], on exam NIHSS was 6, alert, awake however unable to move her left arm. She was also noticed to have continuous twitching of left arm and face, drop in oxygen saturation for which she was put on BiPAP.\par She is s/p right internal carotid stent stent place on 6/21/19 with Dr. Barrios. Carotid dopplers performed: unimpeded antegrade flow through the stented right ICA, severe, >70% stenosis of the left ICA, flow limiting stenosis of b/l ECA ,\par On 6/20 AM she was noted to have jerking movements of the left hand to LUE with subsequent fascial twitching and resolved with 2mg of IV Ativan, 1g of Keppra. VEEG with no epileptiform abnormalities or seizures recorded. Continue Keppra 1g BID for seizure ppx, maintain seizure precautions.

## 2020-03-02 ENCOUNTER — APPOINTMENT (OUTPATIENT)
Dept: CARDIOLOGY | Facility: CLINIC | Age: 55
End: 2020-03-02
Payer: COMMERCIAL

## 2020-03-02 ENCOUNTER — NON-APPOINTMENT (OUTPATIENT)
Age: 55
End: 2020-03-02

## 2020-03-02 VITALS
WEIGHT: 232 LBS | SYSTOLIC BLOOD PRESSURE: 181 MMHG | BODY MASS INDEX: 35.16 KG/M2 | HEIGHT: 68 IN | OXYGEN SATURATION: 100 % | HEART RATE: 76 BPM | DIASTOLIC BLOOD PRESSURE: 91 MMHG

## 2020-03-02 VITALS — SYSTOLIC BLOOD PRESSURE: 150 MMHG | DIASTOLIC BLOOD PRESSURE: 80 MMHG

## 2020-03-02 PROCEDURE — 93000 ELECTROCARDIOGRAM COMPLETE: CPT

## 2020-03-02 PROCEDURE — 99215 OFFICE O/P EST HI 40 MIN: CPT

## 2020-03-02 NOTE — HISTORY OF PRESENT ILLNESS
[FreeTextEntry1] : Ms. Mcgowan presents for follow up. Since last being seen she has felt well. She has CAD s/p CABG. No chest pain. No SOB. She had ICM which has recovered. No SOB. She has HTN. Her BP was better at home. She does notice a heaviness or weakness in the leg with exertion. She also note cramping in the calf.

## 2020-03-02 NOTE — DISCUSSION/SUMMARY
[FreeTextEntry1] : Overall doing well. \par BP elevated in the office but coming down. Patient states she has checked at home and other offices and BP is usually in range. Will continue to monitor. \par Given leg symptoms will get MODESTA/PVR\par Continue present medications. \par Increase exercise. \par Follow up in 3 months.

## 2020-03-02 NOTE — PHYSICAL EXAM
[General Appearance - Well Developed] : well developed [Well Groomed] : well groomed [Normal Appearance] : normal appearance [No Deformities] : no deformities [General Appearance - In No Acute Distress] : no acute distress [General Appearance - Well Nourished] : well nourished [Normal Conjunctiva] : the conjunctiva exhibited no abnormalities [Eyelids - No Xanthelasma] : the eyelids demonstrated no xanthelasmas [No Oral Pallor] : no oral pallor [Normal Oral Mucosa] : normal oral mucosa [No Oral Cyanosis] : no oral cyanosis [Normal Jugular Venous A Waves Present] : normal jugular venous A waves present [Normal Jugular Venous V Waves Present] : normal jugular venous V waves present [Respiration, Rhythm And Depth] : normal respiratory rhythm and effort [No Jugular Venous Cabezas A Waves] : no jugular venous cabezas A waves [Auscultation Breath Sounds / Voice Sounds] : lungs were clear to auscultation bilaterally [Exaggerated Use Of Accessory Muscles For Inspiration] : no accessory muscle use [Heart Rate And Rhythm] : heart rate and rhythm were normal [Heart Sounds] : normal S1 and S2 [Abdomen Soft] : soft [Murmurs] : no murmurs present [Abdomen Tenderness] : non-tender [Abdomen Mass (___ Cm)] : no abdominal mass palpated [Abnormal Walk] : normal gait [Gait - Sufficient For Exercise Testing] : the gait was sufficient for exercise testing [Petechial Hemorrhages (___cm)] : no petechial hemorrhages [Nail Clubbing] : no clubbing of the fingernails [Cyanosis, Localized] : no localized cyanosis [] : no ischemic changes [Skin Color & Pigmentation] : normal skin color and pigmentation [No Skin Ulcers] : no skin ulcer [No Venous Stasis] : no venous stasis [Skin Lesions] : no skin lesions [No Xanthoma] : no  xanthoma was observed [Oriented To Time, Place, And Person] : oriented to person, place, and time [Affect] : the affect was normal [Mood] : the mood was normal [No Anxiety] : not feeling anxious [FreeTextEntry1] : Well healing surgical wounds.

## 2020-03-16 ENCOUNTER — RX RENEWAL (OUTPATIENT)
Age: 55
End: 2020-03-16

## 2020-04-20 ENCOUNTER — APPOINTMENT (OUTPATIENT)
Dept: NEUROLOGY | Facility: CLINIC | Age: 55
End: 2020-04-20

## 2020-04-29 ENCOUNTER — APPOINTMENT (OUTPATIENT)
Dept: NEUROLOGY | Facility: CLINIC | Age: 55
End: 2020-04-29
Payer: COMMERCIAL

## 2020-04-29 PROCEDURE — 99213 OFFICE O/P EST LOW 20 MIN: CPT | Mod: 95

## 2020-04-29 RX ORDER — LEVETIRACETAM 750 MG/1
750 TABLET, EXTENDED RELEASE ORAL
Qty: 60 | Refills: 0 | Status: DISCONTINUED | COMMUNITY
Start: 2020-01-13

## 2020-04-29 RX ORDER — CARVEDILOL 12.5 MG/1
12.5 TABLET, FILM COATED ORAL
Qty: 90 | Refills: 0 | Status: DISCONTINUED | COMMUNITY
Start: 2019-08-07 | End: 2020-04-29

## 2020-04-29 NOTE — ASSESSMENT
[FreeTextEntry1] : Ms. MENDEZ presents with onset of focal seizures in setting of acute stroke, initially recurring at initial doses of Keppra, with no further recurrence on levetiracetam 1000 q12. Exam is notable for mild L HP, affecting arm most, face minimally, and sparing leg. Now seizure-free x 8 mo, and no seizures for 2 mo on levetiracetam  q12.\par \par Plan\par 1. continue levetiracetam  q12 - to mitigate side effects and achieve smoother levels. In view of COVID19 crisis, we will continue current dose until infection risk subsides unless levetiracetam side effects become a problem. \par 2. check amb EEG x 48h after discontinuation of levetiracetam - whenever that is done\par 3. RTC 3 mo- may be televisit - at patient's discretion. \par \par

## 2020-04-29 NOTE — REASON FOR VISIT
[Home] : at home, [unfilled] , at the time of the visit. [Other Location: e.g. Home (Enter Location, City,State)___] : at [unfilled] [Patient] : the patient [Self] : self [Follow-Up: _____] : a [unfilled] follow-up visit

## 2020-04-29 NOTE — HISTORY OF PRESENT ILLNESS
[FreeTextEntry1] : *** 2020  ***\par -Appointment was conducted by video-conference in place of office appointment due to due to heightened concern for coronavirus infection risk.\par -Verbal consent given on 2020 at 10:18 by the patient DEB MCGOWAN ( Dec 29 1965) who understands that tele-visit will be charged to insurance and may involve co-pay for patient.\par -Physician location: home\par -Patient location: home\par -Individuals on call: Dr. Raines, DEB MCGOWAN\par  \par Ms. MCGOWAN is doing well on levetiracetam  q12. Denies interval seizures. Denies side effects like irritabilty or worsening mood. Anxious about COVID19 and apprehensive about reducing dose, as was our original plan. \par *** 2020  ***\par Ms. DEB MCGOWAN returns for scheduled follow-up appointment. Ms. MCGOWAN reports that in the interval since her last visit, she is doing well. No interval seizures. She is currently taking levetiracetam  q12. \par \par *** 2019  ***\par Ms. MCGOWAN reports no interval seizure.  She had CABG x 4 vessels done this fall and is now fairly recovered by still has pain in L arm post-surgery. She needs carotid stent on left, which will be done in 2020.  Ms. MCGOWAN reports increased irritability, possibly due to levetiracetam.  Otherwise no problems.  \par \par *** 2019  ***\par Ms. MCGOWAN presents for evaluation of seizures that occurred at onset of R MCA stroke from R ICA stenosis in 2019.  MRI DWI shows signal abnormality in watershed distribution.  Ms. MCGOWAN recalls that initial symptoms was jerking on L arm that lasted about 2 minutes during which she was also not able to speak.  When this occurred she went to [facility]. At [place] she experienced a second similar event and was diagnosed with stroke on CT and resultant seizure and she was started on levetiracetam. She was transferred to [facility] where she underwent carotid stenting. During her hospitalization at [facility], Ms. MCGOWAN had recurrent episodes of twitching in L arm and Keppra dose was increased to 1000mg q12.  Ms. MCGOWAN has continued on levetiracetam and has not had recurrence of focal motor seizures or any other event suspicious for seizures.  Her use of the R arm has improved over time, but her left hand is still impaired. \par \par *** from Stroke NP 2019 ***\par Ms. Mcgowan is a 53 year old female PMHx Anxiety, 40 pk/yr smoking hx (active time of stroke), does not get regular checkups who presents today for post hospitalization follow up after a right MCA artery ischemic infarct on 19. She is s/p right internal carotid stent stent place on 19 with Dr. Barrios.\par \par HPI:\par 54y/o F RH  PMH Anxiety, 40 pk/yr smoking hx(still active), does not get regular checkups presented to [facility] with left arm numbness, spasms starting 20:00 on . A phone consult was performed with [facility] and during that time she had some worsening of her neurological status but left arm weakness and spasms. At that time she was given Ativan for possible seizure. CT noncontrast and CT angiogram of head and neck revealed right frontal hypodensity suggestive of right MCA infarction in the setting of bilateral severe carotid stenosis. She was then transferred to [facility], on exam NIHSS was 6, alert, awake however unable to move her left arm. She was also noticed to have continuous twitching of left arm and face, drop in oxygen saturation for which she was put on BiPAP.\par She is s/p right internal carotid stent stent place on 19 with Dr. Barrios. Carotid dopplers performed: unimpeded antegrade flow through the stented right ICA, severe, >70% stenosis of the left ICA, flow limiting stenosis of b/l ECA ,\par On  AM she was noted to have jerking movements of the left hand to LUE with subsequent fascial twitching and resolved with 2mg of IV Ativan, 1g of Keppra. VEEG with no epileptiform abnormalities or seizures recorded. Continue Keppra 1g BID for seizure ppx, maintain seizure precautions.

## 2020-06-03 ENCOUNTER — NON-APPOINTMENT (OUTPATIENT)
Age: 55
End: 2020-06-03

## 2020-06-03 ENCOUNTER — APPOINTMENT (OUTPATIENT)
Dept: CARDIOLOGY | Facility: CLINIC | Age: 55
End: 2020-06-03
Payer: COMMERCIAL

## 2020-06-03 VITALS
OXYGEN SATURATION: 100 % | WEIGHT: 232 LBS | SYSTOLIC BLOOD PRESSURE: 128 MMHG | HEIGHT: 68 IN | DIASTOLIC BLOOD PRESSURE: 83 MMHG | BODY MASS INDEX: 35.16 KG/M2 | HEART RATE: 91 BPM

## 2020-06-03 PROCEDURE — 99214 OFFICE O/P EST MOD 30 MIN: CPT

## 2020-06-03 PROCEDURE — 93000 ELECTROCARDIOGRAM COMPLETE: CPT

## 2020-06-03 NOTE — REASON FOR VISIT
[Coronary Artery Disease] : coronary artery disease [Hyperlipidemia] : hyperlipidemia [Follow-Up - Clinic] : a clinic follow-up of [Hypertension] : hypertension

## 2020-06-03 NOTE — HISTORY OF PRESENT ILLNESS
[FreeTextEntry1] : Ms. Mcgowan presents for follow up. Since last being seen she has felt well. She brings a log of BP. It was high in April but has been much better in May.

## 2020-06-03 NOTE — DISCUSSION/SUMMARY
[FreeTextEntry1] : Overall doing well. \par BP is acceptable. She has no symptoms except for her ongoing LE complaints which may be claudication. MODESTA with [facility] were ordered but she was unable to get due to Corona virus. She will now schedule. \par Continue present medications.

## 2020-06-03 NOTE — PHYSICAL EXAM
[General Appearance - Well Developed] : well developed [Well Groomed] : well groomed [Normal Appearance] : normal appearance [No Deformities] : no deformities [General Appearance - Well Nourished] : well nourished [Normal Conjunctiva] : the conjunctiva exhibited no abnormalities [General Appearance - In No Acute Distress] : no acute distress [Eyelids - No Xanthelasma] : the eyelids demonstrated no xanthelasmas [Normal Oral Mucosa] : normal oral mucosa [No Oral Pallor] : no oral pallor [No Oral Cyanosis] : no oral cyanosis [Normal Jugular Venous A Waves Present] : normal jugular venous A waves present [Normal Jugular Venous V Waves Present] : normal jugular venous V waves present [Respiration, Rhythm And Depth] : normal respiratory rhythm and effort [No Jugular Venous Cabezas A Waves] : no jugular venous cabezas A waves [Auscultation Breath Sounds / Voice Sounds] : lungs were clear to auscultation bilaterally [Exaggerated Use Of Accessory Muscles For Inspiration] : no accessory muscle use [Heart Sounds] : normal S1 and S2 [Murmurs] : no murmurs present [Heart Rate And Rhythm] : heart rate and rhythm were normal [Abdomen Soft] : soft [Abdomen Tenderness] : non-tender [Abdomen Mass (___ Cm)] : no abdominal mass palpated [Gait - Sufficient For Exercise Testing] : the gait was sufficient for exercise testing [Abnormal Walk] : normal gait [Cyanosis, Localized] : no localized cyanosis [Nail Clubbing] : no clubbing of the fingernails [Petechial Hemorrhages (___cm)] : no petechial hemorrhages [Skin Color & Pigmentation] : normal skin color and pigmentation [] : no rash [Skin Lesions] : no skin lesions [No Venous Stasis] : no venous stasis [No Skin Ulcers] : no skin ulcer [No Xanthoma] : no  xanthoma was observed [Oriented To Time, Place, And Person] : oriented to person, place, and time [No Anxiety] : not feeling anxious [Mood] : the mood was normal [Affect] : the affect was normal [FreeTextEntry1] : Well healing surgical wounds.

## 2020-07-16 ENCOUNTER — APPOINTMENT (OUTPATIENT)
Dept: NEUROLOGY | Facility: CLINIC | Age: 55
End: 2020-07-16
Payer: COMMERCIAL

## 2020-07-21 ENCOUNTER — APPOINTMENT (OUTPATIENT)
Dept: ULTRASOUND IMAGING | Facility: HOSPITAL | Age: 55
End: 2020-07-21
Payer: COMMERCIAL

## 2020-07-21 ENCOUNTER — RESULT REVIEW (OUTPATIENT)
Age: 55
End: 2020-07-21

## 2020-07-21 ENCOUNTER — OUTPATIENT (OUTPATIENT)
Dept: OUTPATIENT SERVICES | Facility: HOSPITAL | Age: 55
LOS: 1 days | End: 2020-07-21
Payer: COMMERCIAL

## 2020-07-21 DIAGNOSIS — I77.9 DISORDER OF ARTERIES AND ARTERIOLES, UNSPECIFIED: ICD-10-CM

## 2020-07-21 DIAGNOSIS — I10 ESSENTIAL (PRIMARY) HYPERTENSION: ICD-10-CM

## 2020-07-21 DIAGNOSIS — Z00.00 ENCOUNTER FOR GENERAL ADULT MEDICAL EXAMINATION WITHOUT ABNORMAL FINDINGS: ICD-10-CM

## 2020-07-21 DIAGNOSIS — Z86.73 PERSONAL HISTORY OF TRANSIENT ISCHEMIC ATTACK (TIA), AND CEREBRAL INFARCTION WITHOUT RESIDUAL DEFICITS: Chronic | ICD-10-CM

## 2020-07-21 PROCEDURE — 93880 EXTRACRANIAL BILAT STUDY: CPT | Mod: 26

## 2020-07-21 PROCEDURE — 93880 EXTRACRANIAL BILAT STUDY: CPT

## 2020-07-21 PROCEDURE — 93923 UPR/LXTR ART STDY 3+ LVLS: CPT

## 2020-07-21 PROCEDURE — 93923 UPR/LXTR ART STDY 3+ LVLS: CPT | Mod: 26

## 2020-09-02 ENCOUNTER — APPOINTMENT (OUTPATIENT)
Dept: CARDIOLOGY | Facility: CLINIC | Age: 55
End: 2020-09-02

## 2020-09-08 ENCOUNTER — APPOINTMENT (OUTPATIENT)
Dept: NEUROLOGY | Facility: CLINIC | Age: 55
End: 2020-09-08
Payer: COMMERCIAL

## 2020-09-08 PROCEDURE — 99213 OFFICE O/P EST LOW 20 MIN: CPT | Mod: 95

## 2020-09-17 ENCOUNTER — APPOINTMENT (OUTPATIENT)
Dept: NEUROLOGY | Facility: CLINIC | Age: 55
End: 2020-09-17
Payer: COMMERCIAL

## 2020-09-17 VITALS
HEIGHT: 68 IN | BODY MASS INDEX: 36.37 KG/M2 | DIASTOLIC BLOOD PRESSURE: 83 MMHG | SYSTOLIC BLOOD PRESSURE: 149 MMHG | HEART RATE: 81 BPM | WEIGHT: 240 LBS

## 2020-09-17 VITALS — TEMPERATURE: 95.4 F

## 2020-09-17 PROCEDURE — 99214 OFFICE O/P EST MOD 30 MIN: CPT

## 2020-09-17 NOTE — HISTORY OF PRESENT ILLNESS
[FreeTextEntry1] : *** 2020  ***\par Ms. DEB MCGOWAN returns for scheduled follow-up appointment. Ms. MCGOWAN reports that in the interval since her last visit, she is doing well. No inteval seizures.  She is taking levetiracetam  q12.\par Mood is good, no side effects. She will be seeing Dr. Barrios in Oct to plan for management of L ICA stenosis.  Ms. MCGOWAN follows also with Yovana Thakkar NP of stroke service. \par \par *** 2020  ***\par -Appointment was conducted by video-conference in place of office appointment due to due to heightened concern for coronavirus infection risk.\par -Verbal consent given on 2020 at 10:18 by the patient DEB MCGOWAN ( Dec 29 1965) who understands that tele-visit will be charged to insurance and may involve co-pay for patient.\par -Physician location: home\par -Patient location: home\par -Individuals on call: Dr. Raines, DEB MCGOWAN\par  \par Ms. MCGOWAN is doing well on levetiracetam  q12. Denies interval seizures. Denies side effects like irritabilty or worsening mood. Anxious about COVID19 and apprehensive about reducing dose, as was our original plan. \par *** 2020  ***\par Ms. DEB MCGOWAN returns for scheduled follow-up appointment. Ms. MCGOWAN reports that in the interval since her last visit, she is doing well. No interval seizures. She is currently taking levetiracetam  q12. \par \par *** 2019  ***\par Ms. MCGOWAN reports no interval seizure.  She had CABG x 4 vessels done this fall and is now fairly recovered by still has pain in L arm post-surgery. She needs carotid stent on left, which will be done in 2020.  Ms. MCGOWAN reports increased irritability, possibly due to levetiracetam.  Otherwise no problems.  \par \par *** 2019  ***\par Ms. MCGOWAN presents for evaluation of seizures that occurred at onset of R MCA stroke from R ICA stenosis in 2019.  MRI DWI shows signal abnormality in watershed distribution.  Ms. MCGOWAN recalls that initial symptoms was jerking on L arm that lasted about 2 minutes during which she was also not able to speak.  When this occurred she went to [facility]. At [place] she experienced a second similar event and was diagnosed with stroke on CT and resultant seizure and she was started on levetiracetam. She was transferred to [facility] where she underwent carotid stenting. During her hospitalization at [facility], Ms. MCGOWAN had recurrent episodes of twitching in L arm and Keppra dose was increased to 1000mg q12.  Ms. MCGOWAN has continued on levetiracetam and has not had recurrence of focal motor seizures or any other event suspicious for seizures.  Her use of the R arm has improved over time, but her left hand is still impaired. \par \par *** from Stroke NP 2019 ***\par Ms. Mcgowan is a 53 year old female PMHx Anxiety, 40 pk/yr smoking hx (active time of stroke), does not get regular checkups who presents today for post hospitalization follow up after a right MCA artery ischemic infarct on 19. She is s/p right internal carotid stent stent place on 19 with Dr. Barrios.\par \par HPI:\par 52y/o F RH  PMH Anxiety, 40 pk/yr smoking hx(still active), does not get regular checkups presented to [facility] with left arm numbness, spasms starting 20:00 on . A phone consult was performed with [facility] and during that time she had some worsening of her neurological status but left arm weakness and spasms. At that time she was given Ativan for possible seizure. CT noncontrast and CT angiogram of head and neck revealed right frontal hypodensity suggestive of right MCA infarction in the setting of bilateral severe carotid stenosis. She was then transferred to [facility], on exam NIHSS was 6, alert, awake however unable to move her left arm. She was also noticed to have continuous twitching of left arm and face, drop in oxygen saturation for which she was put on BiPAP.\par She is s/p right internal carotid stent stent place on 19 with Dr. Barrios. Carotid dopplers performed: unimpeded antegrade flow through the stented right ICA, severe, >70% stenosis of the left ICA, flow limiting stenosis of b/l ECA ,\par On  AM she was noted to have jerking movements of the left hand to LUE with subsequent fascial twitching and resolved with 2mg of IV Ativan, 1g of Keppra. VEEG with no epileptiform abnormalities or seizures recorded. Continue Keppra 1g BID for seizure ppx, maintain seizure precautions.

## 2020-09-17 NOTE — ASSESSMENT
[FreeTextEntry1] : Ms. MENDEZ presents with onset of focal seizures in setting of acute stroke, initially recurring at initial doses of Keppra, with no further recurrence on levetiracetam 1000 q12. Exam is notable for mild L HP, affecting arm most, face minimally, and sparing leg. Now seizure-free x 8 mo, and no seizures for 2 mo on levetiracetam  q12.\par \par Plan\par 1. reduce levetiracetam  qD\par 2. Ms. MENDEZ should get instructions for enrolling in patient portal \par 3. RTC 4 mo. At next visit will likely reduce levetiracetam  qD, and DC thereafter if she remains seizure free. \par 4. I will recommend to Dr. Barrios that Ms. MENDEZ get bolus of levetiracetam during time of L ICA procedure to minimize risk of seizure.  Ms. MENDEZ will let me know when procedure is scheduled.

## 2020-09-17 NOTE — PHYSICAL EXAM
[FreeTextEntry1] : alert and oriented x 3, speech fluent, names easily, follows requests, good recall for recent and remote events.\par EOM full without sustained nystagmus, PERRL, face symmetrical, no dysarthria\par Motor - R UE - 5/5;  L drift, L UE 4/5,  LE power 5/5\par Sensory - not tested\par Coord - no tremor, ataxia\par Gait - stands without difficulty

## 2020-09-21 ENCOUNTER — TRANSCRIPTION ENCOUNTER (OUTPATIENT)
Age: 55
End: 2020-09-21

## 2020-09-30 ENCOUNTER — APPOINTMENT (OUTPATIENT)
Dept: CARDIOLOGY | Facility: CLINIC | Age: 55
End: 2020-09-30
Payer: COMMERCIAL

## 2020-09-30 ENCOUNTER — NON-APPOINTMENT (OUTPATIENT)
Age: 55
End: 2020-09-30

## 2020-09-30 VITALS
OXYGEN SATURATION: 99 % | HEIGHT: 68 IN | SYSTOLIC BLOOD PRESSURE: 144 MMHG | DIASTOLIC BLOOD PRESSURE: 85 MMHG | WEIGHT: 240 LBS | HEART RATE: 81 BPM | BODY MASS INDEX: 36.37 KG/M2

## 2020-09-30 VITALS — SYSTOLIC BLOOD PRESSURE: 160 MMHG | DIASTOLIC BLOOD PRESSURE: 88 MMHG

## 2020-09-30 VITALS — SYSTOLIC BLOOD PRESSURE: 152 MMHG | DIASTOLIC BLOOD PRESSURE: 88 MMHG

## 2020-09-30 DIAGNOSIS — E78.5 HYPERLIPIDEMIA, UNSPECIFIED: ICD-10-CM

## 2020-09-30 PROCEDURE — 99215 OFFICE O/P EST HI 40 MIN: CPT

## 2020-09-30 PROCEDURE — 93000 ELECTROCARDIOGRAM COMPLETE: CPT

## 2020-09-30 NOTE — HISTORY OF PRESENT ILLNESS
[FreeTextEntry1] : Ms. Mcgowan presents for follow up. Since last being seen she has followed with neurology. She was noted to have possible restenosis of carotid stent and contralateral carotid disease as well. She will see Dr. Barrios tomorrow via TEB. She has CAD s/p CABG. No chest pain. She has HTN on medical therapy. She has HLD on statin therapy. She has PAD noted on vascular study. She has symptoms that can be consistent with claudication. She has HF with recovered EF. No shortness of breath.

## 2020-09-30 NOTE — REASON FOR VISIT
[Follow-Up - Clinic] : a clinic follow-up of [Coronary Artery Disease] : coronary artery disease [Hyperlipidemia] : hyperlipidemia [Hypertension] : hypertension [FreeTextEntry1] : Carotid disease

## 2020-09-30 NOTE — DISCUSSION/SUMMARY
[FreeTextEntry1] : Will need to get recent lipid results from her PMD. She was told her results were ok. \par She should see Dr. Moe of vascular medicine given extent of vascular disease. \par BP is elevated in the office. She notes at home that BP is usually in range. I do not note as much of a difference in the left and right upper extremities. \par Continue present medications.

## 2020-10-01 ENCOUNTER — APPOINTMENT (OUTPATIENT)
Dept: NEUROSURGERY | Facility: CLINIC | Age: 55
End: 2020-10-01
Payer: COMMERCIAL

## 2020-10-01 PROCEDURE — 99214 OFFICE O/P EST MOD 30 MIN: CPT | Mod: 95

## 2020-10-02 NOTE — RESULTS/DATA
[FreeTextEntry1] : EXAM: CAROTID DUPLEX BILATERAL \par \par \par PROCEDURE DATE: 07/21/2020 \par \par \par \par \par INTERPRETATION: Technique: Grayscale, color and spectral Doppler \par examination of both carotid arteries was performed. \par \par HISTORY: A prior examination, dated 10/3/2019, showed a stented right \par carotid artery and a high-grade stenosis of the left internal carotid \par artery, follow-up examination. \par \par There is a stent extending from the right distal common carotid artery into \par the proximal right internal carotid artery. \par \par There is antegrade flow through the stent. \par \par Although the stent is patent there is turbulent flow with elevated \par velocities measuring as high as 316/67cm/s within the stent. \par \par Blood flow velocities are as follows: \par \par RIGHT: PROX CCA = 90 ; IN-STENT 302/70, 315/67, 89/20; DIST ICA = 79 ; \par ECA = 302 \par \par LEFT : PROX CCA = 73 ; DIST CCA = 84 ; PROX ICA = 302/56 ; DIST ICA \par = 113 ; ECA = 283 \par \par There is antegrade flow through both vertebral arteries. \par \par IMPRESSION: There is a stented right carotid artery with a severe in-stent \par stenosis. \par \par There is a severe, greater than 70% stenosis of the native left internal \par carotid artery. \par \par There are flow-limiting stenoses of the jailed right external carotid artery \par and the native left external carotid artery. \par \par Measurement of carotid stenosis is based on velocity parameters that \par correlate the residual internal carotid diameter with that of the more \par distal vessel in accordance with a method such as the [place] \par Symptomatic Carotid Endarterectomy Trial (NASCET). \par \par \par

## 2020-10-02 NOTE — REASON FOR VISIT
[Follow-Up: _____] : a [unfilled] follow-up visit [Home] : at home, [unfilled] , at the time of the visit. [Medical Office: ([facility])___] : at the medical office located in  [Verbal consent obtained from patient] : the patient, [unfilled] [FreeTextEntry1] : Mariana Mcgowan is a 54yr old female here for a follow up visit. To review she presented to [facility] with left arm weakness and spasm. CT CTA showed RMCA infarction in the setting of bilateral severe carotid stenosis. She was transferred to [facility]. She underwent angioplasty and stenting of symptomatic right carotid stenosis 6/21/2019. Tolerated procedure well was placed on dual antiplatelet therapy with aspirn and plavix. PRU therapeutic at 84. Post procedure carotid doppler ultrasound demonstrated patency of the stent. She had left facial twitching and started on keppra 1gram twice a day. ECHO demonstrated EF of 30-35%. She is s/p CABG 10/10/2019. She has made a complete recovery from her CABG surgery and prior stroke. She recently had carotid doppler ultrasound done in July 2020 and is here to review those results. Today she feel well denies any motor,sensory, speech, visual abnormalities. \par

## 2020-10-02 NOTE — ASSESSMENT
[FreeTextEntry1] : Impression: 54yr old female s/p right carotid angioplasty and stenting for symptomatic stenosis 6/21/2019\par \par Plan: \par Patient recovered fom her stroke and is neurologically intact\par Carotid doppler from 1/16/2020 showed SANDIE  LICA  \par Carotid doppler 7/21/2020 RIGHT IN-STENT 315/67, DIST ICA = 79 ; LEFT  PROX ICA = 302/56 ;  \par Discussed with patient these findings demonstrate there is potential narrowing in the stent that was placed on the right side. Recommend cerebral angiography now to evaluate the right side and possibly intervene with angioplasty should there be in stent stenosis, if not we will proceed with angioplasty and stenting of the left carotid artery stenosis. The risks, benefits, alternatives, complications and personnel associated with the procedure were discussed with the patient and the family in great detail.  They request that we proceed.\par Continue aspirin 325mg daily \par Continue Plavix 75mg daily (PRU 84)\par

## 2020-10-04 ENCOUNTER — TRANSCRIPTION ENCOUNTER (OUTPATIENT)
Age: 55
End: 2020-10-04

## 2020-10-05 ENCOUNTER — TRANSCRIPTION ENCOUNTER (OUTPATIENT)
Age: 55
End: 2020-10-05

## 2020-10-09 ENCOUNTER — TRANSCRIPTION ENCOUNTER (OUTPATIENT)
Age: 55
End: 2020-10-09

## 2020-10-16 ENCOUNTER — TRANSCRIPTION ENCOUNTER (OUTPATIENT)
Age: 55
End: 2020-10-16

## 2020-10-16 NOTE — PHYSICAL THERAPY INITIAL EVALUATION ADULT - ASR WT BEARING STATUS EVAL
[address] 36 October 16, 2020 RE: Gaby Ruth To Whom It May Concern, This is to certify that Gaby Ruth may return to work on 10/30/2020. Please feel free to contact my office if you have any questions or concerns. Thank you for your assistance in this matter. Sincerely, Fern Bui MD 
 
 
 
 no weight-bearing restrictions

## 2020-10-20 ENCOUNTER — TRANSCRIPTION ENCOUNTER (OUTPATIENT)
Age: 55
End: 2020-10-20

## 2020-10-22 ENCOUNTER — OUTPATIENT (OUTPATIENT)
Dept: OUTPATIENT SERVICES | Facility: HOSPITAL | Age: 55
LOS: 1 days | End: 2020-10-22
Payer: COMMERCIAL

## 2020-10-22 VITALS
HEIGHT: 68 IN | TEMPERATURE: 98 F | WEIGHT: 268.96 LBS | OXYGEN SATURATION: 97 % | HEART RATE: 78 BPM | RESPIRATION RATE: 18 BRPM | DIASTOLIC BLOOD PRESSURE: 81 MMHG | SYSTOLIC BLOOD PRESSURE: 164 MMHG

## 2020-10-22 DIAGNOSIS — I77.9 DISORDER OF ARTERIES AND ARTERIOLES, UNSPECIFIED: ICD-10-CM

## 2020-10-22 DIAGNOSIS — Z86.79 PERSONAL HISTORY OF OTHER DISEASES OF THE CIRCULATORY SYSTEM: ICD-10-CM

## 2020-10-22 DIAGNOSIS — Z01.818 ENCOUNTER FOR OTHER PREPROCEDURAL EXAMINATION: ICD-10-CM

## 2020-10-22 DIAGNOSIS — Z86.73 PERSONAL HISTORY OF TRANSIENT ISCHEMIC ATTACK (TIA), AND CEREBRAL INFARCTION WITHOUT RESIDUAL DEFICITS: Chronic | ICD-10-CM

## 2020-10-22 DIAGNOSIS — Z95.1 PRESENCE OF AORTOCORONARY BYPASS GRAFT: Chronic | ICD-10-CM

## 2020-10-22 LAB
ANION GAP SERPL CALC-SCNC: 10 MMOL/L — SIGNIFICANT CHANGE UP (ref 5–17)
BLD GP AB SCN SERPL QL: NEGATIVE — SIGNIFICANT CHANGE UP
BUN SERPL-MCNC: 18 MG/DL — SIGNIFICANT CHANGE UP (ref 7–23)
CALCIUM SERPL-MCNC: 9.3 MG/DL — SIGNIFICANT CHANGE UP (ref 8.4–10.5)
CHLORIDE SERPL-SCNC: 99 MMOL/L — SIGNIFICANT CHANGE UP (ref 96–108)
CO2 SERPL-SCNC: 25 MMOL/L — SIGNIFICANT CHANGE UP (ref 22–31)
CREAT SERPL-MCNC: 0.97 MG/DL — SIGNIFICANT CHANGE UP (ref 0.5–1.3)
GLUCOSE SERPL-MCNC: 156 MG/DL — HIGH (ref 70–99)
HCT VFR BLD CALC: 44 % — SIGNIFICANT CHANGE UP (ref 34.5–45)
HGB BLD-MCNC: 13.7 G/DL — SIGNIFICANT CHANGE UP (ref 11.5–15.5)
MCHC RBC-ENTMCNC: 25.1 PG — LOW (ref 27–34)
MCHC RBC-ENTMCNC: 31.1 GM/DL — LOW (ref 32–36)
MCV RBC AUTO: 80.7 FL — SIGNIFICANT CHANGE UP (ref 80–100)
NRBC # BLD: 0 /100 WBCS — SIGNIFICANT CHANGE UP (ref 0–0)
PA ADP PRP-ACNC: 113 PRU — LOW (ref 194–417)
PLATELET # BLD AUTO: 326 K/UL — SIGNIFICANT CHANGE UP (ref 150–400)
PLATELET RESPONSE ASPIRIN RESULT: 560 ARU — SIGNIFICANT CHANGE UP (ref 350–700)
POTASSIUM SERPL-MCNC: 4 MMOL/L — SIGNIFICANT CHANGE UP (ref 3.5–5.3)
POTASSIUM SERPL-SCNC: 4 MMOL/L — SIGNIFICANT CHANGE UP (ref 3.5–5.3)
RBC # BLD: 5.45 M/UL — HIGH (ref 3.8–5.2)
RBC # FLD: 14.3 % — SIGNIFICANT CHANGE UP (ref 10.3–14.5)
RH IG SCN BLD-IMP: POSITIVE — SIGNIFICANT CHANGE UP
SODIUM SERPL-SCNC: 134 MMOL/L — LOW (ref 135–145)
WBC # BLD: 9.24 K/UL — SIGNIFICANT CHANGE UP (ref 3.8–10.5)
WBC # FLD AUTO: 9.24 K/UL — SIGNIFICANT CHANGE UP (ref 3.8–10.5)

## 2020-10-22 PROCEDURE — 86901 BLOOD TYPING SEROLOGIC RH(D): CPT

## 2020-10-22 PROCEDURE — 85576 BLOOD PLATELET AGGREGATION: CPT

## 2020-10-22 PROCEDURE — 86850 RBC ANTIBODY SCREEN: CPT

## 2020-10-22 PROCEDURE — G0463: CPT

## 2020-10-22 PROCEDURE — 80048 BASIC METABOLIC PNL TOTAL CA: CPT

## 2020-10-22 PROCEDURE — 86900 BLOOD TYPING SEROLOGIC ABO: CPT

## 2020-10-22 PROCEDURE — 85027 COMPLETE CBC AUTOMATED: CPT

## 2020-10-22 NOTE — H&P PST ADULT - NSICDXFAMILYHX_GEN_ALL_CORE_FT
FAMILY HISTORY:  Family history of breast cancer, Mother  Family history of myocardial infarction, Father

## 2020-10-22 NOTE — H&P PST ADULT - OTHER CARE PROVIDERS
DR. SAI WATKINS- [facility], SCHEDULED TO SEE VASCULAR CARDS - DR. JUAN LUIS GIBSON FOR LOWER EXTREMITY VASCULAR PROBLEMS

## 2020-10-22 NOTE — H&P PST ADULT - HISTORY OF PRESENT ILLNESS
54 YEAR OLD FEMALE WITH PMH OF CAD, QUADRUPLE CABG 10/2019, CVA 6/2019 C/B SEIZURES, LEFT SIDED WEAKNESS, NIMESH, HTN, HLD,  BILATERAL CAROTID ARTERY DISEASE, PRIOR RIGHT CAROTID STENTING, LOWER EXTREMITY (LEFT) PVD  WAS FOUND TO HAVE RIGHT CAROTID ARTERY STENT OCCLUSION ON CAROTID US. SHE NOW PRESENTS TO PST FOR SCREENING PRIOR TO CAROTID ARTERY ANGIOPLASTY AND STENTING.    ASPIRIN DOSE WAS INCREASED TO 325MG BY DR. WILLIAM 2 WEEKS AGO   COVID SWAB SCHEDULED FOR 10/24

## 2020-10-22 NOTE — H&P PST ADULT - NSICDXPASTMEDICALHX_GEN_ALL_CORE_FT
PAST MEDICAL HISTORY:  Anxiety     Atherosclerotic heart disease of native coronary artery without angina pectoris     CVA (cerebrovascular accident) Ischemic Right MCA stroke    Hyperlipemia     Hypertension     NIMESH (obstructive sleep apnea) used BIPAP only while hospitalized    Seizure

## 2020-10-22 NOTE — H&P PST ADULT - NSICDXPROBLEM_GEN_ALL_CORE_FT
PROBLEM DIAGNOSES  Problem: History of carotid artery disease  Assessment and Plan: Right Carotid Angioplasty and Stenting  patient instructed to continue with Aspirin 325mg and Plavix 75mg as directed by Dr. Barrios

## 2020-10-22 NOTE — H&P PST ADULT - NSICDXPASTSURGICALHX_GEN_ALL_CORE_FT
PAST SURGICAL HISTORY:  History of ischemic right MCA stroke s/p SANDIE stent on 6/21/19    S/P CABG x 4 10/2019

## 2020-10-24 ENCOUNTER — OUTPATIENT (OUTPATIENT)
Dept: OUTPATIENT SERVICES | Facility: HOSPITAL | Age: 55
LOS: 1 days | End: 2020-10-24
Payer: COMMERCIAL

## 2020-10-24 DIAGNOSIS — Z86.73 PERSONAL HISTORY OF TRANSIENT ISCHEMIC ATTACK (TIA), AND CEREBRAL INFARCTION WITHOUT RESIDUAL DEFICITS: Chronic | ICD-10-CM

## 2020-10-24 DIAGNOSIS — Z11.59 ENCOUNTER FOR SCREENING FOR OTHER VIRAL DISEASES: ICD-10-CM

## 2020-10-24 DIAGNOSIS — Z95.1 PRESENCE OF AORTOCORONARY BYPASS GRAFT: Chronic | ICD-10-CM

## 2020-10-24 PROBLEM — G47.33 OBSTRUCTIVE SLEEP APNEA (ADULT) (PEDIATRIC): Chronic | Status: ACTIVE | Noted: 2020-10-22

## 2020-10-24 LAB — SARS-COV-2 RNA SPEC QL NAA+PROBE: SIGNIFICANT CHANGE UP

## 2020-10-24 PROCEDURE — U0003: CPT

## 2020-10-27 ENCOUNTER — INPATIENT (INPATIENT)
Facility: HOSPITAL | Age: 55
LOS: 1 days | Discharge: HOME | DRG: 34 | End: 2020-10-29
Attending: NEUROLOGICAL SURGERY | Admitting: NEUROLOGICAL SURGERY
Payer: COMMERCIAL

## 2020-10-27 ENCOUNTER — APPOINTMENT (OUTPATIENT)
Dept: NEUROSURGERY | Facility: HOSPITAL | Age: 55
End: 2020-10-27
Payer: COMMERCIAL

## 2020-10-27 ENCOUNTER — TRANSCRIPTION ENCOUNTER (OUTPATIENT)
Age: 55
End: 2020-10-27

## 2020-10-27 VITALS
WEIGHT: 268.96 LBS | OXYGEN SATURATION: 97 % | RESPIRATION RATE: 18 BRPM | SYSTOLIC BLOOD PRESSURE: 164 MMHG | DIASTOLIC BLOOD PRESSURE: 81 MMHG | TEMPERATURE: 98 F | HEART RATE: 78 BPM | HEIGHT: 68 IN

## 2020-10-27 DIAGNOSIS — Z86.73 PERSONAL HISTORY OF TRANSIENT ISCHEMIC ATTACK (TIA), AND CEREBRAL INFARCTION WITHOUT RESIDUAL DEFICITS: Chronic | ICD-10-CM

## 2020-10-27 DIAGNOSIS — I77.9 DISORDER OF ARTERIES AND ARTERIOLES, UNSPECIFIED: ICD-10-CM

## 2020-10-27 DIAGNOSIS — Z95.1 PRESENCE OF AORTOCORONARY BYPASS GRAFT: Chronic | ICD-10-CM

## 2020-10-27 LAB
A1C WITH ESTIMATED AVERAGE GLUCOSE RESULT: 7 % — HIGH (ref 4–5.6)
ANION GAP SERPL CALC-SCNC: 10 MMOL/L — SIGNIFICANT CHANGE UP (ref 5–17)
BUN SERPL-MCNC: 21 MG/DL — SIGNIFICANT CHANGE UP (ref 7–23)
CALCIUM SERPL-MCNC: 8.6 MG/DL — SIGNIFICANT CHANGE UP (ref 8.4–10.5)
CHLORIDE SERPL-SCNC: 106 MMOL/L — SIGNIFICANT CHANGE UP (ref 96–108)
CHOLEST SERPL-MCNC: 63 MG/DL — SIGNIFICANT CHANGE UP
CK MB CFR SERPL CALC: 2.4 NG/ML — SIGNIFICANT CHANGE UP (ref 0–3.8)
CK SERPL-CCNC: 76 U/L — SIGNIFICANT CHANGE UP (ref 25–170)
CO2 SERPL-SCNC: 21 MMOL/L — LOW (ref 22–31)
CREAT SERPL-MCNC: 0.84 MG/DL — SIGNIFICANT CHANGE UP (ref 0.5–1.3)
ESTIMATED AVERAGE GLUCOSE: 154 MG/DL — HIGH (ref 68–114)
GLUCOSE SERPL-MCNC: 127 MG/DL — HIGH (ref 70–99)
HCT VFR BLD CALC: 38.9 % — SIGNIFICANT CHANGE UP (ref 34.5–45)
HDLC SERPL-MCNC: 22 MG/DL — LOW
HGB BLD-MCNC: 12.2 G/DL — SIGNIFICANT CHANGE UP (ref 11.5–15.5)
LIPID PNL WITH DIRECT LDL SERPL: 23 MG/DL — SIGNIFICANT CHANGE UP
MAGNESIUM SERPL-MCNC: 1.6 MG/DL — SIGNIFICANT CHANGE UP (ref 1.6–2.6)
MCHC RBC-ENTMCNC: 25.1 PG — LOW (ref 27–34)
MCHC RBC-ENTMCNC: 31.4 GM/DL — LOW (ref 32–36)
MCV RBC AUTO: 79.9 FL — LOW (ref 80–100)
NON HDL CHOLESTEROL: 41 MG/DL — SIGNIFICANT CHANGE UP
NRBC # BLD: 0 /100 WBCS — SIGNIFICANT CHANGE UP (ref 0–0)
PHOSPHATE SERPL-MCNC: 2.5 MG/DL — SIGNIFICANT CHANGE UP (ref 2.5–4.5)
PLATELET # BLD AUTO: 296 K/UL — SIGNIFICANT CHANGE UP (ref 150–400)
POTASSIUM SERPL-MCNC: 3.9 MMOL/L — SIGNIFICANT CHANGE UP (ref 3.5–5.3)
POTASSIUM SERPL-SCNC: 3.9 MMOL/L — SIGNIFICANT CHANGE UP (ref 3.5–5.3)
RBC # BLD: 4.87 M/UL — SIGNIFICANT CHANGE UP (ref 3.8–5.2)
RBC # FLD: 14.4 % — SIGNIFICANT CHANGE UP (ref 10.3–14.5)
SODIUM SERPL-SCNC: 137 MMOL/L — SIGNIFICANT CHANGE UP (ref 135–145)
T3 SERPL-MCNC: 97 NG/DL — SIGNIFICANT CHANGE UP (ref 80–200)
T4 AB SER-ACNC: 4 UG/DL — LOW (ref 4.6–12)
TRIGL SERPL-MCNC: 88 MG/DL — SIGNIFICANT CHANGE UP
TROPONIN T, HIGH SENSITIVITY RESULT: 11 NG/L — SIGNIFICANT CHANGE UP (ref 0–51)
TROPONIN T, HIGH SENSITIVITY RESULT: 8 NG/L — SIGNIFICANT CHANGE UP (ref 0–51)
TROPONIN T, HIGH SENSITIVITY RESULT: 8 NG/L — SIGNIFICANT CHANGE UP (ref 0–51)
TSH SERPL-MCNC: 1.42 UIU/ML — SIGNIFICANT CHANGE UP (ref 0.27–4.2)
WBC # BLD: 9.52 K/UL — SIGNIFICANT CHANGE UP (ref 3.8–10.5)
WBC # FLD AUTO: 9.52 K/UL — SIGNIFICANT CHANGE UP (ref 3.8–10.5)

## 2020-10-27 PROCEDURE — 36223 PLACE CATH CAROTID/INOM ART: CPT | Mod: 59,RT

## 2020-10-27 PROCEDURE — 93010 ELECTROCARDIOGRAM REPORT: CPT

## 2020-10-27 PROCEDURE — 36226 PLACE CATH VERTEBRAL ART: CPT | Mod: LT

## 2020-10-27 PROCEDURE — 71045 X-RAY EXAM CHEST 1 VIEW: CPT | Mod: 26

## 2020-10-27 PROCEDURE — 99291 CRITICAL CARE FIRST HOUR: CPT

## 2020-10-27 PROCEDURE — 36620 INSERTION CATHETER ARTERY: CPT

## 2020-10-27 PROCEDURE — 99291 CRITICAL CARE FIRST HOUR: CPT | Mod: 25

## 2020-10-27 PROCEDURE — 37215 TRANSCATH STENT CCA W/EPS: CPT | Mod: LT

## 2020-10-27 RX ORDER — HYDROMORPHONE HYDROCHLORIDE 2 MG/ML
0.5 INJECTION INTRAMUSCULAR; INTRAVENOUS; SUBCUTANEOUS
Refills: 0 | Status: DISCONTINUED | OUTPATIENT
Start: 2020-10-27 | End: 2020-10-27

## 2020-10-27 RX ORDER — ONDANSETRON 8 MG/1
4 TABLET, FILM COATED ORAL ONCE
Refills: 0 | Status: DISCONTINUED | OUTPATIENT
Start: 2020-10-27 | End: 2020-10-29

## 2020-10-27 RX ORDER — ATORVASTATIN CALCIUM 80 MG/1
80 TABLET, FILM COATED ORAL AT BEDTIME
Refills: 0 | Status: DISCONTINUED | OUTPATIENT
Start: 2020-10-27 | End: 2020-10-27

## 2020-10-27 RX ORDER — SODIUM CHLORIDE 9 MG/ML
3 INJECTION INTRAMUSCULAR; INTRAVENOUS; SUBCUTANEOUS EVERY 8 HOURS
Refills: 0 | Status: DISCONTINUED | OUTPATIENT
Start: 2020-10-27 | End: 2020-10-28

## 2020-10-27 RX ORDER — SODIUM CHLORIDE 9 MG/ML
500 INJECTION INTRAMUSCULAR; INTRAVENOUS; SUBCUTANEOUS ONCE
Refills: 0 | Status: COMPLETED | OUTPATIENT
Start: 2020-10-27 | End: 2020-10-27

## 2020-10-27 RX ORDER — LEVETIRACETAM 250 MG/1
750 TABLET, FILM COATED ORAL
Refills: 0 | Status: DISCONTINUED | OUTPATIENT
Start: 2020-10-27 | End: 2020-10-27

## 2020-10-27 RX ORDER — CHOLECALCIFEROL (VITAMIN D3) 125 MCG
0 CAPSULE ORAL
Qty: 0 | Refills: 0 | DISCHARGE

## 2020-10-27 RX ORDER — ASPIRIN/CALCIUM CARB/MAGNESIUM 324 MG
325 TABLET ORAL DAILY
Refills: 0 | Status: DISCONTINUED | OUTPATIENT
Start: 2020-10-27 | End: 2020-10-29

## 2020-10-27 RX ORDER — AMLODIPINE BESYLATE 2.5 MG/1
1 TABLET ORAL
Qty: 0 | Refills: 0 | DISCHARGE

## 2020-10-27 RX ORDER — LISINOPRIL 2.5 MG/1
1 TABLET ORAL
Qty: 0 | Refills: 0 | DISCHARGE

## 2020-10-27 RX ORDER — ASPIRIN/CALCIUM CARB/MAGNESIUM 324 MG
0 TABLET ORAL
Qty: 0 | Refills: 0 | DISCHARGE

## 2020-10-27 RX ORDER — ATORVASTATIN CALCIUM 80 MG/1
80 TABLET, FILM COATED ORAL AT BEDTIME
Refills: 0 | Status: DISCONTINUED | OUTPATIENT
Start: 2020-10-27 | End: 2020-10-29

## 2020-10-27 RX ORDER — LEVETIRACETAM 250 MG/1
0 TABLET, FILM COATED ORAL
Qty: 0 | Refills: 0 | DISCHARGE

## 2020-10-27 RX ORDER — LEVETIRACETAM 250 MG/1
1000 TABLET, FILM COATED ORAL ONCE
Refills: 0 | Status: DISCONTINUED | OUTPATIENT
Start: 2020-10-27 | End: 2020-10-27

## 2020-10-27 RX ORDER — CHOLECALCIFEROL (VITAMIN D3) 125 MCG
2000 CAPSULE ORAL DAILY
Refills: 0 | Status: DISCONTINUED | OUTPATIENT
Start: 2020-10-27 | End: 2020-10-29

## 2020-10-27 RX ORDER — CLOPIDOGREL BISULFATE 75 MG/1
75 TABLET, FILM COATED ORAL DAILY
Refills: 0 | Status: DISCONTINUED | OUTPATIENT
Start: 2020-10-27 | End: 2020-10-29

## 2020-10-27 RX ORDER — NOREPINEPHRINE BITARTRATE/D5W 8 MG/250ML
0.05 PLASTIC BAG, INJECTION (ML) INTRAVENOUS
Qty: 16 | Refills: 0 | Status: DISCONTINUED | OUTPATIENT
Start: 2020-10-27 | End: 2020-10-29

## 2020-10-27 RX ORDER — DEXAMETHASONE 0.5 MG/5ML
8 ELIXIR ORAL ONCE
Refills: 0 | Status: DISCONTINUED | OUTPATIENT
Start: 2020-10-27 | End: 2020-10-27

## 2020-10-27 RX ORDER — SODIUM CHLORIDE 9 MG/ML
1000 INJECTION INTRAMUSCULAR; INTRAVENOUS; SUBCUTANEOUS
Refills: 0 | Status: DISCONTINUED | OUTPATIENT
Start: 2020-10-27 | End: 2020-10-27

## 2020-10-27 RX ORDER — LEVETIRACETAM 250 MG/1
750 TABLET, FILM COATED ORAL DAILY
Refills: 0 | Status: DISCONTINUED | OUTPATIENT
Start: 2020-10-27 | End: 2020-10-29

## 2020-10-27 RX ADMIN — ATORVASTATIN CALCIUM 80 MILLIGRAM(S): 80 TABLET, FILM COATED ORAL at 21:09

## 2020-10-27 RX ADMIN — Medication 5.53 MICROGRAM(S)/KG/MIN: at 11:39

## 2020-10-27 RX ADMIN — SODIUM CHLORIDE 1000 MILLILITER(S): 9 INJECTION INTRAMUSCULAR; INTRAVENOUS; SUBCUTANEOUS at 11:15

## 2020-10-27 RX ADMIN — SODIUM CHLORIDE 3 MILLILITER(S): 9 INJECTION INTRAMUSCULAR; INTRAVENOUS; SUBCUTANEOUS at 21:01

## 2020-10-27 RX ADMIN — LEVETIRACETAM 750 MILLIGRAM(S): 250 TABLET, FILM COATED ORAL at 17:38

## 2020-10-27 RX ADMIN — SODIUM CHLORIDE 75 MILLILITER(S): 9 INJECTION INTRAMUSCULAR; INTRAVENOUS; SUBCUTANEOUS at 12:00

## 2020-10-27 RX ADMIN — SODIUM CHLORIDE 3 MILLILITER(S): 9 INJECTION INTRAMUSCULAR; INTRAVENOUS; SUBCUTANEOUS at 17:14

## 2020-10-27 NOTE — PROGRESS NOTE ADULT - SUBJECTIVE AND OBJECTIVE BOX
INTERVAL HISTORY: HPI:  54 YEAR OLD FEMALE WITH PMH OF CAD, QUADRUPLE CABG 10/2019, CVA 6/2019 C/B SEIZURES, LEFT SIDED WEAKNESS, NIMESH, HTN, HLD,  BILATERAL CAROTID ARTERY DISEASE, PRIOR RIGHT CAROTID STENTING, LOWER EXTREMITY (LEFT) PVD WAS FOUND TO HAVE RIGHT CAROTID ARTERY STENT OCCLUSION ON CAROTID US. SHE NOW PRESENTS TO [facility] FOR SCREENING PRIOR TO CAROTID ARTERY ANGIOPLASTY AND STENTING.    ASPIRIN DOSE WAS INCREASED TO 325MG BY DR. WILLIAM 2 WEEKS AGO   COVID SWAB SCHEDULED FOR 10/24   (22 Oct 2020 07:11)      PRESSORS: [ ] YES [x ] NO      MEDICATIONS  (STANDING):  levETIRAcetam  IVPB 1000 milliGRAM(s) IV Intermittent once  sodium chloride 0.9% lock flush 3 milliLiter(s) IV Push every 8 hours    MEDICATIONS  (PRN):      Drug Dosing Weight  Height (cm): 172.7 (27 Oct 2020 09:01)  Weight (kg): 117.9 (27 Oct 2020 09:01)  BMI (kg/m2): 39.5 (27 Oct 2020 09:01)  BSA (m2): 2.28 (27 Oct 2020 09:01)    CENTRAL LINE: [ ] YES [x ] NO     BENITEZ: [ ] YES [x ] NO       A-LINE: [ ] YES [x ] NO      PAST MEDICAL & SURGICAL HISTORY:  NIMESH (obstructive sleep apnea)  used BIPAP only while hospitalized    CVA (cerebrovascular accident)  Ischemic Right MCA stroke    Atherosclerotic heart disease of native coronary artery without angina pectoris    Seizure    Hyperlipemia    Hypertension    Anxiety    S/P CABG x 4  10/2019    History of ischemic right MCA stroke  s/p SANDIE stent on 6/21/19        REVIEW OF SYSTEMS: [ ] Unable to Assess due to neurologic exam   [x ] All ROS addressed below are non-contributory, except:  Neuro: [ ] Headache [ ] Back pain [ ] Numbness [ ] Weakness [ ] Ataxia [ ] Dizziness [ ] Aphasia [ ] Dysarthria [ ] Visual disturbance  Resp: [ ] Shortness of breath/dyspnea, [ ] Orthopnea [ ] Cough  CV: [ ] Chest pain [ ] Palpitation [ ] Lightheadedness [ ] Syncope  Renal: [ ] Thirst [ ] Edema  GI: [ ] Nausea [ ] Emesis [ ] Abdominal pain [ ] Constipation [ ] Diarrhea  Hem: [ ] Hematemesis [ ] bright red blood per rectum  ID: [ ] Fever [ ] Chills [ ] Dysuria  ENT: [ ] Rhinorrhea      ICU Vital Signs Last 24 Hrs  T(C): 36.6 (27 Oct 2020 08:56), Max: 36.6 (27 Oct 2020 08:56)  T(F): --  HR: 90 (27 Oct 2020 09:01) (78 - 90)  BP: 132/62 (27 Oct 2020 09:01) (132/62 - 164/81)  BP(mean): --  ABP: --  ABP(mean): --  RR: 18 (27 Oct 2020 09:01) (18 - 18)  SpO2: 96% (27 Oct 2020 09:01) (96% - 97%)          I&O's Detail          PHYSICAL EXAM:    General: No Acute Distress     Neurological: Awake, alert oriented to person, place and time, Following Commands, PERRL, EOMI, Face Symmetrical, Speech Fluent, Moving all extremities, Muscle Strength normal in all four extremities, No Drift, Sensation to Light Touch Intact    Pulmonary: Clear to Auscultation, No Rales, No Rhonchi, No Wheezes     Cardiovascular: S1, S2, Regular Rate and Rhythm     Gastrointestinal: Soft, Nontender, Nondistended     Extremities: No calf tenderness     Incision:         LABS:    T(C): 36.6 (10-27-20 @ 08:56), Max: 36.6 (10-27-20 @ 08:56)  HR: 90 (10-27-20 @ 09:01) (78 - 90)  BP: 132/62 (10-27-20 @ 09:01) (132/62 - 164/81)  RR: 18 (10-27-20 @ 09:01) (18 - 18)  SpO2: 96% (10-27-20 @ 09:01) (96% - 97%)levETIRAcetam  IVPB 1000 milliGRAM(s) IV Intermittent once  sodium chloride 0.9% lock flush 3 milliLiter(s) IV Push every 8 hours              RADIOLOGY & ADDITIONAL STUDIES:

## 2020-10-27 NOTE — PROCEDURE NOTE - NSPROCDETAILS_GEN_ALL_CORE
hemostasis with direct pressure, dressing applied/ultrasound guidance/location identified, draped/prepped, sterile technique used, needle inserted/introduced/positive blood return obtained via catheter/sutured in place/connected to a pressurized flush line

## 2020-10-27 NOTE — CHART NOTE - NSCHARTNOTEFT_GEN_A_CORE
Interventional Neuro Radiology  Pre-Procedure Note    This is a 54y ____ hand dominant Female      HPI:  54 YEAR OLD FEMALE WITH PMH OF CAD, QUADRUPLE CABG 10/2019, CVA 6/2019 C/B SEIZURES, LEFT SIDED WEAKNESS, NIMESH, HTN, HLD,  BILATERAL CAROTID ARTERY DISEASE, PRIOR RIGHT CAROTID STENTING, LOWER EXTREMITY (LEFT) PVD  WAS FOUND TO HAVE RIGHT CAROTID ARTERY STENT OCCLUSION ON CAROTID US. SHE NOW PRESENTS TO [facility] FOR SCREENING PRIOR TO CAROTID ARTERY ANGIOPLASTY AND STENTING.    ASPIRIN DOSE WAS INCREASED TO 325MG BY DR. WILLIAM 2 WEEKS AGO   COVID SWAB SCHEDULED FOR 10/24   (22 Oct 2020 07:11)      Neuro Exam: Awake and alert, oriented x3, fluent, normal naming and repetition, follows 3 step commands. Extraocular movements intact, no nystagmus, visual fields full, face symmetric, tongue midline. No drift, 5/5 power x 4 extremities. Normal sensation to LT. Normal finger-to-nose and rapid alternating movements.    PAST MEDICAL & SURGICAL HISTORY:  NIMESH (obstructive sleep apnea)  used BIPAP only while hospitalized    CVA (cerebrovascular accident)  Ischemic Right MCA stroke    Atherosclerotic heart disease of native coronary artery without angina pectoris    Seizure    Hyperlipemia    Hypertension    Anxiety    S/P CABG x 4  10/2019    History of ischemic right MCA stroke  s/p SANDIE stent on 6/21/19        Social History:   Denies tobacco use    FAMILY HISTORY:  Family history of breast cancer  Mother    Family history of myocardial infarction  Father      No pertinent family history    Allergies: No Known Allergies      Current Medications: levETIRAcetam  IVPB 1000 milliGRAM(s) IV Intermittent once      Labs:                         13.7   9.24  )-----------( 326      ( 22 Oct 2020 07:54 )             44.0       10-22    134<L>  |  99  |  18  ----------------------------<  156<H>  4.0   |  25  |  0.97            HCG:     Blood Bank:     Assessment/Plan:   This is a 54y ____ hand dominant Female  presents with ______. Patient presents to neuro-IR for selective cerebral angiography. Procedure/ risks/ benefits/ goals/ alternatives were explained. Risks include but are not limited to stroke/ vessel injury/ hemorrhage/ groin hematoma. All questions answered. Informed content obtained from patient____. Consent placed in chart. Interventional Neuro Radiology  Pre-Procedure Note    This is a 53y/o Female with pmh of CAD, CABG x4 10/2019, CVA 6/2019 c/b seizures, left sided weakness, NIMESH, HTN, HLD, b/l carotid stenosis, previous right carotid angioplasty and stent placement, left le pvd, presents today for cerebral angiogram and left carotid stent placement      Neuro Exam: Awake and alert, oriented x3, fluent, normal naming and repetition, follows 3 step commands. Extraocular movements intact, no nystagmus, visual fields full, face symmetric, tongue midline. No drift, 5/5 power x 4 extremities. Normal sensation to LT. Normal finger-to-nose and rapid alternating movements.    PAST MEDICAL & SURGICAL HISTORY:  NIMESH (obstructive sleep apnea)  used BIPAP only while hospitalized    CVA (cerebrovascular accident)  Ischemic Right MCA stroke    Atherosclerotic heart disease of native coronary artery without angina pectoris    Seizure    Hyperlipemia    Hypertension    Anxiety    S/P CABG x 4  10/2019    History of ischemic right MCA stroke  s/p SANDIE stent on 6/21/19        Social History:   Denies tobacco use    FAMILY HISTORY:  Family history of breast cancer  Mother    Family history of myocardial infarction  Father      No pertinent family history    Allergies: No Known Allergies      Current Medications: levETIRAcetam  IVPB 1000 milliGRAM(s) IV Intermittent once      Labs:                         13.7   9.24  )-----------( 326      ( 22 Oct 2020 07:54 )             44.0       10-22    134<L>  |  99  |  18  ----------------------------<  156<H>  4.0   |  25  |  0.97            HCG: negative    Blood Bank: Blood Available    Assessment/Plan:   This is a 53y/o Female who presents with left ICA stenosis. Patient presents to neuro-IR for selective cerebral angiography and left carotid stent placement. Procedure/ risks/ benefits/ goals/ alternatives were explained. Risks include but are not limited to stroke/ vessel injury/ hemorrhage/ groin hematoma. All questions answered. Informed content obtained from patient. Consent placed in chart.    Em Wong PA-C

## 2020-10-27 NOTE — CHART NOTE - NSCHARTNOTEFT_GEN_A_CORE
Interventional Neuro- Radiology   Procedure Note      Procedure: Selective Cerebral Angiography   Pre- Procedure Diagnosis:  Post- Procedure Diagnosis:    : Dr. Denis MD    Physician Assistant: Em Wong PA-C    RN:  Tech:    Anesthesia: (MAC)   (general anesthesia)    I/Os:  Fluids:  Nichole:  Contrast:  Estimated Blood Loss: <10cc    Preliminary Report:  Under MAC/ general anesthesia, using a ___Fr short/long sheath to the right/ left/ bilateral groin examination of left vertebral artery/ left internal carotid artery/ left external carotid artey/ right vertebral artery/ right internal carotid artery/ right external carotid artery via selective cerebral angiography demonstrates ________. ( Official note to follow).    Patient tolerated procedure well, vital signs stable, hemodynamically stable, no change in neurological status compared to baseline. Results discussed with neurosurgery/ patient and their family. Groin sheath d/c'ed, manual compression held to hemostasis, no active bleeding, no hematoma, star-close device applied, quick clot and safeguard balloon dressing applied at _____h. STAT labs, CBC/BMP/PTT at ____h. Patient transferred to PACU/ NSCU/ IR recovery for further care/ monitoring. Interventional Neuro- Radiology   Procedure Note      Procedure: Selective Cerebral Angiography   Pre- Procedure Diagnosis: L ICA stenosis  Post- Procedure Diagnosis: L ICA stenosis s/p carotid stent placement    : Dr. Denis MD  Residents: Dr Donovan Ramirez Dr   Physician Assistant: Em Wong PA-C    RN: Brandie Galvez: Corey    Anesthesia: Deandre Slaughter CRNA (MAC)    Fluids: 300 cc DTV  Contrast: 68 cc  Estimated Blood Loss: <10cc    Preliminary Report:  Under MAC, using a 6Fr neuronmax 80 sheath to the right groin examination of left vertebral artery/ left internal carotid artery/ left external carotid artery/ right vertebral artery/ right internal carotid artery/ right external carotid artery via selective cerebral angiography demonstrates ________. ( Official note to follow).    Patient tolerated procedure well, vital signs stable, hemodynamically stable, no change in neurological status compared to baseline. Results discussed with neurosurgery/ patient and their family. Groin sheath d/c'ed, manual compression held to hemostasis, no active bleeding, no hematoma, star-close device applied, quick clot and safeguard balloon dressing applied at 10:30h. Patient transferred to NSCU for further care/ monitoring.    Em Wong PA-C Interventional Neuro- Radiology   Procedure Note      Procedure: Selective Cerebral Angiography   Pre- Procedure Diagnosis: L ICA stenosis  Post- Procedure Diagnosis: L ICA stenosis s/p successful angioplasty and carotid stent placement    : Dr. Denis MD  Residents: Dr Donovan Ramirez Dr   Physician Assistant: mE Wong PA-C    RN: Brandie  Tech: Corey    Anesthesia: Deandre Slaughter CRNA (MAC)    Fluids: 300 cc DTV  Contrast: 68 cc  Estimated Blood Loss: <10cc    Preliminary Report:  Under MAC, using a 6Fr neuronmax 80 sheath to the right groin examination of left vertebral artery/ left internal carotid artery/ left external carotid artery/ right vertebral artery/ right internal carotid artery/ right external carotid artery via selective cerebral angiography demonstrates L ICA stenosis. ( Official note to follow).    Patient tolerated procedure well, vital signs stable, hemodynamically stable, no change in neurological status compared to baseline. Results discussed with neurosurgery/ patient and their family. Groin sheath d/c'ed, manual compression held to hemostasis, no active bleeding, no hematoma, star-close device applied, quick clot and safeguard balloon dressing applied at 10:30h. Patient transferred to NSCU for further care/ monitoring.    Em Wong PA-C

## 2020-10-27 NOTE — DISCHARGE NOTE NURSING/CASE MANAGEMENT/SOCIAL WORK - PATIENT PORTAL LINK FT
You can access the FollowMyHealth Patient Portal offered by [facility] by registering at the following website: [email or web address]. By joining [facility]’s FollowMyHealth portal, you will also be able to view your health information using other applications (apps) compatible with our system.

## 2020-10-27 NOTE — PROGRESS NOTE ADULT - SUBJECTIVE AND OBJECTIVE BOX
HPI: Pt is a 55 y/o F with PMH of CAD, Quadruple CABG 10/2019, CVA 6/2019- with residual L sided weakness, complicated by seizures.  Additional history include NIMESH, HTN, HLD, peripheral vascular disease, B/L carotid artery disease.   Of note, pt had prior R carotid stent. [facility]. Intraop course complicated by hypotension      On admission, the patient was:  GCS: 15  Hunt-Palmer:  modified Go:  ICH score:  NIHSS:      ICU Vital Signs Last 24 Hrs  T(C): 36.6 (27 Oct 2020 08:56), Max: 36.6 (27 Oct 2020 08:56)  HR: 90 (27 Oct 2020 09:01) (78 - 90)  BP: 132/62 (27 Oct 2020 09:01) (132/62 - 164/81)  RR: 18 (27 Oct 2020 09:01) (18 - 18)  SpO2: 96% (27 Oct 2020 09:01) (96% - 97%)    levETIRAcetam  IVPB 1000 milliGRAM(s) IV Intermittent once  sodium chloride 0.9% lock flush 3 milliLiter(s) IV Push every 8 hours    Labs: pending    EXAMINATION:  General:  calm  HEENT:  MMM  Neuro:  Oriented x3, PERRLA, EOMI, RICHARDS 5/5 (RLE deferred re: safeguard). No carotid bruit.   Cards:  RRR  Respiratory:  no respiratory distress  Abdomen:  soft  Extremities:  no edema  Skin:  warm/dry HPI: Pt is a 55 y/o F with PMH of CAD, Quadruple CABG 10/2019, CVA 6/2019- with residual L sided weakness, complicated by seizures.  Additional history include NIMESH, HTN, HLD, peripheral vascular disease, B/L carotid artery disease.   Of note, pt had prior R carotid stent. Now transferred to NSCU s/p L carotid stent. Intraop course complicated by hypotension    Evnts: arrive to the NSCU on phenylephrine with SBP in the 80s    On admission, the patient was:  GCS: 15  Hunt-Palmer:  modified Go:  ICH score:  NIHSS:      ICU Vital Signs Last 24 Hrs  T(C): 36.6 (27 Oct 2020 08:56), Max: 36.6 (27 Oct 2020 08:56)  HR: 90 (27 Oct 2020 09:01) (78 - 90)  BP: 132/62 (27 Oct 2020 09:01) (132/62 - 164/81)  RR: 18 (27 Oct 2020 09:01) (18 - 18)  SpO2: 96% (27 Oct 2020 09:01) (96% - 97%)    levETIRAcetam  IVPB 1000 milliGRAM(s) IV Intermittent once  sodium chloride 0.9% lock flush 3 milliLiter(s) IV Push every 8 hours    Labs: pending    EXAMINATION:  General:  calm  HEENT:  MMM  Neuro:  Oriented x3, PERRLA, EOMI, RICHARDS 5/5 (RLE deferred re: safeguard). No carotid bruit.   Cards:  RRR  Respiratory:  no respiratory distress  Abdomen:  soft  Extremities:  no edema  Skin:  warm/dry

## 2020-10-27 NOTE — DISCHARGE NOTE NURSING/CASE MANAGEMENT/SOCIAL WORK - NSDCPEPTSTRK_GEN_ALL_CORE
Signs and symptoms of stroke/Prescribed medications/Risk factors for stroke/Need for follow up after discharge/Stroke education booklet/Call 911 for stroke/Stroke support groups for patients, families, and friends/Stroke warning signs and symptoms

## 2020-10-27 NOTE — CHART NOTE - NSCHARTNOTEFT_GEN_A_CORE
CAPRINI SCORE [CLOT] Score on Admission for     AGE RELATED RISK FACTORS                                                       MOBILITY RELATED FACTORS  [x] Age 41-60 years                                            (1 Point)                  [ ] Bed rest                                                        (1 Point)  [ ] Age: 61-74 years                                           (2 Points)                [ ] Plaster cast                                                   (2 Points)  [ ] Age= 75 years                                              (3 Points)                  [ ] Bed bound for more than 72 hours         (2 Points)    DISEASE RELATED RISK FACTORS                                               GENDER SPECIFIC FACTORS  [ ] Edema in the lower extremities                       (1 Point)           [ ] Pregnancy                                                          (1 Point)  [ ] Varicose veins                                               (1 Point)                  [ ] Post-partum < 6 weeks                                   (1 Point)             [X] BMI > 25 Kg/m2                                            (1 Point)                  [ ] Hormonal therapy  or oral contraception   (1 Point)                 [ ] Sepsis (in the previous month)                        (1 Point)            [ ] History of pregnancy complications             (1 point)  [ ] Pneumonia or serious lung disease                                            [ ] Unexplained or recurrent               (1 Point)           (in the previous month)                               (1 Point)  [ ] Abnormal pulmonary function test                     (1 Point)                 SURGERY RELATED RISK FACTORS (include planned surgeries)  [ ] Acute myocardial infarction                              (1 Point)                 [ ]  Section                                             (1 Point)  [ ] Congestive heart failure (in the previous month)  (1 Point)        [ ] Minor surgery                                                  (1 Point)   [ ] Inflammatory bowel disease                             (1 Point)                 [ ] Arthroscopic surgery                                        (2 Points)  [ ] Central venous access                                      (2 Points)  [ ] History / presence of malignancy                   (2 points)   [X] General surgery lasting more than 45 minutes   (2 Points)       [ ] Stroke (in the previous month)                          (5 Points)               [ ] Elective arthroplasty                                         (5 Points)                                                                                                                                               HEMATOLOGY RELATED FACTORS                                                 TRAUMA RELATED RISK FACTORS  [ ] Prior episodes of VTE                                     (3 Points)                [ ] Fracture of the hip, pelvis, or leg                       (5 Points)  [ ] Positive family history for VTE                         (3 Points)             [ ] Acute spinal cord injury (in the previous month)  (5 Points)  [ ] Prothrombin 11817 A                                     (3 Points)                [ ] Paralysis  (less than 1 month)                             (5 Points)  [ ] Factor V Leiden                                             (3 Points)                   [ ] Multiple Trauma within 1 month                        (5 Points)  [ ] Lupus anticoagulants                                     (3 Points)                                                           [ ] Anticardiolipin antibodies                               (3 Points)                                                       [ ] High homocysteine in the blood                      (3 Points)                                             [ ] Other congenital or acquired thrombophilia      (3 Points)                                                [ ] Heparin induced thrombocytopenia                  (3 Points)                                          Total Score [      4    ]    Risk:  Very low 0   Low 1 to 2   Moderate 3 to 4   High =5       VTE Prophylaxis Recommendations:  [X] mechanical pneumatic compression devices                                      [ ] contraindicated: _____________________  [ ] chemoprophylaxis                                                                                    [X] contraindicated ____fresh post op_________________    **** HIGH LIKELIHOOD DVT PRESENT ON ADMISSION  [X] (please order LE dopplers within 24 hours of admission)

## 2020-10-27 NOTE — PROGRESS NOTE ADULT - SUBJECTIVE AND OBJECTIVE BOX
Underwent angio with left carotid stent placement. Post-op with relative hypotension requiring pressor support.     Awake, alert, fully oriented, follows, EOMI, face symmetric, no drift, full strength, no groin hematoma, +distal pulses

## 2020-10-27 NOTE — PROGRESS NOTE ADULT - ASSESSMENT
ASSESSMENT/PLAN:  S/P L carotid stent    NEURO:  Q1 neurochecks  Analgesics  Carotid US  Activity: [] mobilize as tolerated [x] Bedrest for now [] PT [] OT [] PMNR    PULM: History of NIMESH  Keep sats >92%  Incentive spirometry    CV: History of CAD S/P CABG  SBP goal <140    RENAL:  Fluids: IVF     GI:  Diet: Dysphagia and advance as tolerated  GI prophylaxis [] not indicated [] PPI [] other:  Bowel regimen [] colace [] senna [] other:    ENDO:   Goal euglycemia (-180)    HEME/ONC:  VTE prophylaxis: [] SCDs [] chemoprophylaxis [x] hold chemoprophylaxis due to: fresh post op     ID:  Perioperative antibiotics    MISC:    SOCIAL/FAMILY:  [x] awaiting [] updated at bedside [] family meeting    CODE STATUS:  [x] Full Code [] DNR [] DNI [] Palliative/Comfort Care    DISPOSITION:  [x] ICU [] Stroke Unit [] Floor [] EMU [] RCU [] PCU    [x] Patient is at high risk of neurologic deterioration/death due to: stroke, hemorrhage, hyperperfusion.      Time spent: 40 critical care minutes    Contact: [phone] ASSESSMENT/PLAN:  S/P L carotid stent, previous right carotid stenting     NEURO:  Q1 neurochecks  Analgesics  Carotid US  monitor for reperfusion injury   aspirin 325 mg  and plavix 75 mg  for carotis artery disease  resume atorvastatin   continue her home keppra, ? history of seizures   resume clonazepam tonight   Activity: [] mobilize as tolerated [x] Bedrest for now [] PT [] OT [] PMNR    PULM: History of NIMESH  Keep sats >92%, NC   chest xray   Incentive spirometry    CV: History of CAD S/P CABG  hold anti-HTN   pro-BNP   hypotensive and bradycardic possible from the carotid manipulation/neurogenic   r/o cardiogenic cause will get ECG, troponin   r/o bleeding, will get cbc  insert a line  change phenylephrine to NE given  bradycardia  keep atropine at bedside   TTE   SBP goal 100- 140, MAP> 65 mmhg     RENAL:  Fluids:  ml bolus  NS 75 ml/hr   hyponatremia, likely SIADH, will monitor     GI:  Diet: NPO for now, can start feeds tonight if hemodynamically more stable   GI prophylaxis [x] not indicated [] PPI [] other:  Bowel regimen [] colace [x] senna [] other:    ENDO:   Goal euglycemia (-180)  TSH pending     HEME/ONC:  VTE prophylaxis: [] SCDs [] chemoprophylaxis [x] hold chemoprophylaxis due to: fresh post op     ID:  Perioperative antibiotics    MISC:    SOCIAL/FAMILY:  [x] awaiting [] updated at bedside [] family meeting    CODE STATUS:  [x] Full Code [] DNR [] DNI [] Palliative/Comfort Care    DISPOSITION:  [x] ICU [] Stroke Unit [] Floor [] EMU [] RCU [] PCU    [x] Patient is at high risk of neurologic deterioration/death and is HD unstable  due to: stroke, hemorrhage, hyperperfusion.      Time spent: 40 critical care minutes    Contact: [phone]

## 2020-10-27 NOTE — PROGRESS NOTE ADULT - ASSESSMENT
Left carotid stenosis status post left AAYUSH, post-operative day 0  -140mmHg, MAP >65mmHg   Left carotid stenosis status post left AAYUSH, post-operative day 0  -140mmHg, MAP >65mmHg, pressor as needed; hypotension possibly from baroreceptor stretching  ASA/clopidogrel for stent  Carotid u/s in AM  R/o other causes of hypotension: TTE pending, f/u labs  Monitor groin

## 2020-10-28 ENCOUNTER — RX CHANGE (OUTPATIENT)
Age: 55
End: 2020-10-28

## 2020-10-28 DIAGNOSIS — I63.50 CEREBRAL INFARCTION DUE TO UNSPECIFIED OCCLUSION OR STENOSIS OF UNSPECIFIED CEREBRAL ARTERY: ICD-10-CM

## 2020-10-28 DIAGNOSIS — I10 ESSENTIAL (PRIMARY) HYPERTENSION: ICD-10-CM

## 2020-10-28 DIAGNOSIS — I25.10 ATHEROSCLEROTIC HEART DISEASE OF NATIVE CORONARY ARTERY WITHOUT ANGINA PECTORIS: ICD-10-CM

## 2020-10-28 LAB
ANION GAP SERPL CALC-SCNC: 10 MMOL/L — SIGNIFICANT CHANGE UP (ref 5–17)
BUN SERPL-MCNC: 17 MG/DL — SIGNIFICANT CHANGE UP (ref 7–23)
CALCIUM SERPL-MCNC: 8.5 MG/DL — SIGNIFICANT CHANGE UP (ref 8.4–10.5)
CHLORIDE SERPL-SCNC: 106 MMOL/L — SIGNIFICANT CHANGE UP (ref 96–108)
CHOLEST SERPL-MCNC: 122 MG/DL
CHOLEST/HDLC SERPL: 3.2 RATIO
CO2 SERPL-SCNC: 21 MMOL/L — LOW (ref 22–31)
CREAT SERPL-MCNC: 0.9 MG/DL — SIGNIFICANT CHANGE UP (ref 0.5–1.3)
GLUCOSE SERPL-MCNC: 142 MG/DL — HIGH (ref 70–99)
HCT VFR BLD CALC: 38.7 % — SIGNIFICANT CHANGE UP (ref 34.5–45)
HDLC SERPL-MCNC: 38 MG/DL
HGB BLD-MCNC: 12 G/DL — SIGNIFICANT CHANGE UP (ref 11.5–15.5)
LDLC SERPL CALC-MCNC: 61 MG/DL
MAGNESIUM SERPL-MCNC: 1.7 MG/DL — SIGNIFICANT CHANGE UP (ref 1.6–2.6)
MCHC RBC-ENTMCNC: 24.9 PG — LOW (ref 27–34)
MCHC RBC-ENTMCNC: 31 GM/DL — LOW (ref 32–36)
MCV RBC AUTO: 80.5 FL — SIGNIFICANT CHANGE UP (ref 80–100)
NRBC # BLD: 0 /100 WBCS — SIGNIFICANT CHANGE UP (ref 0–0)
PHOSPHATE SERPL-MCNC: 2.9 MG/DL — SIGNIFICANT CHANGE UP (ref 2.5–4.5)
PLATELET # BLD AUTO: 331 K/UL — SIGNIFICANT CHANGE UP (ref 150–400)
POTASSIUM SERPL-MCNC: 3.6 MMOL/L — SIGNIFICANT CHANGE UP (ref 3.5–5.3)
POTASSIUM SERPL-SCNC: 3.6 MMOL/L — SIGNIFICANT CHANGE UP (ref 3.5–5.3)
RBC # BLD: 4.81 M/UL — SIGNIFICANT CHANGE UP (ref 3.8–5.2)
RBC # FLD: 14.6 % — HIGH (ref 10.3–14.5)
SODIUM SERPL-SCNC: 137 MMOL/L — SIGNIFICANT CHANGE UP (ref 135–145)
TRIGL SERPL-MCNC: 113 MG/DL
WBC # BLD: 10.85 K/UL — HIGH (ref 3.8–10.5)
WBC # FLD AUTO: 10.85 K/UL — HIGH (ref 3.8–10.5)

## 2020-10-28 PROCEDURE — 99291 CRITICAL CARE FIRST HOUR: CPT

## 2020-10-28 PROCEDURE — 99223 1ST HOSP IP/OBS HIGH 75: CPT

## 2020-10-28 PROCEDURE — 93010 ELECTROCARDIOGRAM REPORT: CPT

## 2020-10-28 PROCEDURE — 93880 EXTRACRANIAL BILAT STUDY: CPT | Mod: 26

## 2020-10-28 PROCEDURE — 93970 EXTREMITY STUDY: CPT | Mod: 26

## 2020-10-28 PROCEDURE — 93306 TTE W/DOPPLER COMPLETE: CPT | Mod: 26

## 2020-10-28 RX ORDER — SODIUM CHLORIDE 9 MG/ML
500 INJECTION INTRAMUSCULAR; INTRAVENOUS; SUBCUTANEOUS ONCE
Refills: 0 | Status: DISCONTINUED | OUTPATIENT
Start: 2020-10-28 | End: 2020-10-28

## 2020-10-28 RX ORDER — POLYETHYLENE GLYCOL 3350 17 G/17G
17 POWDER, FOR SOLUTION ORAL
Refills: 0 | Status: DISCONTINUED | OUTPATIENT
Start: 2020-10-28 | End: 2020-10-29

## 2020-10-28 RX ORDER — INSULIN LISPRO 100/ML
VIAL (ML) SUBCUTANEOUS
Refills: 0 | Status: DISCONTINUED | OUTPATIENT
Start: 2020-10-28 | End: 2020-10-29

## 2020-10-28 RX ORDER — ENOXAPARIN SODIUM 100 MG/ML
40 INJECTION SUBCUTANEOUS
Refills: 0 | Status: DISCONTINUED | OUTPATIENT
Start: 2020-10-28 | End: 2020-10-29

## 2020-10-28 RX ORDER — POTASSIUM CHLORIDE 20 MEQ
40 PACKET (EA) ORAL ONCE
Refills: 0 | Status: COMPLETED | OUTPATIENT
Start: 2020-10-28 | End: 2020-10-28

## 2020-10-28 RX ORDER — SODIUM CHLORIDE 9 MG/ML
500 INJECTION INTRAMUSCULAR; INTRAVENOUS; SUBCUTANEOUS ONCE
Refills: 0 | Status: COMPLETED | OUTPATIENT
Start: 2020-10-28 | End: 2020-10-28

## 2020-10-28 RX ORDER — MAGNESIUM SULFATE 500 MG/ML
1 VIAL (ML) INJECTION ONCE
Refills: 0 | Status: COMPLETED | OUTPATIENT
Start: 2020-10-28 | End: 2020-10-28

## 2020-10-28 RX ORDER — CHLORHEXIDINE GLUCONATE 213 G/1000ML
1 SOLUTION TOPICAL
Refills: 0 | Status: DISCONTINUED | OUTPATIENT
Start: 2020-10-28 | End: 2020-10-29

## 2020-10-28 RX ORDER — SODIUM CHLORIDE 9 MG/ML
1000 INJECTION INTRAMUSCULAR; INTRAVENOUS; SUBCUTANEOUS
Refills: 0 | Status: DISCONTINUED | OUTPATIENT
Start: 2020-10-28 | End: 2020-10-29

## 2020-10-28 RX ORDER — SENNA PLUS 8.6 MG/1
2 TABLET ORAL AT BEDTIME
Refills: 0 | Status: DISCONTINUED | OUTPATIENT
Start: 2020-10-28 | End: 2020-10-29

## 2020-10-28 RX ADMIN — CLOPIDOGREL BISULFATE 75 MILLIGRAM(S): 75 TABLET, FILM COATED ORAL at 11:19

## 2020-10-28 RX ADMIN — LEVETIRACETAM 750 MILLIGRAM(S): 250 TABLET, FILM COATED ORAL at 11:19

## 2020-10-28 RX ADMIN — SODIUM CHLORIDE 50 MILLILITER(S): 9 INJECTION INTRAMUSCULAR; INTRAVENOUS; SUBCUTANEOUS at 11:18

## 2020-10-28 RX ADMIN — CHLORHEXIDINE GLUCONATE 1 APPLICATION(S): 213 SOLUTION TOPICAL at 21:15

## 2020-10-28 RX ADMIN — Medication 2000 UNIT(S): at 11:19

## 2020-10-28 RX ADMIN — Medication 325 MILLIGRAM(S): at 11:19

## 2020-10-28 RX ADMIN — SODIUM CHLORIDE 50 MILLILITER(S): 9 INJECTION INTRAMUSCULAR; INTRAVENOUS; SUBCUTANEOUS at 20:00

## 2020-10-28 RX ADMIN — Medication 40 MILLIEQUIVALENT(S): at 02:05

## 2020-10-28 RX ADMIN — ENOXAPARIN SODIUM 40 MILLIGRAM(S): 100 INJECTION SUBCUTANEOUS at 17:07

## 2020-10-28 RX ADMIN — Medication 100 GRAM(S): at 02:05

## 2020-10-28 RX ADMIN — ATORVASTATIN CALCIUM 80 MILLIGRAM(S): 80 TABLET, FILM COATED ORAL at 21:15

## 2020-10-28 RX ADMIN — Medication 5.53 MICROGRAM(S)/KG/MIN: at 20:00

## 2020-10-28 RX ADMIN — SODIUM CHLORIDE 250 MILLILITER(S): 9 INJECTION INTRAMUSCULAR; INTRAVENOUS; SUBCUTANEOUS at 15:35

## 2020-10-28 NOTE — CONSULT NOTE ADULT - SUBJECTIVE AND OBJECTIVE BOX
Cardiology Consult  Faculty Cardiology  ==========================================================================    Chief Complaint:     HPI:  54 YEAR OLD FEMALE WITH PMH OF CAD, QUADRUPLE CABG 10/2019, CVA 2019 C/B SEIZURES, LEFT SIDED WEAKNESS, NIMESH, HTN, HLD,  BILATERAL CAROTID ARTERY DISEASE, PRIOR RIGHT CAROTID STENTING, LOWER EXTREMITY (LEFT) PVD  WAS FOUND TO HAVE RIGHT CAROTID ARTERY STENT OCCLUSION ON CAROTID US. SHE NOW PRESENTS TO [facility] FOR SCREENING PRIOR TO CAROTID ARTERY ANGIOPLASTY AND STENTING.    ASPIRIN DOSE WAS INCREASED TO 325MG BY DR. WILLIAM 2 WEEKS AGO   COVID SWAB SCHEDULED FOR 10/24   (22 Oct 2020 07:11)  History as stated above. 54 F with prior stroke 2019. She had right carotid stenting at that time. At the time of the stroke patient was noted to have severe LV dysfunction. Cath showed severe multivessel disease. Patient medically managed and recovered from stroke. She had CABG 10/2019. After revascularization she had recovery of her LV function. She has denies chest pain or SOB. She was noted on carotid imaging that there was possible restenosis of the right carotid stent and also significant Left carotid stenosis. Patient had denies symptoms. She has HTN which is medically managed and HLD on statin therapy. She had successful stenting of the left carotid artery yesterday but post-op was noted to have bradycardia and hypotension. Patient now in [facility] on levophed. She denies any symptoms related and overall feels well. Echo was being done at time of the visit and showed grossly normal LV function.   PMH:   NIMESH (obstructive sleep apnea)    CAD (coronary artery disease)    Anxiety    CVA (cerebrovascular accident)    Atherosclerotic heart disease of native coronary artery without angina pectoris    Seizure    Hyperlipemia    Hypertension    Anxiety    No pertinent past medical history      PSH:   S/P CABG x 4    History of ischemic right MCA stroke    No significant past surgical history      Medications:   aspirin 325 milliGRAM(s) Oral daily  atorvastatin 80 milliGRAM(s) Oral at bedtime  chlorhexidine 4% Liquid 1 Application(s) Topical <User Schedule>  cholecalciferol 2000 Unit(s) Oral daily  clopidogrel Tablet 75 milliGRAM(s) Oral daily  levETIRAcetam 750 milliGRAM(s) Oral daily  norepinephrine Infusion 0.05 MICROgram(s)/kG/Min IV Continuous <Continuous>  ondansetron Injectable 4 milliGRAM(s) IV Push once PRN    Allergies:  No Known Allergies    FAMILY HISTORY:  Family history of breast cancer  Mother    Family history of myocardial infarction  Father      Social History:  Smoking: No active  Alcohol:  Drugs:    Review of Systems:  Constitutional: [ ] Fever [ ] Chills [ ] Fatigue [ ] Weight change   HEENT: [ ] Blurred vision [ ] Eye Pain [ ] Headache [ ] Runny nose [ ] Sore Throat   Respiratory: [ ] Cough [ ] Wheezing [ ] Shortness of breath  Cardiovascular: [ ] Chest Pain [ ] Palpitations [ ] GILMORE [ ] PND [ ] Orthopnea  Gastrointestinal: [ ] Abdominal Pain [ ] Diarrhea [ ] Constipation [ ] Hemorrhoids [ ] Nausea [ ] Vomiting  Genitourinary: [ ] Nocturia [ ] Dysuria [ ] Incontinence  Extremities: [ ] Swelling [ ] Joint Pain  Neurologic: [ ] Focal deficit [ ] Paresthesias [ ] Syncope  Lymphatic: [ ] Swelling [ ] Lymphadenopathy   Skin: [ ] Rash [ ] Ecchymoses [ ] Wounds [ ] Lesions  Psychiatry: [ ] Depression [ ] Suicidal/Homicidal Ideation [ ] Anxiety [ ] Sleep Disturbances  [X ] 10 point review of systems is otherwise negative except as mentioned above            [ ]Unable to obtain    Physical Exam:  T(C): 36.7 (10-28-20 @ 07:00), Max: 37 (10-27-20 @ 19:00)  HR: 63 (10-28-20 @ 08:15) (45 - 105)  BP: 101/58 (10-27-20 @ 11:45) (66/37 - 137/75)  RR: 12 (10-28-20 @ 08:15) (7 - 21)  SpO2: 98% (10-28-20 @ 08:15) (91% - 100%)  Wt(kg): --    10-27 @ 07:01  -  10-28 @ 07:00  --------------------------------------------------------  IN: 2420.9 mL / OUT: 2200 mL / NET: 220.9 mL    10-28 @ 07:01  -  10-28 @ 09:17  --------------------------------------------------------  IN: 203.3 mL / OUT: 0 mL / NET: 203.3 mL      Daily     Daily     Appearance: [x ] Normal [x ] NAD  Eyes: [x ] PERRL [x ] EOMI  HENT: [x ] Normal oral mus[place]a [x ]NC/AT  Neck: [x] Supple [x] FROM [x] No JVD  Cardiovascular: [x ] S1, S2 [x ] RRR [x ] No m/r/g [x ]No edema  Respiratory: [x ] Clear to auscultation bilaterally [x ] Normal Effort  Gastrointestinal: [x ] Soft [x ] Non-tender [x ] Non-distended [x ] BS+  Musculoskeletal: [x ] No clubbing [x] No joint deformity   Neurologic: [x ] Non-focal  Lymphatic: [x ] No lymphadenopathy  Psychiatry: [x ] AAOx3 [x ] Mood & affect appropriate  Skin: [ x] No rashes [ x] No ecchymoses [x ] No cyanosis [x ] warm and dry    Procedural Access Site: [ ] No hematoma [ ] Non-tender to palpation [ ] 2+ pulse [ ] No bruit [ ] No Ecchymosis    Cardiovascular Diagnostic Testing:  ECG: pending    Echo: Prelim - grossly normal LV function.     Stress Testing:    Cath:    Interpretation of Telemetry: Sinus bradycardia.     Imaging:    Labs:                        12.0   10.85 )-----------( 331      ( 28 Oct 2020 00:33 )             38.7     10-28    137  |  106  |  17  ----------------------------<  142<H>  3.6   |  21<L>  |  0.90    Ca    8.5      28 Oct 2020 00:33  Phos  2.9     10-28  Mg     1.7     10-28        CARDIAC MARKERS ( 27 Oct 2020 12:58 )  x     / x     / 76 U/L / x     / 2.4 ng/mL      Serum Pro-Brain Natriuretic Peptide: 123 pg/mL (10-27 @ 12:58)    Total Cholesterol: 63  LDL: 23  HDL: 22  T      Thyroid Stimulating Hormone, Serum: 1.42 uIU/mL (10-27 @ 15:41)

## 2020-10-28 NOTE — PROGRESS NOTE ADULT - ASSESSMENT
54F s/p L carotid stent  - post procedure hypoTN, improved  - continue ASA/plavix  - carotid U/S in am

## 2020-10-28 NOTE — PHYSICAL THERAPY INITIAL EVALUATION ADULT - LIVES WITH, PROFILE
children/spouse children/spouse/Pt resides in private home with spouse and 2 children, 10-15 steps to enter and 1 FOS to negotiate inside the home. Prior to admit, pt reports being functionally independent, ambulating without an AD, performing ADLs without assist. (+) driving

## 2020-10-28 NOTE — CONSULT NOTE ADULT - PROBLEM SELECTOR RECOMMENDATION 3
Currently patient is hypotensive on levophed and with transient bradycardia. She has no related symptoms. This is likely related to carotid stimulation and should resolve. Given extent of vascular disease would try to avoid midodrine and see if levophed can be weaned throughout the course of the day.

## 2020-10-28 NOTE — CONSULT NOTE ADULT - PROBLEM SELECTOR RECOMMENDATION 9
CAD is stable. No evidence of ischemia. No SOB NO CP. She is on high dose statin. LDL is at goal but HDL is in the 20's. Continue statin for now.  Echo shows normal LV function.

## 2020-10-28 NOTE — CONSULT NOTE ADULT - PROBLEM SELECTOR RECOMMENDATION 2
This is old CVA complicated by residual sz activity. None recently per patient.   She is now s/p Left carotid stenting and denies neuro symptoms.

## 2020-10-28 NOTE — PHYSICAL THERAPY INITIAL EVALUATION ADULT - GENERAL OBSERVATIONS, REHAB EVAL
Pt received Pt received semi-supine in bed in NAD, +IVF, +A-line, +tele, +cont pulse ox, VSS agreeable to participate in therapy at this time.

## 2020-10-28 NOTE — PHYSICAL THERAPY INITIAL EVALUATION ADULT - ADDITIONAL COMMENTS
Chest X-Ray 10/27/20 Impression: The heart is slightly enlarged. The lungs are clear. No pleural effusion. No pneumothorax. Status post sternotomy. No acute bony pathology could be identified.

## 2020-10-28 NOTE — PROGRESS NOTE ADULT - SUBJECTIVE AND OBJECTIVE BOX
HPI: Pt is a 55 y/o F with PMH of CAD, Quadruple CABG 10/2019, CVA 6/2019- with residual L sided weakness, complicated by seizures.  Additional history include NIMESH, HTN, HLD, peripheral vascular disease, B/L carotid artery disease.   Of note, pt had prior R carotid stent. Now transferred to NSCU s/p L carotid stent. Intraop course complicated by hypotension    Evnts:       EXAMINATION:  General:  calm  HEENT:  MMM  Neuro:  Oriented x3, PERRLA, EOMI, RICHARDS 5/5 (RLE deferred re: safeguard). No carotid bruit.   Cards:  RRR  Respiratory:  no respiratory distress  Abdomen:  soft  Extremities:  no edema  Skin:  warm/dry            ICU Vital Signs Last 24 Hrs  T(C): 36.6 (28 Oct 2020 03:00), Max: 37 (27 Oct 2020 19:00)  T(F): 97.9 (28 Oct 2020 03:00), Max: 98.6 (27 Oct 2020 19:00)  HR: 58 (28 Oct 2020 06:45) (45 - 105)  BP: 101/58 (27 Oct 2020 11:45) (66/37 - 164/81)  BP(mean): 68 (27 Oct 2020 11:45) (42 - 93)  ABP: 140/53 (28 Oct 2020 06:45) (79/39 - 153/59)  ABP(mean): 83 (28 Oct 2020 06:45) (54 - 87)  RR: 16 (28 Oct 2020 06:00) (7 - 21)  SpO2: 99% (28 Oct 2020 06:00) (91% - 100%)      10-27-20 @ 07:01  -  10-28-20 @ 07:00  --------------------------------------------------------  IN: 2420.9 mL / OUT: 2200 mL / NET: 220.9 mL        aspirin 325 milliGRAM(s) Oral daily  atorvastatin 80 milliGRAM(s) Oral at bedtime  chlorhexidine 4% Liquid 1 Application(s) Topical <User Schedule>  cholecalciferol 2000 Unit(s) Oral daily  clopidogrel Tablet 75 milliGRAM(s) Oral daily  levETIRAcetam 750 milliGRAM(s) Oral daily  norepinephrine Infusion 0.05 MICROgram(s)/kG/Min (5.53 mL/Hr) IV Continuous <Continuous>  ondansetron Injectable 4 milliGRAM(s) IV Push once PRN                            12.0   10.85 )-----------( 331      ( 28 Oct 2020 00:33 )             38.7     10-28    137  |  106  |  17  ----------------------------<  142<H>  3.6   |  21<L>  |  0.90    Ca    8.5      28 Oct 2020 00:33  Phos  2.9     10-28  Mg     1.7     10-28             HPI: Pt is a 53 y/o F with PMH of CAD, Quadruple CABG 10/2019, CVA 6/2019- with residual L sided weakness, complicated by seizures.  Additional history include NIMESH, HTN, HLD, peripheral vascular disease, B/L carotid artery disease.   Of note, pt had prior R carotid stent. [facility]. Intraop course complicated by hypotension    Evnts: remains on NE with occasional sinus bradycardia       EXAMINATION:  General:  calm  HEENT:  MMM  Neuro:  Oriented x3, PERRLA, EOMI, RICHARDS 5/5 . No carotid bruit.   Cards:  RRR  Respiratory:  no respiratory distress  Abdomen:  soft  Extremities:  no edema  Skin:  warm/dry            ICU Vital Signs Last 24 Hrs  T(C): 36.6 (28 Oct 2020 03:00), Max: 37 (27 Oct 2020 19:00)  T(F): 97.9 (28 Oct 2020 03:00), Max: 98.6 (27 Oct 2020 19:00)  HR: 58 (28 Oct 2020 06:45) (45 - 105)  BP: 101/58 (27 Oct 2020 11:45) (66/37 - 164/81)  BP(mean): 68 (27 Oct 2020 11:45) (42 - 93)  ABP: 140/53 (28 Oct 2020 06:45) (79/39 - 153/59)  ABP(mean): 83 (28 Oct 2020 06:45) (54 - 87)  RR: 16 (28 Oct 2020 06:00) (7 - 21)  SpO2: 99% (28 Oct 2020 06:00) (91% - 100%)      10-27-20 @ 07:01  -  10-28-20 @ 07:00  --------------------------------------------------------  IN: 2420.9 mL / OUT: 2200 mL / NET: 220.9 mL        aspirin 325 milliGRAM(s) Oral daily  atorvastatin 80 milliGRAM(s) Oral at bedtime  chlorhexidine 4% Liquid 1 Application(s) Topical <User Schedule>  cholecalciferol 2000 Unit(s) Oral daily  clopidogrel Tablet 75 milliGRAM(s) Oral daily  levETIRAcetam 750 milliGRAM(s) Oral daily  norepinephrine Infusion 0.05 MICROgram(s)/kG/Min (5.53 mL/Hr) IV Continuous <Continuous>  ondansetron Injectable 4 milliGRAM(s) IV Push once PRN                            12.0   10.85 )-----------( 331      ( 28 Oct 2020 00:33 )             38.7     10-28    137  |  106  |  17  ----------------------------<  142<H>  3.6   |  21<L>  |  0.90    Ca    8.5      28 Oct 2020 00:33  Phos  2.9     10-28  Mg     1.7     10-28

## 2020-10-28 NOTE — PHYSICAL THERAPY INITIAL EVALUATION ADULT - NAME OF CLINICIAN
Pt called, left message. She is requesting refill of medication used for nausea. Pt asked that this be sent to [facility].   
NSCU MARC Lemus aware of PT session

## 2020-10-28 NOTE — PROGRESS NOTE ADULT - SUBJECTIVE AND OBJECTIVE BOX
Patient seen and examined at bedside.    --Anticoagulation--  clopidogrel Tablet 75 milliGRAM(s) Oral daily    T(C): 36.6 (10-28-20 @ 03:00), Max: 37 (10-27-20 @ 19:00)  HR: 49 (10-28-20 @ 03:00) (46 - 90)  BP: 101/58 (10-27-20 @ 11:45) (66/37 - 164/81)  RR: 21 (10-28-20 @ 03:00) (7 - 21)  SpO2: 93% (10-28-20 @ 03:00) (91% - 100%)  Wt(kg): --    Exam: intact, groin without hematoma, distal pulses 2+ BLE

## 2020-10-28 NOTE — PHYSICAL THERAPY INITIAL EVALUATION ADULT - FINE MOTOR COORDINATION, LEFT HAND THUMB/FINGER OPPOSITION SKILLS, OT EVAL
mild impairment/Pt reports mild impairments in fine motor coordination L UE from prior CVA June 2019

## 2020-10-28 NOTE — PROGRESS NOTE ADULT - ASSESSMENT
ASSESSMENT/PLAN:  S/P L carotid stent, previous right carotid stenting     NEURO:  Q1 neurochecks  Analgesics  Carotid US  monitor for reperfusion injury   aspirin 325 mg  and plavix 75 mg  for carotis artery disease  resume atorvastatin   continue her home keppra, ? history of seizures   resume clonazepam tonight   Activity: [] mobilize as tolerated [x] Bedrest for now [] PT [] OT [] PMNR    PULM: History of NIMESH  Keep sats >92%, NC   chest xray   Incentive spirometry    CV: History of CAD S/P CABG  hold anti-HTN   pro-BNP   hypotensive and bradycardic possible from the carotid manipulation/neurogenic   r/o cardiogenic cause will get ECG, troponin   r/o bleeding, will get cbc  insert a line  change phenylephrine to NE given  bradycardia  keep atropine at bedside   TTE   SBP goal 100- 140, MAP> 65 mmhg     RENAL:  Fluids:  ml bolus  NS 75 ml/hr   hyponatremia, likely SIADH, will monitor     GI:  Diet: NPO for now, can start feeds tonight if hemodynamically more stable   GI prophylaxis [x] not indicated [] PPI [] other:  Bowel regimen [] colace [x] senna [] other:    ENDO:   Goal euglycemia (-180)  TSH pending     HEME/ONC:  VTE prophylaxis: [] SCDs [] chemoprophylaxis [x] hold chemoprophylaxis due to: fresh post op     ID:  Perioperative antibiotics    MISC:    SOCIAL/FAMILY:  [x] awaiting [] updated at bedside [] family meeting    CODE STATUS:  [x] Full Code [] DNR [] DNI [] Palliative/Comfort Care    DISPOSITION:  [x] ICU [] Stroke Unit [] Floor [] EMU [] RCU [] PCU    [x] Patient is at high risk of neurologic deterioration/death and is HD unstable  due to: stroke, hemorrhage, hyperperfusion.      Time spent: 40 critical care minutes    Contact: [phone] ASSESSMENT/PLAN:  S/P L carotid stent, previous right carotid stenting   complicated by hypotension and bradycardia likely neurogenic from the carotid bulb     NEURO:  Q4 neurochecks  Analgesics  Carotid US pending   monitor for reperfusion injury   aspirin 325 mg  and plavix 75 mg  for carotis artery disease  atorvastatin 80 mg    continue her home keppra, ? history of seizures   resume clonazepam tonight , she takes it daily at home   Activity: [] mobilize as tolerated [x] Bedrest for now [] PT [] OT [] PMNR    PULM: History of NIMESH  Keep sats >92%, on RA   chest xray clear   Incentive spirometry    CV: History of CAD S/P CABG  hold anti-HTN   hypotensive and bradycardic possible from the carotid manipulation/neurogenic   not cardiogenic:  ECG unchanged, troponin 123  not hemorrhagic stable hgb   insert a line  on  NE given  bradycardia  keep atropine at bedside   TTE pending   SBP goal 100- 140, MAP> 65 mmhg     RENAL:  Fluids:   NS 50 ml/hr   hyponatremia, likely SIADH, will monitor     GI:  Diet: regular diet   GI prophylaxis [x] not indicated [] PPI [] other:  Bowel regimen [] colace [x] senna [] other:    ENDO:   Goal euglycemia (-180)  TSH harry     HEME/ONC:  VTE prophylaxis: [] SCDs [x] chemoprophylaxis lovenox 40 mg sc qhs  [] hold chemoprophylaxis due to:     ID:  afebrile     MISC:    SOCIAL/FAMILY:  [x] awaiting [] updated at bedside [] family meeting    CODE STATUS:  [x] Full Code [] DNR [] DNI [] Palliative/Comfort Care    DISPOSITION:  [x] ICU [] Stroke Unit [] Floor [] EMU [] RCU [] PCU    [x] Patient is at high risk of neurologic deterioration/death and is HD unstable  due to: stroke, hemorrhage, hyperperfusion.   Patient wants to leave home, i told her that she is on NE and cannot leave, she is at risk for cardiac arrest if she leaves, she decided to stay in the unit     Time spent: 40 critical care minutes    Contact: [phone]

## 2020-10-28 NOTE — PHYSICAL THERAPY INITIAL EVALUATION ADULT - PERTINENT HX OF CURRENT PROBLEM, REHAB EVAL
53 y/o F with PMHx of CAD, Quadruple CABG 10/2019, CVA 6/2019- with residual L sided weakness, complicated by seizures. 53 y/o F with PMHx of CAD, Quadruple CABG 10/2019, CVA 6/2019- with residual L sided weakness, complicated by seizures. CONTINUED:

## 2020-10-28 NOTE — CONSULT NOTE ADULT - ASSESSMENT
54 F diffuse vascular disease including CAD s/p CABG, PAD, bilateral carotid disease now s/p left carotid stenting with bradycardia and hypotension in the post-procedure period likely related to carotid stimulation.

## 2020-10-28 NOTE — PROGRESS NOTE ADULT - SUBJECTIVE AND OBJECTIVE BOX
NSCU ATTENDING -- ADDITIONAL PROGRESS NOTE    Nighttime rounds were performed -- please refer to earlier Progress Note for HPI details.    T(C): 36.7 (10-28-20 @ 19:00), Max: 37.1 (10-28-20 @ 11:00)  HR: 59 (10-28-20 @ 21:45) (45 - 105)  BP: --  RR: 19 (10-28-20 @ 21:45) (0 - 22)  SpO2: 98% (10-28-20 @ 21:45) (93% - 100%)  Wt(kg): --    Relevant labwork and imaging reviewed.    Doing well post-stenting.  Off danny GTT now.  Likely transfer to floor in AM.

## 2020-10-28 NOTE — PHYSICAL THERAPY INITIAL EVALUATION ADULT - PRECAUTIONS/LIMITATIONS, REHAB EVAL
fall precautions/seizure precautions Additional history include NIMESH, HTN, HLD, peripheral vascular disease, B/L carotid artery disease./fall precautions/seizure precautions

## 2020-10-28 NOTE — PHYSICAL THERAPY INITIAL EVALUATION ADULT - DISCHARGE DISPOSITION, PT EVAL
no skilled PT needs/Home with assist as needed for ADLs and functional mobility from spouse. Return to outpatient OT to improve fine motor coordination L hand.

## 2020-10-29 ENCOUNTER — TRANSCRIPTION ENCOUNTER (OUTPATIENT)
Age: 55
End: 2020-10-29

## 2020-10-29 VITALS
RESPIRATION RATE: 14 BRPM | HEART RATE: 61 BPM | SYSTOLIC BLOOD PRESSURE: 102 MMHG | DIASTOLIC BLOOD PRESSURE: 65 MMHG | OXYGEN SATURATION: 97 %

## 2020-10-29 PROCEDURE — 93880 EXTRACRANIAL BILAT STUDY: CPT

## 2020-10-29 PROCEDURE — 85027 COMPLETE CBC AUTOMATED: CPT

## 2020-10-29 PROCEDURE — 36223 PLACE CATH CAROTID/INOM ART: CPT | Mod: 59

## 2020-10-29 PROCEDURE — 37215 TRANSCATH STENT CCA W/EPS: CPT

## 2020-10-29 PROCEDURE — 36226 PLACE CATH VERTEBRAL ART: CPT

## 2020-10-29 PROCEDURE — 84100 ASSAY OF PHOSPHORUS: CPT

## 2020-10-29 PROCEDURE — C1884: CPT

## 2020-10-29 PROCEDURE — 83735 ASSAY OF MAGNESIUM: CPT

## 2020-10-29 PROCEDURE — 84436 ASSAY OF TOTAL THYROXINE: CPT

## 2020-10-29 PROCEDURE — 93306 TTE W/DOPPLER COMPLETE: CPT

## 2020-10-29 PROCEDURE — 82553 CREATINE MB FRACTION: CPT

## 2020-10-29 PROCEDURE — 83880 ASSAY OF NATRIURETIC PEPTIDE: CPT

## 2020-10-29 PROCEDURE — 97530 THERAPEUTIC ACTIVITIES: CPT

## 2020-10-29 PROCEDURE — 80061 LIPID PANEL: CPT

## 2020-10-29 PROCEDURE — 71045 X-RAY EXAM CHEST 1 VIEW: CPT

## 2020-10-29 PROCEDURE — 84480 ASSAY TRIIODOTHYRONINE (T3): CPT

## 2020-10-29 PROCEDURE — C1894: CPT

## 2020-10-29 PROCEDURE — 93970 EXTREMITY STUDY: CPT

## 2020-10-29 PROCEDURE — C1876: CPT

## 2020-10-29 PROCEDURE — 99233 SBSQ HOSP IP/OBS HIGH 50: CPT

## 2020-10-29 PROCEDURE — 83036 HEMOGLOBIN GLYCOSYLATED A1C: CPT

## 2020-10-29 PROCEDURE — 93005 ELECTROCARDIOGRAM TRACING: CPT

## 2020-10-29 PROCEDURE — 84484 ASSAY OF TROPONIN QUANT: CPT

## 2020-10-29 PROCEDURE — 84443 ASSAY THYROID STIM HORMONE: CPT

## 2020-10-29 PROCEDURE — C1769: CPT

## 2020-10-29 PROCEDURE — 97161 PT EVAL LOW COMPLEX 20 MIN: CPT

## 2020-10-29 PROCEDURE — C1887: CPT

## 2020-10-29 PROCEDURE — 82550 ASSAY OF CK (CPK): CPT

## 2020-10-29 PROCEDURE — C1725: CPT

## 2020-10-29 PROCEDURE — C1760: CPT

## 2020-10-29 PROCEDURE — 82962 GLUCOSE BLOOD TEST: CPT

## 2020-10-29 PROCEDURE — 80048 BASIC METABOLIC PNL TOTAL CA: CPT

## 2020-10-29 PROCEDURE — 97116 GAIT TRAINING THERAPY: CPT

## 2020-10-29 RX ORDER — LEVETIRACETAM 500 MG/1
500 TABLET, FILM COATED ORAL
Qty: 56 | Refills: 0 | Status: DISCONTINUED | COMMUNITY
Start: 2020-10-05 | End: 2020-10-29

## 2020-10-29 RX ORDER — LEVETIRACETAM 750 MG/1
750 TABLET, EXTENDED RELEASE ORAL DAILY
Qty: 90 | Refills: 1 | Status: DISCONTINUED | COMMUNITY
Start: 2019-12-16 | End: 2020-10-29

## 2020-10-29 RX ADMIN — LEVETIRACETAM 750 MILLIGRAM(S): 250 TABLET, FILM COATED ORAL at 11:22

## 2020-10-29 RX ADMIN — Medication 325 MILLIGRAM(S): at 11:22

## 2020-10-29 RX ADMIN — CLOPIDOGREL BISULFATE 75 MILLIGRAM(S): 75 TABLET, FILM COATED ORAL at 11:22

## 2020-10-29 RX ADMIN — Medication 2000 UNIT(S): at 11:22

## 2020-10-29 NOTE — PROGRESS NOTE ADULT - SUBJECTIVE AND OBJECTIVE BOX
HPI: Pt is a 55 y/o F with PMH of CAD, Quadruple CABG 10/2019, CVA 6/2019- with residual L sided weakness, complicated by seizures.  Additional history include NIMESH, HTN, HLD, peripheral vascular disease, B/L carotid artery disease.   Of note, pt had prior R carotid stent. [facility]. Intraop course complicated by hypotension    Evnts: remains on NE with occasional sinus bradycardia       EXAMINATION:  General:  calm  HEENT:  MMM  Neuro:  Oriented x3, PERRLA, EOMI, RICHARDS 5/5 . No carotid bruit.   Cards:  RRR  Respiratory:  no respiratory distress  Abdomen:  soft  Extremities:  no edema  Skin:  warm/dry      ICU Vital Signs Last 24 Hrs  T(C): 36.5 (29 Oct 2020 03:00), Max: 37.1 (28 Oct 2020 11:00)  T(F): 97.7 (29 Oct 2020 03:00), Max: 98.8 (28 Oct 2020 11:00)  HR: 52 (29 Oct 2020 06:00) (50 - 98)  BP: --  BP(mean): --  ABP: 115/51 (29 Oct 2020 06:00) (77/45 - 143/57)  ABP(mean): 73 (29 Oct 2020 06:00) (46 - 94)  RR: 17 (29 Oct 2020 06:00) (0 - 22)  SpO2: 95% (29 Oct 2020 06:00) (91% - 100%)      10-28-20 @ 07:01  -  10-29-20 @ 07:00  --------------------------------------------------------  IN: 2302.1 mL / OUT: 2000 mL / NET: 302.1 mL        aspirin 325 milliGRAM(s) Oral daily  atorvastatin 80 milliGRAM(s) Oral at bedtime  chlorhexidine 4% Liquid 1 Application(s) Topical <User Schedule>  cholecalciferol 2000 Unit(s) Oral daily  clopidogrel Tablet 75 milliGRAM(s) Oral daily  enoxaparin Injectable 40 milliGRAM(s) SubCutaneous <User Schedule>  insulin lispro (ADMELOG) corrective regimen sliding scale   SubCutaneous three times a day before meals  levETIRAcetam 750 milliGRAM(s) Oral daily  norepinephrine Infusion 0.05 MICROgram(s)/kG/Min (5.53 mL/Hr) IV Continuous <Continuous>  ondansetron Injectable 4 milliGRAM(s) IV Push once PRN  polyethylene glycol 3350 17 Gram(s) Oral two times a day  senna 2 Tablet(s) Oral at bedtime  sodium chloride 0.9%. 1000 milliLiter(s) (50 mL/Hr) IV Continuous <Continuous>                            12.0   10.85 )-----------( 331      ( 28 Oct 2020 00:33 )             38.7     10-28    137  |  106  |  17  ----------------------------<  142<H>  3.6   |  21<L>  |  0.90    Ca    8.5      28 Oct 2020 00:33  Phos  2.9     10-28  Mg     1.7     10-28                 HPI:   Pt is a 55 y/o F with PMH of CAD, quadruple CABG 10/2019, CVA 6/2019- with residual L sided weakness, complicated by seizures.  Additional history include NIMESH, HTN, HLD, peripheral vascular disease, B/L carotid artery disease.   Of note, pt had prior R carotid stent.  Transferred to [facility] s/p L carotid stent. Intraop/ post-op course complicated by hypotension    Events: Off levophed >10hrs. Hemodynamically improved.      EXAMINATION:  General:  calm  HEENT:  MMM  Neuro:  Oriented x3, PERRLA, EOMI, moving all extremities 5/5 . No carotid bruit.   Cards:  RRR  Respiratory:  no respiratory distress  Abdomen:  soft  Extremities:  no edema  Skin:  warm/dry      ICU Vital Signs Last 24 Hrs  T(C): 36.5 (29 Oct 2020 03:00), Max: 37.1 (28 Oct 2020 11:00)  T(F): 97.7 (29 Oct 2020 03:00), Max: 98.8 (28 Oct 2020 11:00)  HR: 52 (29 Oct 2020 06:00) (50 - 98)  ABP: 115/51 (29 Oct 2020 06:00) (77/45 - 143/57)  ABP(mean): 73 (29 Oct 2020 06:00) (46 - 94)  RR: 17 (29 Oct 2020 06:00) (0 - 22)  SpO2: 95% (29 Oct 2020 06:00) (91% - 100%)      10-28-20 @ 07:01  -  10-29-20 @ 07:00  --------------------------------------------------------  IN: 2302.1 mL / OUT: 2000 mL / NET: 302.1 mL        aspirin 325 milliGRAM(s) Oral daily  atorvastatin 80 milliGRAM(s) Oral at bedtime  chlorhexidine 4% Liquid 1 Application(s) Topical <User Schedule>  cholecalciferol 2000 Unit(s) Oral daily  clopidogrel Tablet 75 milliGRAM(s) Oral daily  enoxaparin Injectable 40 milliGRAM(s) SubCutaneous <User Schedule>  insulin lispro (ADMELOG) corrective regimen sliding scale   SubCutaneous three times a day before meals  levETIRAcetam 750 milliGRAM(s) Oral daily  norepinephrine Infusion 0.05 MICROgram(s)/kG/Min (5.53 mL/Hr) IV Continuous <Continuous>  ondansetron Injectable 4 milliGRAM(s) IV Push once PRN  polyethylene glycol 3350 17 Gram(s) Oral two times a day  senna 2 Tablet(s) Oral at bedtime  sodium chloride 0.9%. 1000 milliLiter(s) (50 mL/Hr) IV Continuous <Continuous>                            12.0   10.85 )-----------( 331      ( 28 Oct 2020 00:33 )             38.7     10-28    137  |  106  |  17  ----------------------------<  142<H>  3.6   |  21<L>  |  0.90    Ca    8.5      28 Oct 2020 00:33  Phos  2.9     10-28  Mg     1.7     10-28      TTE: 10/28:   Conclusions:  1. Normal mitral valve. Minimal mitral regurgitation.  2. Aortic valve not well visualized; appears trileaflet  with normal opening. No aortic valve regurgitation seen.  3. Mildly dilated left atrium.  LA volume index = 35 cc/m2.  4. Hyperdynamic left ventricular systolic function.  5. Indeterminate diastolic function.  6. The right ventricle is not well visualized; grossly  normal right ventricular size and systolic function.  7. Estimated pulmonary artery systolic pressure equals 42  mm Hg, assuming right atrial pressure equals 8 mm Hg,  consistent with mild pulmonary pressures.  *** Compared with echocardiogram of 10/10/2019, results are  similar on today's study. Prior study was a KEAG[facility].  ------------------------------------------------------------------------             HPI:   Pt is a 53 y/o F with PMH of CAD, quadruple CABG 10/2019, CVA 6/2019- with residual L sided weakness, complicated by seizures.  Additional history include NIMESH, HTN, HLD, peripheral vascular disease, B/L carotid artery disease.   Of note, pt had prior R carotid stent.  [facility]. Intraop/ post-op course complicated by hypotension    Events: Off levophed >10hrs. Hemodynamically improved.      Allergies/Medications:   Allergies:        Allergies:  	No Known Allergies:     Home Medications:   * Patient Currently Takes Medications as of 22-Oct-2020 07:36 documented in Structured Notes  · 	clonazePAM 0.5 mg oral tablet: Last Dose Taken:  , 0.5 tab(s) orally once a day (at bedtime) MDD:1  · 	metoprolol tartrate 50 mg oral tablet: Last Dose Taken:  , 1 tab(s) orally every 8 hours  · 	clopidogrel 75 mg oral tablet: Last Dose Taken:  , 1 tab(s) orally once a day  · 	atorvastatin 80 mg oral tablet: Last Dose Taken:  , 1 tab(s) orally once a day (at bedtime)  · 	aspirin 325 mg oral tablet: Last Dose Taken:  , orally once a day  · 	Keppra 750 mg oral tablet: Last Dose Taken:  , orally once a day AT 6AM  	  · 	lisinopril 20 mg oral tablet: Last Dose Taken:  , 1 tab(s) orally once a day  · 	amLODIPine 10 mg oral tablet: Last Dose Taken:  , 1 tab(s) orally once a day  · 	Vitamin D3 50,000 intl units (1250 mcg) oral capsule: Last Dose Taken:  , orally once a week    PMH/PSH/FH/SH:    Past Medical, Past Surgical, and Family History:  PAST MEDICAL HISTORY:  Anxiety     Atherosclerotic heart disease of native coronary artery without angina pectoris     CVA (cerebrovascular accident) Ischemic Right MCA stroke    Hyperlipemia     Hypertension     NIMESH (obstructive sleep apnea) used BIPAP only while hospitalized    Seizure.     PAST SURGICAL HISTORY:  History of ischemic right MCA stroke s/p SANDIE stent on 6/21/19    S/P CABG x 4 10/2019.     FAMILY HISTORY:  Family history of breast cancer, Mother  Family history of myocardial infarction, Father.     Social History:  · Marital Status	  · Lives With	children; spouse     Substance Use History:  · Substance Use	never used     Alcohol Use History:  · Have you ever consumed alcohol	yes...  · Alcohol Type	occasional alcohol use  · Alcohol Frequency	monthly or less  · Problems Related to Alcohol Use	no  · 1. Have you felt you ought to CUT down on your drinking?	no  · 2. Have people ANNOYED you by criticizing your drinking?	no  · 3. Have you ever felt bad or GUILTY about your drinking?	no  · 4. Have you ever needed an "EYE OPENER", a drink first thing in the morning to steady your nerves or get rid of a hangover?	no     Tobacco Usage:  · Tobacco Usage: Former smoker  · Tobacco Type: cigarettes  · Number of Packs per Day: 1  · Number of yrs: 30  · Pack yrs: 30  · Longest Period Tobacco-Free: more than 1 year ago     Passive Smoke Exposure:  · Passive Smoke Exposure	No    Presurgical Screening:    Cardiovascular:  · Activity	able to climb 2 flights of stairs, able to walk 2-3 blocks and do moderate house work  · Energy expenditure (mets)	6.70 mets  · Symptoms	none     Cardiac Tests:  · Last Echocardiogram	10/2019- INTRA-OP KEAGAN  · Last Stress Test	DENIES  · Last Cardiac Angiogram/Imaging	JUNE 2019      EXAMINATION:  General:  calm  HEENT:  MMM  Neuro:  Oriented x3, PERRLA, EOMI, moving all extremities 5/5 . No carotid bruit.   Cards:  RRR  Respiratory:  no respiratory distress  Abdomen:  soft  Extremities:  no edema  Skin:  warm/dry      ICU Vital Signs Last 24 Hrs  T(C): 36.5 (29 Oct 2020 03:00), Max: 37.1 (28 Oct 2020 11:00)  T(F): 97.7 (29 Oct 2020 03:00), Max: 98.8 (28 Oct 2020 11:00)  HR: 52 (29 Oct 2020 06:00) (50 - 98)  ABP: 115/51 (29 Oct 2020 06:00) (77/45 - 143/57)  ABP(mean): 73 (29 Oct 2020 06:00) (46 - 94)  RR: 17 (29 Oct 2020 06:00) (0 - 22)  SpO2: 95% (29 Oct 2020 06:00) (91% - 100%)      10-28-20 @ 07:01  -  10-29-20 @ 07:00  --------------------------------------------------------  IN: 2302.1 mL / OUT: 2000 mL / NET: 302.1 mL        aspirin 325 milliGRAM(s) Oral daily  atorvastatin 80 milliGRAM(s) Oral at bedtime  chlorhexidine 4% Liquid 1 Application(s) Topical <User Schedule>  cholecalciferol 2000 Unit(s) Oral daily  clopidogrel Tablet 75 milliGRAM(s) Oral daily  enoxaparin Injectable 40 milliGRAM(s) SubCutaneous <User Schedule>  insulin lispro (ADMELOG) corrective regimen sliding scale   SubCutaneous three times a day before meals  levETIRAcetam 750 milliGRAM(s) Oral daily  norepinephrine Infusion 0.05 MICROgram(s)/kG/Min (5.53 mL/Hr) IV Continuous <Continuous>  ondansetron Injectable 4 milliGRAM(s) IV Push once PRN  polyethylene glycol 3350 17 Gram(s) Oral two times a day  senna 2 Tablet(s) Oral at bedtime  sodium chloride 0.9%. 1000 milliLiter(s) (50 mL/Hr) IV Continuous <Continuous>                            12.0   10.85 )-----------( 331      ( 28 Oct 2020 00:33 )             38.7     10-28    137  |  106  |  17  ----------------------------<  142<H>  3.6   |  21<L>  |  0.90    Ca    8.5      28 Oct 2020 00:33  Phos  2.9     10-28  Mg     1.7     10-28      TTE: 10/28:   Conclusions:  1. Normal mitral valve. Minimal mitral regurgitation.  2. Aortic valve not well visualized; appears trileaflet  with normal opening. No aortic valve regurgitation seen.  3. Mildly dilated left atrium.  LA volume index = 35 cc/m2.  4. Hyperdynamic left ventricular systolic function.  5. Indeterminate diastolic function.  6. The right ventricle is not well visualized; grossly  normal right ventricular size and systolic function.  7. Estimated pulmonary artery systolic pressure equals 42  mm Hg, assuming right atrial pressure equals 8 mm Hg,  consistent with mild pulmonary pressures.  *** Compared with echocardiogram of 10/10/2019, results are  similar on today's study. Prior study was a KEAG[facility].  ------------------------------------------------------------------------

## 2020-10-29 NOTE — DISCHARGE NOTE PROVIDER - CARE PROVIDER_API CALL
Demetris Barrios)  Neurosurgery  [address] [place] [address], [address] [place] [address]  Phone: [phone]  Fax: [phone]  Follow Up Time:

## 2020-10-29 NOTE — PROGRESS NOTE ADULT - ASSESSMENT
ASSESSMENT/PLAN:  S/P L carotid stent, previous right carotid stenting   complicated by hypotension and bradycardia likely neurogenic from the carotid bulb     NEURO:  Q4 neurochecks  Analgesics  Carotid US pending   monitor for reperfusion injury   aspirin 325 mg  and plavix 75 mg  for carotis artery disease  atorvastatin 80 mg    continue her home keppra, ? history of seizures   resume clonazepam tonight , she takes it daily at home   Activity: [] mobilize as tolerated [x] Bedrest for now [] PT [] OT [] PMNR    PULM: History of NIMESH  Keep sats >92%, on RA   chest xray clear   Incentive spirometry    CV: History of CAD S/P CABG  hold anti-HTN   hypotensive and bradycardic possible from the carotid manipulation/neurogenic   not cardiogenic:  ECG unchanged, troponin 123  not hemorrhagic stable hgb   insert a line  on  NE given  bradycardia  keep atropine at bedside   TTE pending   SBP goal 100- 140, MAP> 65 mmhg     RENAL:  Fluids:   NS 50 ml/hr   hyponatremia, likely SIADH, will monitor     GI:  Diet: regular diet   GI prophylaxis [x] not indicated [] PPI [] other:  Bowel regimen [] colace [x] senna [] other:    ENDO:   Goal euglycemia (-180)  TSH harry     HEME/ONC:  VTE prophylaxis: [] SCDs [x] chemoprophylaxis lovenox 40 mg sc qhs  [] hold chemoprophylaxis due to:     ID:  afebrile     MISC:    SOCIAL/FAMILY:  [x] awaiting [] updated at bedside [] family meeting    CODE STATUS:  [x] Full Code [] DNR [] DNI [] Palliative/Comfort Care    DISPOSITION:  [x] ICU [] Stroke Unit [] Floor [] EMU [] RCU [] PCU    [x] Patient is at high risk of neurologic deterioration/death and is HD unstable  due to: stroke, hemorrhage, hyperperfusion.   Patient wants to leave home, i told her that she is on NE and cannot leave, she is at risk for cardiac arrest if she leaves, she decided to stay in the unit     Time spent: 40 critical care minutes    Contact: [phone] ASSESSMENT/PLAN:  S/P L carotid stent, previous right carotid stenting   complicated by hypotension and bradycardia likely neurogenic from the carotid bulb     NEURO:  Q4 neurochecks  Analgesics prn  Carotid US reviewed, instent thrombosis on R, impeded flow on L. Per NSGY, this is stable; no intervention warranted.   Continue Aspirin 325 mg and plavix 75 mg  for carotid artery disease  Continue atorvastatin 80 mg    Continue her home keppra, history of seizures   Resume clonazepam, pt uses it at home RTC at bedtime.   Activity: [] mobilize as tolerated [] Bedrest for now [x] PT [x] OT [] PMNR    PULM: History of NIMESH  Keep sats >92%, on RA   chest xray clear   Incentive spirometry    CV: History of CAD S/P CABG. Complicated by hypotension and bradycardia, possibly from carotid manipulation/neurogenic.  SBP goal 100- 140, MAP> 65 mmHg   Hold anti-HTN.   Pt clearly instructed to measure her BPs and HRs at home twice or three times a day and to keep a log of these numbers.   Instructed to resume lisinopril and amlodipine if SBP >140. Hold metoprolol since HRs 60s. Resume once instructed to do so by her cardiologist  She follows regularly with cardiology. Has appointment with PCP in 2 weeks. Instructed to present these number (HR and BP) to her cardiology and PCP.  Shock resolved- Not cardiogenic: ECG unchanged, troponin wnl  Shock- Not hemorrhagic: stable hb   No longer on pressors  TTE: EF 75%    RENAL: Hyponatremia, likely SIADH  Fluids:   DC IVF      GI:  Diet: Regular diet   GI prophylaxis [x] not indicated [] PPI [] other:  Bowel regimen [] colace [x] senna [] other:    ENDO:   Goal euglycemia (-180)  TSH normal     HEME/ONC:  Hb stable  VTE prophylaxis: [] SCDs [x] chemoprophylaxis lovenox 40 mg sc qhs  [] hold chemoprophylaxis due to:     ID:  Afebrile     MISC:    SOCIAL/FAMILY:  [x] awaiting [] updated at bedside [] family meeting    CODE STATUS:  [x] Full Code [] DNR [] DNI [] Palliative/Comfort Care      DISPOSITION:  ICU [] Stroke Unit [] Floor [] EMU [] RCU [] PCU  [x] DC home. Stable condition.     Pt clearly instructed to measure her BPs and HRs at home twice or three times a day and to keep a log of these numbers.   Instructed to resume lisinopril and amlodipine if SBP >140. Hold metoprolol since HRs 60s. Resume once instructed to do so by her cardiologist  She follows regularly with cardiology. Has appointment with PCP in 2 weeks. Instructed to present these number (HR and BP) to her cardiology and PCP.  Follow up with neurosurgery.          ASSESSMENT/PLAN:  S/P L carotid stent, previous right carotid stenting   complicated by hypotension and bradycardia likely neurogenic from the carotid bulb     NEURO:  Q4 neurochecks  Analgesics prn  Carotid US reviewed, instent thrombosis on R, impeded flow on L. Per NSGY, this is stable; no intervention warranted.   Continue Aspirin 325 mg and plavix 75 mg  for carotid artery disease  Continue atorvastatin 80 mg    Continue her home keppra, history of seizures   Resume clonazepam, pt uses it at home RTC at bedtime.   Activity: [] mobilize as tolerated [] Bedrest for now [x] PT [x] OT [] PMNR    PULM: History of NIMESH  Keep sats >92%, on RA   chest xray clear   Incentive spirometry    CV: History of CAD S/P CABG. Complicated by hypotension and bradycardia, possibly from carotid manipulation/neurogenic. resolved  SBP goal 100- 140, MAP> 65 mmHg   Hold anti-HTN.   Pt clearly instructed to measure her BPs and HRs at home twice or three times a day and to keep a log of these numbers.   Instructed to resume lisinopril and amlodipine if SBP >140. Hold metoprolol since HRs 60s. Resume once instructed to do so by her cardiologist  She follows regularly with cardiology. Has appointment with PCP in 2 weeks. Instructed to present these number (HR and BP) to her cardiology and PCP.  Shock resolved- Not cardiogenic: ECG unchanged, troponin wnl  Shock- Not hemorrhagic: stable hb   No longer on pressors  TTE: EF 75%    RENAL: Hyponatremia, likely SIADH  Fluids:   DC IVF      GI:  Diet: Regular diet   GI prophylaxis [x] not indicated [] PPI [] other:  Bowel regimen [] colace [x] senna [] other:    ENDO:   Goal euglycemia (-180)  TSH normal     HEME/ONC:  Hb stable  VTE prophylaxis: [] SCDs [x] chemoprophylaxis lovenox 40 mg sc qhs  [] hold chemoprophylaxis due to:     ID:  Afebrile     MISC:    SOCIAL/FAMILY:  [x] awaiting [] updated at bedside [] family meeting    CODE STATUS:  [x] Full Code [] DNR [] DNI [] Palliative/Comfort Care      DISPOSITION:  ICU [] Stroke Unit [] Floor [] EMU [] RCU [] PCU  [x] DC home. Stable condition.     Pt clearly instructed to measure her BPs and HRs at home twice or three times a day and to keep a log of these numbers.   Instructed to resume lisinopril and amlodipine if SBP >140. Hold metoprolol since HRs 60s. Resume once instructed to do so by her cardiologist  She follows regularly with cardiology. Has appointment with PCP in 2 weeks. Instructed to present these number (HR and BP) to her cardiology and PCP.  Follow up with neurosurgery.

## 2020-10-29 NOTE — DISCHARGE NOTE PROVIDER - NSDCCPCAREPLAN_GEN_ALL_CORE_FT
PRINCIPAL DISCHARGE DIAGNOSIS  Diagnosis: History of carotid artery disease  Assessment and Plan of Treatment: Please follow up with Dr. Barrios in office      SECONDARY DISCHARGE DIAGNOSES  Diagnosis: Cerebrovascular accident (CVA) due to stenosis of cerebral artery  Assessment and Plan of Treatment: Cerebrovascular accident (CVA) due to stenosis of cerebral artery    Diagnosis: Essential hypertension  Assessment and Plan of Treatment: Please checks your blood pressure frequently at home  IF blood pressure systolic>140, may take lisinopril and amlodipine  Do not take metoprolol  Please follow up with Dr. Coon in office

## 2020-10-29 NOTE — DISCHARGE NOTE PROVIDER - NS AS DC PROVIDER CONTACT Y/N MULTI
Telephone Encounter by Kanika More at 02/08/17 08:43 AM     Author:  Kanika More Service:  (none) Author Type:  Patient      Filed:  02/08/17 08:47 AM Encounter Date:  2/8/2017 Status:  Signed     :  Kanika More (Patient )              DEJAN ROSS    Patient Age: 65 year old    ACCT STATUS:   MESSAGE:[TH1.1T]   Prakash from [facility] calling in regards to, needing patients EDG result notes faxed to Fax#: [phone]. Prakash states, that patient is at [facility] right now and Dr. Sandoval. M, needs those results from . Please advise.[TH1.1M]    Next and Last Visit with Provider and Department  Next visit with ANNIE MANLEY is on No match found  Next visit with GASTROENTEROLOGY is on No match found  Last visit with ANNIE MANLEY was on 01/27/2017 at 12:40 PM in GASTROENTEROLOGY HLD  Last visit with GASTROENTEROLOGY was on 01/27/2017 at 12:40 PM in GASTROENTEROLOGY HLD     WEIGHT AND HEIGHT: As of 01/27/2017 weight is 172 lbs.(78.019 kg). Height is 5' 2\"(1.575 m).   BMI is 31.45 kg/(m^2) calculated from:     Height 5' 2\" (1.575 m) as of 1/27/17     Weight 172 lb (78.019 kg) as of 1/27/17      Allergies     Allergen  Reactions   • Sulfa Antibiotics Rash     Current outpatient prescriptions       Medication  Sig Dispense Refill   • simethicone (MYLICON) 125 MG chewable tablet See Colonoscopy instructions. 2 Tab 0   • bisacodyl (DULCOLAX) 5 MG EC tablet See Colonoscopy Instructions. 2 Tab 0   • Sod Picosulfate-Mag Ox-Cit Acd (PREPOPIK) 10-3.5-12 MG-GM-GM PACK See Colonoscopy Instructions. 1 Each 0   • sertraline (ZOLOFT) 100 MG tablet Take 1 Tab by mouth daily. 30 Tab 2   • sucralfate (CARAFATE) 1 G tablet Take 1 Tab by mouth 4 (four) times daily. 120 Each 3   • pantoprazole (PROTONIX) 40 MG tablet Take 1 Tab by mouth 2 (two) times daily. 180 Tab 0   • alprazolam (XANAX) 0.25 MG tablet Take 1 Tab by mouth 3 (three) times daily as needed. 90 Tab 0    • naproxen (NAPROSYN) 500 MG tablet TAKE 1 TABLET BY MOUTH TWICE DAILY WITH MEALS 60 Tab 0   • Ferrous Sulfate (IRON) 325 (65 FE) MG TABS Take 325 mg by mouth 2 (two) times daily. 30 Tab 0   • quetiapine (SEROQUEL) 50 MG tablet Take 1 Tab by mouth daily. 90 Tab 2   • hydrochlorothiazide (HYDRODIURIL) 25 MG tablet Take 1 Tab by mouth daily. 90 Tab 2   • ropinirole (REQUIP) 4 MG tablet Take 1 Tab by mouth nightly. 90 Tab 2   • Cetirizine HCl (ZYRTE ALLERGY OR) daily        PHARMACY to use: [facility] [facility]          Pharmacy preference(s) on file:    [facility] [address]  PRIMEMAIL (MAIL ORDER) ELECTRONIC  [facility], [place] [address] [address] [place] [facility], [place] [address]    CALL BACK INFO:[TH1.1T] Ok to leave response (including medical information) with family member or on answering machine[TH1.1M]  ROUTING:[TH1.1T] Patient's physician/staff[TH1.1M]        PCP: Jaimee Napier DO         INS: Payor: [employer] OPEN ACCESS / Plan: N/A / Product Type: *No Product type* / Note: This is the primary coverage, but no account was found for this location or the patient's primary location.   ADDRESS:  [address] [place] [address][TH1.1T]       Revision History        User Key Date/Time User Provider Type Action    > TH1.1 02/08/17 08:47 AM Kanika More Patient  Sign    M - Manual, T - Template             Yes

## 2020-10-29 NOTE — DISCHARGE NOTE PROVIDER - NSDCMRMEDTOKEN_GEN_ALL_CORE_FT
amLODIPine 10 mg oral tablet: 1 tab(s) orally once a day  aspirin 325 mg oral tablet: orally once a day  atorvastatin 80 mg oral tablet: 1 tab(s) orally once a day (at bedtime)  clonazePAM 0.5 mg oral tablet: 0.5 tab(s) orally once a day (at bedtime) MDD:1  clopidogrel 75 mg oral tablet: 1 tab(s) orally once a day  Keppra 750 mg oral tablet: orally once a day AT 6AM    lisinopril 20 mg oral tablet: 1 tab(s) orally once a day  Vitamin D3 50,000 intl units (1250 mcg) oral capsule: orally once a week

## 2020-10-29 NOTE — DISCHARGE NOTE PROVIDER - NSDCACTIVITY_GEN_ALL_CORE
Stairs allowed/Walking - Indoors allowed/Do not drive or operate machinery/No heavy lifting/straining/Walking - Outdoors allowed/Do not make important decisions

## 2020-10-29 NOTE — PROGRESS NOTE ADULT - SUBJECTIVE AND OBJECTIVE BOX
Patient seen and examined at bedside.    --Anticoagulation--  clopidogrel Tablet 75 milliGRAM(s) Oral daily  enoxaparin Injectable 40 milliGRAM(s) SubCutaneous <User Schedule>    T(C): 36.5 (10-29-20 @ 03:00), Max: 37.1 (10-28-20 @ 11:00)  HR: 54 (10-29-20 @ 04:00) (47 - 98)  BP: --  RR: 12 (10-29-20 @ 04:00) (0 - 22)  SpO2: 97% (10-29-20 @ 04:00) (91% - 100%)  Wt(kg): --    Exam: AAOx3, FC, RICHARDS 5/5, EOMI, no facial

## 2020-10-29 NOTE — DISCHARGE NOTE PROVIDER - NSDCFUSCHEDAPPT_GEN_ALL_CORE_FT
DEB MENDEZ ; 12/15/2020 ; [facility] [address] [facility]  DEB MENDEZ ; 12/30/2020 ; [facility] [address] DEB Daniels ; 01/19/2021 ; [facility] [address] [facility]

## 2020-10-29 NOTE — DISCHARGE NOTE PROVIDER - HOSPITAL COURSE
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  Post angiogram she was hypotensive and bradycardic requiring pressors.   Her vitals have remained stable off pressors.  She was seen and cleared for discharge home by physical therapy.  Will discharge patient home today per Dr. Barrios. Patient encouraged to monitor blood pressure at home and to follow up with PMD and cardiology.             Pt clearly instructed to measure her BPs and HRs at home twice or three times a day and to keep a log of these numbers.   Instructed to resume lisinopril and amlodipine if SBP >140. Hold metoprolol since HRs 60s. Resume once instructed to do so by her cardiologist  She follows regularly with cardiology. Has appointment with PCP in 2 weeks. Instructed to present these number (HR and BP) to her cardiology and PCP.  Follow up with neurosurgery.

## 2020-10-30 ENCOUNTER — APPOINTMENT (OUTPATIENT)
Dept: CARDIOLOGY | Facility: CLINIC | Age: 55
End: 2020-10-30

## 2020-11-02 ENCOUNTER — RX RENEWAL (OUTPATIENT)
Age: 55
End: 2020-11-02

## 2020-11-03 ENCOUNTER — TRANSCRIPTION ENCOUNTER (OUTPATIENT)
Age: 55
End: 2020-11-03

## 2020-11-05 ENCOUNTER — RX RENEWAL (OUTPATIENT)
Age: 55
End: 2020-11-05

## 2020-11-12 ENCOUNTER — APPOINTMENT (OUTPATIENT)
Dept: NEUROSURGERY | Facility: CLINIC | Age: 55
End: 2020-11-12
Payer: COMMERCIAL

## 2020-11-12 VITALS
TEMPERATURE: 97.6 F | BODY MASS INDEX: 36.37 KG/M2 | HEART RATE: 90 BPM | SYSTOLIC BLOOD PRESSURE: 143 MMHG | DIASTOLIC BLOOD PRESSURE: 92 MMHG | OXYGEN SATURATION: 96 % | WEIGHT: 240 LBS | RESPIRATION RATE: 17 BRPM | HEIGHT: 68 IN

## 2020-11-12 DIAGNOSIS — I65.01 OCCLUSION AND STENOSIS OF RIGHT VERTEBRAL ARTERY: ICD-10-CM

## 2020-11-12 PROCEDURE — 99024 POSTOP FOLLOW-UP VISIT: CPT

## 2020-11-12 NOTE — PHYSICAL EXAM
[General Appearance - Alert] : alert [General Appearance - In No Acute Distress] : in no acute distress [Person] : oriented to person [Place] : oriented to place [Time] : oriented to time [Motor Strength] : muscle strength was normal in all four extremities [] : no respiratory distress [Abnormal Walk] : normal gait

## 2020-11-13 NOTE — ASSESSMENT
[FreeTextEntry1] : Impression: 54yr old female s/p left carotid angioplasty and stenting 10/27/2020; s/p angioplasty and stenting of symptomatic right carotid stenosis 6/21/2019.\par \par Plan:\par Discussed the cerebral angiogram showed the right carotid stent was open and there was no in stent stenosis no intervention needed and we proceeded with treating the left carotid artery stenosis. \par  continue Plavix 75mg daily\par  continue aspirin 325mg daily\par Post procedure carotid ultrasound SANDIE  LICA \par Plan of care is Carotid Doppler Ultrasound in six months April 2021 \par Right vertebral artery origin stenosis present on the angiogram discussed this can be treated with angioplasty and stenting to prevent it from causing a stroke. Patient will call should she wish to proceed. \par

## 2020-11-13 NOTE — REASON FOR VISIT
[Follow-Up: _____] : a [unfilled] follow-up visit [Spouse] : spouse [FreeTextEntry1] : Mariana Mcgowan is a 54yr old female here for a follow up visit after having cerebral angiogram done 10/27/2020. At that time we noted there was no significant in stent stenosis of the right carotid artery and we proceeded with angioplasty and stenting of left carotid artery stenosis. She is on dual antiplatelet therapy with aspirin and plavix. She tolerated the procedure well. Post op course she had episodes of hypotension which have now resolved. Since she has been home she feels.

## 2020-12-04 ENCOUNTER — RX RENEWAL (OUTPATIENT)
Age: 55
End: 2020-12-04

## 2020-12-04 RX ORDER — LISINOPRIL 20 MG/1
20 TABLET ORAL
Qty: 90 | Refills: 1 | Status: ACTIVE | COMMUNITY
Start: 2020-12-04 | End: 1900-01-01

## 2020-12-15 ENCOUNTER — APPOINTMENT (OUTPATIENT)
Dept: NEUROLOGY | Facility: CLINIC | Age: 55
End: 2020-12-15

## 2021-01-04 ENCOUNTER — APPOINTMENT (OUTPATIENT)
Dept: CARDIOLOGY | Facility: CLINIC | Age: 56
End: 2021-01-04
Payer: COMMERCIAL

## 2021-01-04 VITALS — SYSTOLIC BLOOD PRESSURE: 148 MMHG | HEART RATE: 93 BPM | DIASTOLIC BLOOD PRESSURE: 85 MMHG | OXYGEN SATURATION: 100 %

## 2021-01-04 VITALS — SYSTOLIC BLOOD PRESSURE: 128 MMHG | DIASTOLIC BLOOD PRESSURE: 86 MMHG

## 2021-01-04 DIAGNOSIS — Z00.00 ENCOUNTER FOR GENERAL ADULT MEDICAL EXAMINATION W/OUT ABNORMAL FINDINGS: ICD-10-CM

## 2021-01-04 PROCEDURE — 93000 ELECTROCARDIOGRAM COMPLETE: CPT

## 2021-01-04 PROCEDURE — 99214 OFFICE O/P EST MOD 30 MIN: CPT

## 2021-01-04 PROCEDURE — 99072 ADDL SUPL MATRL&STAF TM PHE: CPT

## 2021-01-07 LAB
ALBUMIN SERPL ELPH-MCNC: 4.5 G/DL
ALP BLD-CCNC: 74 U/L
ALT SERPL-CCNC: 38 U/L
ANION GAP SERPL CALC-SCNC: 14 MMOL/L
APO LP(A) SERPL-MCNC: 12.3 NMOL/L
AST SERPL-CCNC: 27 U/L
BASOPHILS # BLD AUTO: 0.09 K/UL
BASOPHILS NFR BLD AUTO: 1 %
BILIRUB SERPL-MCNC: 1.2 MG/DL
BUN SERPL-MCNC: 17 MG/DL
CALCIUM SERPL-MCNC: 9.5 MG/DL
CHLORIDE SERPL-SCNC: 102 MMOL/L
CHOLEST SERPL-MCNC: 130 MG/DL
CO2 SERPL-SCNC: 23 MMOL/L
CREAT SERPL-MCNC: 1 MG/DL
EOSINOPHIL # BLD AUTO: 0.22 K/UL
EOSINOPHIL NFR BLD AUTO: 2.4 %
ESTIMATED AVERAGE GLUCOSE: 163 MG/DL
GLUCOSE SERPL-MCNC: 123 MG/DL
HBA1C MFR BLD HPLC: 7.3 %
HCT VFR BLD CALC: 47.2 %
HDLC SERPL-MCNC: 37 MG/DL
HGB BLD-MCNC: 14.6 G/DL
IMM GRANULOCYTES NFR BLD AUTO: 0.4 %
LDLC SERPL CALC-MCNC: 58 MG/DL
LYMPHOCYTES # BLD AUTO: 2.07 K/UL
LYMPHOCYTES NFR BLD AUTO: 22.8 %
MAN DIFF?: NORMAL
MCHC RBC-ENTMCNC: 24.8 PG
MCHC RBC-ENTMCNC: 30.9 GM/DL
MCV RBC AUTO: 80.3 FL
MONOCYTES # BLD AUTO: 0.52 K/UL
MONOCYTES NFR BLD AUTO: 5.7 %
NEUTROPHILS # BLD AUTO: 6.15 K/UL
NEUTROPHILS NFR BLD AUTO: 67.7 %
NONHDLC SERPL-MCNC: 92 MG/DL
PLATELET # BLD AUTO: 312 K/UL
POTASSIUM SERPL-SCNC: 4.3 MMOL/L
PROT SERPL-MCNC: 7.4 G/DL
RBC # BLD: 5.88 M/UL
RBC # FLD: 14.6 %
SODIUM SERPL-SCNC: 139 MMOL/L
TRIGL SERPL-MCNC: 171 MG/DL
WBC # FLD AUTO: 9.09 K/UL

## 2021-01-22 ENCOUNTER — APPOINTMENT (OUTPATIENT)
Dept: CARDIOLOGY | Facility: CLINIC | Age: 56
End: 2021-01-22
Payer: COMMERCIAL

## 2021-01-22 VITALS — SYSTOLIC BLOOD PRESSURE: 147 MMHG | HEART RATE: 92 BPM | OXYGEN SATURATION: 98 % | DIASTOLIC BLOOD PRESSURE: 85 MMHG

## 2021-01-22 PROCEDURE — 99215 OFFICE O/P EST HI 40 MIN: CPT

## 2021-01-22 PROCEDURE — 99072 ADDL SUPL MATRL&STAF TM PHE: CPT

## 2021-01-22 NOTE — ASSESSMENT
[FreeTextEntry1] : 54 yo F with PMH of R CVA with residual L sided weakness 2/2 carotid artery stenosis s/p bilateral carotid stenting on aspirin 325mg and Plavix 75mg, HLD on atorvastatin 80mg, HTN and newly diagnosed DM not on medication who presents for initial consultation of symptomatic PAD.\par \par - Symptomatic with claudication, MODESTA 0.67 on left, 1.14 on right\par - Venous doppler with likely occlusion of L superficial femoral artery, ordered US abdominal aorta and duplex of arterial LE bilaterally\par - Discussed exercise therapy\par - Continue aspirin 325mg daily, Plavix 75mg daily, high intensity statin\par \par f/u in 2-3 months after arterial duplex results

## 2021-01-22 NOTE — REASON FOR VISIT
[Consultation] : a consultation regarding [Peripheral Vascular Disease] : peripheral vascular disease

## 2021-01-22 NOTE — PHYSICAL EXAM
[General Appearance - Well Developed] : well developed [Normal Appearance] : normal appearance [General Appearance - Well Nourished] : well nourished [Normal Conjunctiva] : the conjunctiva exhibited no abnormalities [Normal Oral Mucosa] : normal oral mucosa [Heart Rate And Rhythm] : heart rate and rhythm were normal [Heart Sounds] : normal S1 and S2 [Murmurs] : no murmurs present [Edema] : no peripheral edema present [Auscultation Breath Sounds / Voice Sounds] : lungs were clear to auscultation bilaterally [Abdomen Soft] : soft [Abnormal Walk] : normal gait [Cyanosis, Localized] : no localized cyanosis [Nail Clubbing] : no clubbing of the fingernails [] : no rash [No Skin Ulcers] : no skin ulcer [Oriented To Time, Place, And Person] : oriented to person, place, and time [Affect] : the affect was normal [FreeTextEntry1] : 1+ L DP, R DP not palpable

## 2021-02-02 ENCOUNTER — APPOINTMENT (OUTPATIENT)
Dept: NEUROLOGY | Facility: CLINIC | Age: 56
End: 2021-02-02
Payer: COMMERCIAL

## 2021-02-02 VITALS
HEART RATE: 89 BPM | HEIGHT: 68 IN | BODY MASS INDEX: 36.37 KG/M2 | WEIGHT: 240 LBS | SYSTOLIC BLOOD PRESSURE: 135 MMHG | DIASTOLIC BLOOD PRESSURE: 90 MMHG

## 2021-02-02 DIAGNOSIS — Z91.89 OTHER SPECIFIED PERSONAL RISK FACTORS, NOT ELSEWHERE CLASSIFIED: ICD-10-CM

## 2021-02-02 PROCEDURE — 99213 OFFICE O/P EST LOW 20 MIN: CPT

## 2021-02-02 PROCEDURE — 99072 ADDL SUPL MATRL&STAF TM PHE: CPT

## 2021-02-02 NOTE — HISTORY OF PRESENT ILLNESS
[FreeTextEntry1] : Feeling ok. She is noting some cramping in the legs. She has some PAD but unclear if this may be statin related. \par She denies chest pain. No SOB

## 2021-02-02 NOTE — DISCUSSION/SUMMARY
[FreeTextEntry1] : Will pursue PCSK-9 inhibitor.  mg qd per NSx. \par Initial BP is elevated but repeat is in range. \par Check LpA. Check lipids. \par Weight loss.

## 2021-02-03 PROBLEM — Z91.89 AT RISK OF SEIZURES: Status: ACTIVE | Noted: 2019-09-16

## 2021-02-03 RX ORDER — ASPIRIN 81 MG/1
81 TABLET ORAL DAILY
Refills: 0 | Status: DISCONTINUED | COMMUNITY
Start: 2019-10-16 | End: 2021-02-03

## 2021-02-03 RX ORDER — LEVETIRACETAM 500 MG/1
500 TABLET, FILM COATED, EXTENDED RELEASE ORAL
Qty: 180 | Refills: 0 | Status: DISCONTINUED | COMMUNITY
Start: 2020-04-29

## 2021-02-03 RX ORDER — LEVETIRACETAM 750 MG/1
750 TABLET, FILM COATED ORAL
Qty: 14 | Refills: 0 | Status: DISCONTINUED | COMMUNITY
Start: 2020-10-29 | End: 2021-02-03

## 2021-02-03 RX ORDER — ASPIRIN 325 MG/1
325 TABLET, FILM COATED ORAL DAILY
Refills: 0 | Status: ACTIVE | COMMUNITY

## 2021-02-03 NOTE — ASSESSMENT
[FreeTextEntry1] : Ms. MENDEZ presents with onset of focal seizures in setting of acute stroke, initially recurring at initial doses of Keppra, with no further recurrence on levetiracetam 1000 q12. Exam is notable for mild L HP, affecting arm most, face minimally, and sparing leg. Now seizure-free since initial seizure, including seizure-free off anticonvulsants since the last 3 months.  We reviewed symptoms of focal seizures, and I asked her to let me know if she has any concerning symptoms or strange events, that might represent recurrent seizures.\par \par Plan\par 1.  Levetiracetam DC'd in November 2020\par 2.  Follow-up as needed.\par \par

## 2021-02-03 NOTE — HISTORY OF PRESENT ILLNESS
[FreeTextEntry1] : *** 2021  *** \par Ms. Mcgowan returns for scheduled follow-up.  She had angioplasty of the carotid with stenting last .  She has been tapering her levetiracetam, and discontinued it in 2020.  She has had no recurrence of events.  She is feeling well.\par \par *** 2020  ***\par Ms. DEB MCGOWAN returns for scheduled follow-up appointment. Ms. MCGOWAN reports that in the interval since her last visit, she is doing well. No inteval seizures.  She is taking levetiracetam  q12.\par Mood is good, no side effects. She will be seeing Dr. Barrios in Oct to plan for management of L ICA stenosis.  Ms. MCGOWAN follows also with Yovana Thakkar NP of stroke service. \par \par *** 2020  ***\par -Appointment was conducted by video-conference in place of office appointment due to due to heightened concern for coronavirus infection risk.\par -Verbal consent given on 2020 at 10:18 by the patient DEB MCGOWAN ( Dec 29 1965) who understands that tele-visit will be charged to insurance and may involve co-pay for patient.\par -Physician location: home\par -Patient location: home\par -Individuals on call: Dr. Raines, DEB MCGOWAN\par  \par Ms. MCGOWAN is doing well on levetiracetam  q12. Denies interval seizures. Denies side effects like irritabilty or worsening mood. Anxious about COVID19 and apprehensive about reducing dose, as was our original plan. \par *** 2020  ***\par Ms. DEB MCGOWAN returns for scheduled follow-up appointment. Ms. MCGOWAN reports that in the interval since her last visit, she is doing well. No interval seizures. She is currently taking levetiracetam  q12. \par \par *** 2019  ***\par Ms. MCGOWAN reports no interval seizure.  She had CABG x 4 vessels done this fall and is now fairly recovered by still has pain in L arm post-surgery. She needs carotid stent on left, which will be done in 2020.  Ms. MCGOWAN reports increased irritability, possibly due to levetiracetam.  Otherwise no problems.  \par \par *** 2019  ***\par Ms. MCGOWAN presents for evaluation of seizures that occurred at onset of R MCA stroke from R ICA stenosis in 2019.  MRI DWI shows signal abnormality in watershed distribution.  Ms. MCGOWAN recalls that initial symptoms was jerking on L arm that lasted about 2 minutes during which she was also not able to speak.  When this occurred she went to [facility]. At [facility] she experienced a second similar event and was diagnosed with stroke on CT and resultant seizure and she was started on levetiracetam. She was transferred to [facility] where she underwent carotid stenting. During her hospitalization at [facility], Ms. MCGOWAN had recurrent episodes of twitching in L arm and Keppra dose was increased to 1000mg q12.  Ms. MCGOWAN has continued on levetiracetam and has not had recurrence of focal motor seizures or any other event suspicious for seizures.  Her use of the R arm has improved over time, but her left hand is still impaired. \par \par *** from Stroke NP 2019 ***\par Ms. Mcgowan is a 53 year old female PMHx Anxiety, 40 pk/yr smoking hx (active time of stroke), does not get regular checkups who presents today for post hospitalization follow up after a right MCA artery ischemic infarct on 19. She is s/p right internal carotid stent stent place on 19 with Dr. Barrios.\par \par HPI:\par 54y/o F RH  PMH Anxiety, 40 pk/yr smoking hx(still active), does not get regular checkups presented to [facility] with left arm numbness, spasms starting 20:00 on . A phone consult was performed with [facility] and during that time she had some worsening of her neurological status but left arm weakness and spasms. At that time she was given Ativan for possible seizure. CT noncontrast and CT angiogram of head and neck revealed right frontal hypodensity suggestive of right MCA infarction in the setting of bilateral severe carotid stenosis. She was then transferred to [facility], on exam NIHSS was 6, alert, awake however unable to move her left arm. She was also noticed to have continuous twitching of left arm and face, drop in oxygen saturation for which she was put on BiPAP.\par She is s/p right internal carotid stent stent place on 19 with Dr. Barrios. Carotid dopplers performed: unimpeded antegrade flow through the stented right ICA, severe, >70% stenosis of the left ICA, flow limiting stenosis of b/l ECA ,\par On  AM she was noted to have jerking movements of the left hand to LUE with subsequent fascial twitching and resolved with 2mg of IV Ativan, 1g of Keppra. VEEG with no epileptiform abnormalities or seizures recorded. Continue Keppra 1g BID for seizure ppx, maintain seizure precautions.

## 2021-02-08 ENCOUNTER — APPOINTMENT (OUTPATIENT)
Dept: NEUROLOGY | Facility: CLINIC | Age: 56
End: 2021-02-08

## 2021-02-08 VITALS — TEMPERATURE: 97.3 F

## 2021-04-08 NOTE — RAPID RESPONSE TEAM SUMMARY - NSMEDICATIONSRRT_GEN_ALL_CORE
Ativan 2mg IV   Keppra 1000mg IV
Detail Level: Detailed
Detail Level: Generalized
Detail Level: Zone

## 2021-04-22 ENCOUNTER — NON-APPOINTMENT (OUTPATIENT)
Age: 56
End: 2021-04-22

## 2021-05-03 ENCOUNTER — APPOINTMENT (OUTPATIENT)
Dept: ULTRASOUND IMAGING | Facility: CLINIC | Age: 56
End: 2021-05-03
Payer: COMMERCIAL

## 2021-05-03 ENCOUNTER — OUTPATIENT (OUTPATIENT)
Dept: OUTPATIENT SERVICES | Facility: HOSPITAL | Age: 56
LOS: 1 days | End: 2021-05-03
Payer: COMMERCIAL

## 2021-05-03 DIAGNOSIS — Z86.73 PERSONAL HISTORY OF TRANSIENT ISCHEMIC ATTACK (TIA), AND CEREBRAL INFARCTION WITHOUT RESIDUAL DEFICITS: Chronic | ICD-10-CM

## 2021-05-03 DIAGNOSIS — I77.9 DISORDER OF ARTERIES AND ARTERIOLES, UNSPECIFIED: ICD-10-CM

## 2021-05-03 DIAGNOSIS — Z00.8 ENCOUNTER FOR OTHER GENERAL EXAMINATION: ICD-10-CM

## 2021-05-03 DIAGNOSIS — Z95.1 PRESENCE OF AORTOCORONARY BYPASS GRAFT: Chronic | ICD-10-CM

## 2021-05-03 PROCEDURE — 93880 EXTRACRANIAL BILAT STUDY: CPT | Mod: 26

## 2021-05-03 PROCEDURE — 93880 EXTRACRANIAL BILAT STUDY: CPT

## 2021-05-05 ENCOUNTER — OUTPATIENT (OUTPATIENT)
Dept: OUTPATIENT SERVICES | Facility: HOSPITAL | Age: 56
LOS: 1 days | End: 2021-05-05
Payer: COMMERCIAL

## 2021-05-05 ENCOUNTER — RESULT REVIEW (OUTPATIENT)
Age: 56
End: 2021-05-05

## 2021-05-05 ENCOUNTER — APPOINTMENT (OUTPATIENT)
Dept: ULTRASOUND IMAGING | Facility: HOSPITAL | Age: 56
End: 2021-05-05
Payer: COMMERCIAL

## 2021-05-05 DIAGNOSIS — Z00.00 ENCOUNTER FOR GENERAL ADULT MEDICAL EXAMINATION WITHOUT ABNORMAL FINDINGS: ICD-10-CM

## 2021-05-05 DIAGNOSIS — Z86.73 PERSONAL HISTORY OF TRANSIENT ISCHEMIC ATTACK (TIA), AND CEREBRAL INFARCTION WITHOUT RESIDUAL DEFICITS: Chronic | ICD-10-CM

## 2021-05-05 DIAGNOSIS — Z95.1 PRESENCE OF AORTOCORONARY BYPASS GRAFT: Chronic | ICD-10-CM

## 2021-05-05 DIAGNOSIS — I73.9 PERIPHERAL VASCULAR DISEASE, UNSPECIFIED: ICD-10-CM

## 2021-05-05 PROCEDURE — 93925 LOWER EXTREMITY STUDY: CPT

## 2021-05-05 PROCEDURE — 76770 US EXAM ABDO BACK WALL COMP: CPT | Mod: 26

## 2021-05-05 PROCEDURE — 76775 US EXAM ABDO BACK WALL LIM: CPT

## 2021-05-05 PROCEDURE — 93925 LOWER EXTREMITY STUDY: CPT | Mod: 26

## 2021-05-06 ENCOUNTER — NON-APPOINTMENT (OUTPATIENT)
Age: 56
End: 2021-05-06

## 2021-05-12 ENCOUNTER — APPOINTMENT (OUTPATIENT)
Dept: CARDIOLOGY | Facility: CLINIC | Age: 56
End: 2021-05-12
Payer: COMMERCIAL

## 2021-05-12 ENCOUNTER — NON-APPOINTMENT (OUTPATIENT)
Age: 56
End: 2021-05-12

## 2021-05-12 VITALS
SYSTOLIC BLOOD PRESSURE: 134 MMHG | OXYGEN SATURATION: 99 % | HEART RATE: 83 BPM | WEIGHT: 260 LBS | HEIGHT: 68 IN | BODY MASS INDEX: 39.4 KG/M2 | DIASTOLIC BLOOD PRESSURE: 83 MMHG

## 2021-05-12 PROCEDURE — 99072 ADDL SUPL MATRL&STAF TM PHE: CPT

## 2021-05-12 PROCEDURE — 99214 OFFICE O/P EST MOD 30 MIN: CPT

## 2021-05-12 PROCEDURE — 93000 ELECTROCARDIOGRAM COMPLETE: CPT

## 2021-05-12 NOTE — PHYSICAL EXAM

## 2021-05-12 NOTE — HISTORY OF PRESENT ILLNESS
[FreeTextEntry1] : MS. Mcgowan presents for follow up. Since last being seen she been found to have significant PAD. She had U/S of aorta with incidental finding on the liver. She does have claudication. \par She is vaccinated. \par She is still working with the sugar.

## 2021-05-12 NOTE — DISCUSSION/SUMMARY
[FreeTextEntry1] : Overall doing well from a cardiac standpoint.\par Continue present medications.\par Continue to follow with vascular medicine. Follow up recent LDL. Monitor for disease progression. \par Follow up in 3 months.

## 2021-05-14 ENCOUNTER — APPOINTMENT (OUTPATIENT)
Dept: CARDIOLOGY | Facility: CLINIC | Age: 56
End: 2021-05-14
Payer: COMMERCIAL

## 2021-05-14 VITALS
HEIGHT: 68 IN | SYSTOLIC BLOOD PRESSURE: 138 MMHG | OXYGEN SATURATION: 98 % | DIASTOLIC BLOOD PRESSURE: 84 MMHG | BODY MASS INDEX: 39.4 KG/M2 | HEART RATE: 84 BPM | WEIGHT: 260 LBS

## 2021-05-14 PROCEDURE — 99072 ADDL SUPL MATRL&STAF TM PHE: CPT

## 2021-05-14 PROCEDURE — 99214 OFFICE O/P EST MOD 30 MIN: CPT

## 2021-05-14 NOTE — ASSESSMENT
[FreeTextEntry1] :  Assessment:\par 1.  PAD\par L SFA occlusion\par R SFA disease\par \par Plan:\par 1.  Continue OMT\par 2.  Referral to supervised exercise therapy at [address]\par 3.  Daily foot checks\par 4.  Return in 6 months\par 5.  If she remains symptomatic, then can consider adding cilostazol (would need echo prior to assure normal EF)\par 6.  Would hold off on any procedural therapies for PAD.  \par d/w Dr Moise

## 2021-05-14 NOTE — HISTORY OF PRESENT ILLNESS
[FreeTextEntry1] : 5/14/2021\par Carotids with patent stents\par Arterial duplex with L sfa occlusion and r SFA stenosis.\par She can walk 1/2 block \par must stop due to calf pains \par pains resolve after stopping.  \par  [place]par no foot ulceration, tissue loss, no pain at rest\par \par

## 2021-05-14 NOTE — PHYSICAL EXAM
[General Appearance - Well Developed] : well developed [General Appearance - Well Nourished] : well nourished [Normal Appearance] : normal appearance [Normal Oral Mucosa] : normal oral mucosa [Normal Conjunctiva] : the conjunctiva exhibited no abnormalities [Auscultation Breath Sounds / Voice Sounds] : lungs were clear to auscultation bilaterally [Heart Rate And Rhythm] : heart rate and rhythm were normal [Murmurs] : no murmurs present [Heart Sounds] : normal S1 and S2 [Edema] : no peripheral edema present [Abdomen Soft] : soft [Abnormal Walk] : normal gait [Nail Clubbing] : no clubbing of the fingernails [Cyanosis, Localized] : no localized cyanosis [] : no rash [No Skin Ulcers] : no skin ulcer [Oriented To Time, Place, And Person] : oriented to person, place, and time [Affect] : the affect was normal [FreeTextEntry1] : R Dp reduced.  L DP non-palp

## 2021-06-21 ENCOUNTER — RX RENEWAL (OUTPATIENT)
Age: 56
End: 2021-06-21

## 2021-06-23 ENCOUNTER — APPOINTMENT (OUTPATIENT)
Dept: NEUROLOGY | Facility: CLINIC | Age: 56
End: 2021-06-23
Payer: COMMERCIAL

## 2021-06-23 VITALS
BODY MASS INDEX: 40.62 KG/M2 | HEART RATE: 69 BPM | DIASTOLIC BLOOD PRESSURE: 83 MMHG | SYSTOLIC BLOOD PRESSURE: 150 MMHG | HEIGHT: 68 IN | WEIGHT: 268 LBS

## 2021-06-23 DIAGNOSIS — I63.9 CEREBRAL INFARCTION, UNSPECIFIED: ICD-10-CM

## 2021-06-23 DIAGNOSIS — R56.9 CEREBRAL INFARCTION, UNSPECIFIED: ICD-10-CM

## 2021-06-23 DIAGNOSIS — Z86.73 PERSONAL HISTORY OF TRANSIENT ISCHEMIC ATTACK (TIA), AND CEREBRAL INFARCTION W/OUT RESIDUAL DEFICITS: ICD-10-CM

## 2021-06-23 PROCEDURE — 99214 OFFICE O/P EST MOD 30 MIN: CPT

## 2021-06-23 PROCEDURE — 99072 ADDL SUPL MATRL&STAF TM PHE: CPT

## 2021-06-23 RX ORDER — OXYCODONE 10 MG/1
10 TABLET ORAL EVERY 6 HOURS
Refills: 0 | Status: DISCONTINUED | COMMUNITY
Start: 2019-10-16 | End: 2021-06-23

## 2021-06-23 RX ORDER — DOCUSATE SODIUM 100 MG/1
100 CAPSULE, LIQUID FILLED ORAL 3 TIMES DAILY
Refills: 0 | Status: DISCONTINUED | COMMUNITY
Start: 2019-10-16 | End: 2021-06-23

## 2021-06-23 NOTE — HISTORY OF PRESENT ILLNESS
[Home] : at home, [unfilled] , at the time of the visit. [Other Location: e.g. Home (Enter Location, City,State)___] : at [unfilled] [Verbal consent obtained from patient] : the patient, [unfilled] [FreeTextEntry1] : Ms. Mcgowan is a 54 year old female PMHx Anxiety, 40 pk/yr smoking hx (active time of stroke), does not get regular checkups who presents today for follow up after a right MCA artery ischemic infarct on 6/20/19. She is s/p right internal carotid stent stent place on 6/21/19 with Dr. Barrios.\par \par \par She is  tapered off Keppra, no seizures since her stroke in June. No new neurological symptoms; she has made significant motor recovery ; has L numbness of arm.  She remains with left upper extremity weakness. She reports that she has not smoked since stroke. She is currently enrolled in OT.\par \par Cardiology repeated Carotid Doppler and she remains with severe L ICA stenosis and possible stenosis within the stent (according to report) Dr. Barrios had suggested intervention earlier this year but she never followed up due to Covid. She denies any right sided symptoms \par \par HPI:\par 54y/o F RH  PMH Anxiety, 40 pk/yr smoking hx(still active), does not get regular checkups presented to [facility] with left arm numbness, spasms starting 20:00 on 6/19. A phone consult was performed with [facility] and during that time she had some worsening of her neurological status but left arm weakness and spasms. At that time she was given Ativan for possible seizure. CT noncontrast and CT angiogram of head and neck revealed right frontal hypodensity suggestive of right MCA infarction in the setting of bilateral severe carotid stenosis. She was then transferred to [facility], on exam NIHSS was 6, alert, awake however unable to move her left arm. She was also noticed to have continuous twitching of left arm and face, drop in oxygen saturation for which she was put on BiPAP.\par She is s/p right internal carotid stent stent place on 6/21/19 with Dr. Barrios. Carotid dopplers performed: unimpeded antegrade flow through the stented right ICA, severe, >70% stenosis of the left ICA, flow limiting stenosis of b/l ECA ,\par On 6/20 AM she was noted to have jerking movements of the left hand to LUE with subsequent fascial twitching and resolved with 2mg of IV Ativan, 1g of Keppra. VEEG with no epileptiform abnormalities or seizures recorded. Continue Keppra 1g BID for seizure ppx, maintain seizure precautions.\par \par Workup:\par CT/CTA head and neck 6.20.19\par CT HEAD: Acute right middle cerebral artery infarct involving the right \par precentral gyrus and centrum semiovale. ASPECTS score 8 out of 10. No \par hemorrhage or midline shift. \par \par CTA HEAD AND NECK: \par -Severe stenosis at the origin of the right internal carotid artery with \par filling defect in the anterior cavernous segment suspicious for critical \par stenosis/focal occlusion.\par -Moderate to severe stenosis of the proximal cervical segment and mild \par stenosis of the posterior cavernous segment of the left internal carotid \par artery.\par \par CTP 6.20.19\par Presence of changes in the posterior circulation which are somewhat \par difficult to interpret. There is however some evidence of a delay in the \par MTT though less than 6 seconds in the right MCA territory at the level of \par lucency identified on the patient's CT evaluation. The age of this is \par unclear.\par \par CT head 6.20.19\par No interval change in right middle cerebral artery infarct involving the \par right precentral gyrus and centrum semiovale. No hemorrhage. ASPECTS \par score 8 out of 10.\par \par MRI Head 6.20.19\par Acute right frontoparietal and temporal lobe infarcts. Possible subacute \par subcortical white matter infarct right frontal lobe. No acute \par intracranial hemorrhage.\par \par IR angiogram 6.21.19\par Successful angioplasty and stenting of symptomatic right \par carotid stenosis\par \par LE Doppler 6.21.19\par 1.  No evidence of lower extremity deep venous thrombosis within the \par imaged segments of the right lower extremity system.\par 2.  No left lower extremity venous thrombosis.\par \par Carotid Doppler 6.21.19\par There is unimpeded antegrade flow through the stented right \par internal carotid artery. There is a severe, greater than 70% stenosis of the left internal carotid \par artery. There are flow-limiting stenoses of both the right and left external \par carotid arteries.\par \par TTE 6.21.19\par 1. Tethered mitral valve leaflets. Mild mitral\par regurgitation.  Peak mitral valve gradient equals 10 mm Hg,\par mean transmitral valve gradient equals 5 mm Hg, consistent\par with mild to moderate mitral stenosis. ()\par 2. Aortic valve not well visualized; probably normal.\par 3. Severely dilated left atrium.  LA volume index = 50\par cc/m2.\par 4. Increased relative wall thickness with normal left\par ventricular mass index, consistent with concentric left\par ventricular remodeling.\par 5. Moderate to severe global left ventricular systolic\par dysfunction.\par 6. Severe diastolic dysfunction (Stage III).\par 7. Right ventricular enlargement with decreased right\par ventricular systolic function.\par 8. Estimated pulmonary artery systolic pressure equals 69\par mm Hg, assuming right atrial pressure equals 8 mm Hg,\par consistent with severe pulmonary pressures.\par 9. Agitated saline injection demonstrates no evidence of a\par patent foramen ovale.\par

## 2021-06-23 NOTE — DISCUSSION/SUMMARY
[Antithrombotic therapy with ___] : antithrombotic therapy with  [unfilled] [Intensive Blood Pressure Control] : intensive blood pressure control [Lipid Lowering Therapy] : lipid lowering therapy [Patient encouraged to discuss with Primary MD] : I encouraged the patient to discuss these important issues with ~his/her~ primary care doctor [Goals and Counseling] : I have reviewed the goals of stroke risk factor modification. I counseled the patient on measures to reduce stroke risk, including the importance of medication compliance, risk factor control, exercise, healthy diet and avoidance of smoking. I reviewed stroke warning signs and symptoms and appropriate actions to take if such occur. [FreeTextEntry1] : Impression:\par #1 Right MCA artery ischemic infarct in the setting of large artery atherosclerosis - infarct pattern suggestive of watershed MCA -PCA and CORBIN-MCA and MCA embolic appearing infarct in the setting of Right carotid severe stenosis s/p stent \par \par \par #2 Bilateral carotid extracranial stenosis - s/p Carotid stents \par  Left Carotid stenosis - Asymptomatic - No clinical symptoms suggestive of TIA of left hemispheric territory and no silent brain infarcts on MRI.\par On Doppler today she has > 70 % stenosis with elevated velocities, no microemboli detected. Despite my recommendation of no intervention a stent was placed.\par \par - Continue  mg and Plavix 75 mg as directed by Dr. Barrios in setting of ICA stent. \par - Pt evaluated by cardiology while inpatient due to an EF 30-35%, severely dilated left atrium, severe diastolic dysfunction, no PFO s/p cardiac cath on 06/25/19: 3 VCAD as per CT surgery s/p CABG.\par Decision for dual antiplatelet deferred to NeuroSx and Cardiology. From secondary stroke prevention standpoint, ASA 81 mg \par \par - Seizure precautions including safety precautions like not going to high places, not swimming alone without anyone's direct supervision, avoiding to take a bath without direct supervision and preferring to take showers, not operating heavy machinery and not driving for at least a year from most recent seizure were discussed with the patient and available family members in detail \par - Continue to monitor BP at home and notify PCP/Cardiology for readings >140/90. Advised to maintain a log of BP readings. Educated on medication compliance and BP monitoring to prevent secondary stroke and systemic problems \par  Occupational therapy as directed. \par - Continue statin therapy for secondary stroke prevention. LDL 71 Further dose titration and management deferred to PCP/cardiology. Goal LDL <70  \par - Follow up with PCP for statin management and primary care needs such as regular blood work including monitoring of HbA1c (6.2) and cholesterol profile (goal LDL <70), to modify vascular risk factors as well as appropriate routine malignancy screenings \par - Home sleep study referral given to patient to assess for sleep apnea as a vascular risk factor\par - Discussed traveling and the importance of hydration, frequent ambulation and medication compliance with positive understanding \par - Discussed smoking cessation and patient reports that she has not smoked since hospital admission and denies any urges or cravings. Advised patient that if she needs smoking cessation assistance to call me and I will put in referral\par - Carotid Doppler's and TCD  reviewed.

## 2021-06-23 NOTE — PHYSICAL EXAM
[General Appearance - Alert] : alert [General Appearance - In No Acute Distress] : in no acute distress [Oriented To Time, Place, And Person] : oriented to person, place, and time [Impaired Insight] : insight and judgment were intact [Affect] : the affect was normal [Person] : oriented to person [Place] : oriented to place [Time] : oriented to time [Short Term Intact] : short term memory intact [Concentration Intact] : normal concentrating ability [Visual Intact] : visual attention was ~T not ~L decreased [Naming Objects] : no difficulty naming common objects [Repeating Phrases] : no difficulty repeating a phrase [Fluency] : fluency intact [Reading] : reading intact [Cranial Nerves Optic (II)] : visual acuity intact bilaterally,  visual fields full to confrontation, pupils equal round and reactive to light [Cranial Nerves Oculomotor (III)] : extraocular motion intact [Cranial Nerves Trigeminal (V)] : facial sensation intact symmetrically [Cranial Nerves Facial (VII)] : face symmetrical [Cranial Nerves Vestibulocochlear (VIII)] : hearing was intact bilaterally [Cranial Nerves Glossopharyngeal (IX)] : tongue and palate midline [Cranial Nerves Hypoglossal (XII)] : there was no tongue deviation with protrusion [CNS Accessory - Diminished Shoulder Elev. - Left Trapezius] : weakness of shoulder elevation was present on the left [Motor Tone] : muscle tone was normal in all four extremities [Motor Strength] : muscle strength was normal in all four extremities [No Muscle Atrophy] : normal bulk in all four extremities [Paresis Pronator Drift Left-Sided] : a left-sided pronator drift was present [Motor Strength Upper Extremities Left] : there was weakness of the left upper extremity [3] : forearm supination 3/5 [5] : ankle plantar flexion 5/5 [Tactile Decrease Shoulders Left] : diminished left shoulder [Tactile Decrease Entire Left Arm] : diminished left arm [Tactile Decrease Hand] : diminished left hand [Tactile Decrease Neck Left Side] : diminished left side of the neck [Abnormal Walk] : normal gait [Balance] : balance was intact [Dysdiadochokinesia On The Left] : present on the left side [Coordination - Dysmetria Impaired Finger-to-Nose Left Only] : present on the left side [Sclera] : the sclera and conjunctiva were normal [PERRL With Normal Accommodation] : pupils were equal in size, round, reactive to light, with normal accommodation [FreeTextEntry1] : exam limited as visit was telehealth appointment. Below noted exam is from prior in office visit. exam updated to reflect any new changes noted during video conference  [Paresis Pronator Drift Right-Sided] : no pronator drift on the right [Motor Strength Upper Extremities Right] : strength was normal in the right upper extremity [Motor Strength Lower Extremities Right] : strength was normal in the right lower extremity [Motor Strength Lower Extremities Left] : strength was normal in the left lower extremity [Romberg's Sign] : Romberg's sign was negtive [Past-pointing] : there was no past-pointing [Tremor] : no tremor present [Dysdiadochokinesia On The Right] : not present on the ride side [Coordination Dysmetria Impaired Heel-Shin Using Right Heel] : not present on the ride side [Coordination - Dysmetria Impaired Finger-to-Nose Right Only] : not present on the ride side [Coordination Dysmetria Impaired Heel-to-Shin Using Left Heel] : not present on the left side [Plantar Reflex Right Only] : normal on the right [Plantar Reflex Left Only] : normal on the left

## 2021-07-19 NOTE — PATIENT PROFILE ADULT - NSASFALLATTEMPTOOB_GEN_A_NUR
Drysol Counseling:  I discussed with the patient the risks of drysol/aluminum chloride including but not limited to skin rash, itching, irritation, burning. no

## 2021-09-23 ENCOUNTER — APPOINTMENT (OUTPATIENT)
Dept: NEUROSURGERY | Facility: CLINIC | Age: 56
End: 2021-09-23

## 2021-10-06 ENCOUNTER — NON-APPOINTMENT (OUTPATIENT)
Age: 56
End: 2021-10-06

## 2021-10-06 ENCOUNTER — APPOINTMENT (OUTPATIENT)
Dept: CARDIOLOGY | Facility: CLINIC | Age: 56
End: 2021-10-06
Payer: COMMERCIAL

## 2021-10-06 VITALS — SYSTOLIC BLOOD PRESSURE: 144 MMHG | DIASTOLIC BLOOD PRESSURE: 84 MMHG

## 2021-10-06 VITALS — HEART RATE: 80 BPM | OXYGEN SATURATION: 100 % | WEIGHT: 268 LBS | BODY MASS INDEX: 40.62 KG/M2 | HEIGHT: 68 IN

## 2021-10-06 PROCEDURE — 99214 OFFICE O/P EST MOD 30 MIN: CPT

## 2021-10-06 PROCEDURE — 93000 ELECTROCARDIOGRAM COMPLETE: CPT

## 2021-10-06 NOTE — REASON FOR VISIT
[Hyperlipidemia] : hyperlipidemia [Hypertension] : hypertension [Coronary Artery Disease] : coronary artery disease [Carotid, Aortic and Peripheral Vascular Disease] : carotid, aortic and peripheral vascular disease

## 2021-10-25 NOTE — HISTORY OF PRESENT ILLNESS
[FreeTextEntry1] : Spoke to Dr. Puga. He reviewed results of carotid study. HE did not feel she needed additional procedure at this time. WIll reevaluate with Doppler in May. \par \par From a cardiac standpoint she has felt well. No chest pain. No SOB. No edema. No palpitations. No edema. \par BP at home was 130/77. It is usually even lower than that. She notes it is elevated in the office. \par \par Exercise is limited by claudication\par \par Repeat A1c improved on recheck.

## 2021-10-25 NOTE — DISCUSSION/SUMMARY
[FreeTextEntry1] : Continue medications for now as patient states BP is stable at home\par She also reports LDL is at goal as is A1c \par Continue other medications. \par Lifestyle changes stressed\par Follow up in 3 months\par Follow up with Abhi.

## 2021-11-16 ENCOUNTER — RX RENEWAL (OUTPATIENT)
Age: 56
End: 2021-11-16

## 2021-11-16 ENCOUNTER — APPOINTMENT (OUTPATIENT)
Dept: CARDIOLOGY | Facility: CLINIC | Age: 56
End: 2021-11-16

## 2021-11-24 ENCOUNTER — RX RENEWAL (OUTPATIENT)
Age: 56
End: 2021-11-24

## 2022-01-04 ENCOUNTER — APPOINTMENT (OUTPATIENT)
Dept: NEUROLOGY | Facility: CLINIC | Age: 57
End: 2022-01-04

## 2022-01-12 ENCOUNTER — APPOINTMENT (OUTPATIENT)
Dept: CARDIOLOGY | Facility: CLINIC | Age: 57
End: 2022-01-12

## 2022-02-01 NOTE — H&P PST ADULT - NS MD HP PULSE CAROTID

## 2022-04-25 ENCOUNTER — NON-APPOINTMENT (OUTPATIENT)
Age: 57
End: 2022-04-25

## 2022-04-25 ENCOUNTER — APPOINTMENT (OUTPATIENT)
Dept: CARDIOLOGY | Facility: CLINIC | Age: 57
End: 2022-04-25
Payer: COMMERCIAL

## 2022-04-25 VITALS — HEART RATE: 84 BPM | OXYGEN SATURATION: 98 % | SYSTOLIC BLOOD PRESSURE: 156 MMHG | DIASTOLIC BLOOD PRESSURE: 83 MMHG

## 2022-04-25 VITALS — DIASTOLIC BLOOD PRESSURE: 80 MMHG | SYSTOLIC BLOOD PRESSURE: 124 MMHG

## 2022-04-25 PROCEDURE — 99214 OFFICE O/P EST MOD 30 MIN: CPT

## 2022-04-25 PROCEDURE — 93000 ELECTROCARDIOGRAM COMPLETE: CPT

## 2022-05-02 NOTE — HISTORY OF PRESENT ILLNESS
[FreeTextEntry1] : Ms. MENDEZ has a history of CAD. He denies a change in symptoms. He has no chest pain. No SOB. No palpitations. \par Metformin added for A1c 7.2 Now on twice a day. \par Per patient LDL was a goal.

## 2022-05-02 NOTE — DISCUSSION/SUMMARY
[FreeTextEntry1] : Overall doing well from a cardiac standpoint.\par Continue present medications. Lifestyle modifications stressed\par Follow up in 6 months.

## 2022-05-10 ENCOUNTER — APPOINTMENT (OUTPATIENT)
Dept: CARDIOLOGY | Facility: CLINIC | Age: 57
End: 2022-05-10

## 2022-06-17 ENCOUNTER — RX RENEWAL (OUTPATIENT)
Age: 57
End: 2022-06-17

## 2022-08-12 ENCOUNTER — APPOINTMENT (OUTPATIENT)
Dept: CARDIOLOGY | Facility: CLINIC | Age: 57
End: 2022-08-12

## 2022-08-12 VITALS
SYSTOLIC BLOOD PRESSURE: 145 MMHG | HEIGHT: 68 IN | DIASTOLIC BLOOD PRESSURE: 83 MMHG | OXYGEN SATURATION: 97 % | HEART RATE: 82 BPM | WEIGHT: 270 LBS | BODY MASS INDEX: 40.92 KG/M2

## 2022-08-12 DIAGNOSIS — Z87.891 PERSONAL HISTORY OF NICOTINE DEPENDENCE: ICD-10-CM

## 2022-08-12 DIAGNOSIS — Z83.3 FAMILY HISTORY OF DIABETES MELLITUS: ICD-10-CM

## 2022-08-12 DIAGNOSIS — G40.409 OTHER GENERALIZED EPILEPSY AND EPILEPTIC SYNDROMES, NOT INTRACTABLE, W/OUT STATUS EPILEPTICUS: ICD-10-CM

## 2022-08-12 PROCEDURE — 99214 OFFICE O/P EST MOD 30 MIN: CPT

## 2022-08-12 NOTE — ASSESSMENT
[FreeTextEntry1] :  Assessment:\par 1.  PAD\par L SFA occlusion with collaterals\par R SFA disease\par R CFA disease\par [place] Classification 3\par \par Plan:\par 1.  On ASA and Plavix. Continue high intensity Statin.\par 2.  Reviewed walking therapy. Prefers to perform at home.\par Walking Instructions given to the patient:\par Step #1                   Stretch your leg and back muscles for 5 minutes\par Step #2                   Walk at a pace fast enough so you have to stop at around 2 blocks. \par                                 You should stop walking when reaching 3-4 on claudication scale.\par \par Step # 3                  Stop walking completely and stand until the discomfort goes away.\par Do not slow down, but stop completely.\par Do not walk to the point of severe pain.\par \par Step # 4                  Repeat this cycle of walking/stopping/walking/stopping etc for 45 min\par \par Step # 5       When you are finished walking, spend 5 minutes cooling down and\par                                 stretch your leg and back muscles\par \par 3. Daily foot checks.\par 4. Reviewed indications for Pletal. Plan to hold off at this time due to common side effects. \par 5. Reviewed indications for angiogram. Will hold off at this time. Plan for medical therapy.\par 6. Call if any wounds or rest pain noted.\par 7. Continue excellent care with Dr. Moise. \par 8. Follow-up in 1 year. Call if any questions. \par

## 2022-08-12 NOTE — PHYSICAL EXAM
[General Appearance - Well Developed] : well developed [Normal Appearance] : normal appearance [General Appearance - Well Nourished] : well nourished [Normal Conjunctiva] : the conjunctiva exhibited no abnormalities [Auscultation Breath Sounds / Voice Sounds] : lungs were clear to auscultation bilaterally [Heart Rate And Rhythm] : heart rate and rhythm were normal [Heart Sounds] : normal S1 and S2 [Murmurs] : no murmurs present [Edema] : no peripheral edema present [Abdomen Soft] : soft [Abnormal Walk] : normal gait [Cyanosis, Localized] : no localized cyanosis [No Skin Ulcers] : no skin ulcer [Oriented To Time, Place, And Person] : oriented to person, place, and time [Affect] : the affect was normal [Bowel Sounds] : normal bowel sounds [Abdomen Tenderness] : non-tender [] : no ischemic changes [FreeTextEntry1] : Bilateral DP faint palpable. Bilateral DP and PT audible. Interdigital doppler audible bilaterally

## 2022-08-12 NOTE — REVIEW OF SYSTEMS
[Leg Claudication] : intermittent leg claudication [Negative] : Heme/Lymph [FreeTextEntry9] : see HPI

## 2022-08-12 NOTE — HISTORY OF PRESENT ILLNESS
[FreeTextEntry1] : 8/12/12\par \par Currently reports ambulating half a block before calf claudication occurs.\par Left leg worse than the right.\par No LE ulcerations.\par Did not go to SET therapy. Was walking outside at home last year.\par Started to walk again this Spring / Summer.\par Once or twice per week, for 30-45 minutes.\par Recently got a [employer] bike. \par \par Medications:\par  mg daily\par Plavix\par Norvasc\par Atorvastatin 80 mg daily\par Lisinopril 20 mg daily\par Metoprolol\par Metformin \par \par 5/14/2021\par Carotids with patent stents\par Arterial duplex with L sfa occlusion and r SFA stenosis.\par She can walk 1/2 block \par must stop due to calf pains \par pains resolve after stopping.  \par  [place]par no foot ulceration, tissue loss, no pain at rest\par \par \par

## 2022-08-12 NOTE — REASON FOR VISIT
[Follow-Up - Clinic] : a clinic follow-up of [Peripheral Vascular Disease] : peripheral vascular disease [FreeTextEntry2] : PAD

## 2022-08-22 NOTE — ED ADULT NURSE NOTE - JUGULAR VENOUS DISTENTION
Subjective:   Patient ID: Cheyanne Gomes is a 88 y.o. female     Chief Complaint:Wound Check (Right thigh) and Fatigue      Notes fatigue for past month. Notes improvement of late. Attributes to her chronic medical issues but would like routine bloodwork to eval potential other causes. Also notes redness and scab following a blister to right thigh that started 2 weeks ago.    Review of Systems   Respiratory: Negative for shortness of breath.    Cardiovascular: Negative for chest pain.   Gastrointestinal: Negative for abdominal pain.   Genitourinary: Negative for dysuria.   Skin: Positive for rash.     Past Medical History:   Diagnosis Date    Abdominal hernia     Anemia     Dr Berkowitz     Angina pectoris     Aortic valve stenosis, moderate     Back pain     Cannon's esophagus     CAD (coronary artery disease)     Cataract     ou done    CHF (congestive heart failure)     EFx 35%, Dr. Spencer 13    Chronic combined systolic and diastolic heart failure 2019    Colitis     Compression fracture     Diverticulosis     Encounter for blood transfusion     GERD (gastroesophageal reflux disease)     Hyperlipidemia     Hypertension     Irritable bowel syndrome     Myocardial infarction     Osteoarthritis     lumbar DDD    Pacemaker     Pneumonia 2017    Raynaud disease     Rheumatoid arteritis     Rheumatoid arthritis     Shingles 2017    Sjogren syndrome     Ulcerative colitis      Past Surgical History:   Procedure Laterality Date    A-V CARDIAC PACEMAKER INSERTION Left 2021    Procedure: INSERTION, CARDIAC PACEMAKER, DUAL CHAMBER  ([employer]);  Surgeon: Tera Blair MD;  Location[facility] CATH[facility]EP LAB;  Service: Cardiology;  Laterality: Left;    APPENDECTOMY      BACK SURGERY      CARDIAC SURGERY      CABGx3 in     CATARACT EXTRACTION      ou od d 11-15-12//     SECTION, CLASSIC      x 2    CHOLECYSTECTOMY      COLONOSCOPY   09/15/2011    Dr Anaya     COLONOSCOPY  01/10/2017    [facility] report sent to scanning    CORONARY ANGIOPLASTY      PCI x 1 in 2007    CORONARY ARTERY BYPASS GRAFT      3 vessel    CORONARY ARTERY BYPASS GRAFT      CYSTOSCOPY N/A 6/3/2020    Procedure: CYSTOSCOPY;  Surgeon: Miryam Bender Jr., MD;  Location: [facility] OR;  Service: Urology;  Laterality: N/A;    eyes      rk ou    FRACTURE SURGERY  06/21/2016    Dr Guido left hip     FRACTURE SURGERY  2018    Right Hip    HYSTERECTOMY      tubal ligation, oopherectomy    INTRAMEDULLARY RODDING OF TROCHANTER OF FEMUR Right 10/8/2018    Procedure: INSERTION, INTRAMEDULLARY KELSI, FEMUR, TROCHANTER;  Surgeon: Valerio Luther MD;  Location: [facility] OR;  Service: Orthopedics;  Laterality: Right;    OOPHORECTOMY      SPINE SURGERY  8-    Silvino Mckeon    UPPER GASTROINTESTINAL ENDOSCOPY      Yag Capsulotomy Right 9/25/14     Objective:     Vitals:    08/22/22 1255   BP: 124/66   Pulse: 60   Temp: 98.6 °F (37 °C)     Body mass index is 22.76 kg/m².  Physical Exam  Vitals and nursing note reviewed.   Constitutional:       Appearance: She is well-developed.   HENT:      Head: Normocephalic and atraumatic.   Eyes:      General: No scleral icterus.     Conjunctiva/sclera: Conjunctivae normal.   Cardiovascular:      Heart sounds: No murmur heard.  Pulmonary:      Effort: Pulmonary effort is normal. No respiratory distress.   Musculoskeletal:         General: No deformity. Normal range of motion.      Cervical back: Normal range of motion and neck supple.   Skin:     Coloration: Skin is not pale.      Findings: Rash (erythema surrouding eschar to right thigh) present.   Neurological:      Mental Status: She is alert and oriented to person, place, and time.   Psychiatric:         Behavior: Behavior normal.         Thought Content: Thought content normal.         Judgment: Judgment normal.       Assessment:     1. Fatigue, unspecified type    2. Abnormal finding of blood  chemistry, unspecified     3. Cellulitis of right thigh      Plan:   Fatigue, unspecified type  -     Hemoglobin; Future; Expected date: 08/22/2022  -     Platelet Count; Future; Expected date: 08/22/2022  -     Comprehensive Metabolic Panel; Future; Expected date: 08/22/2022  -     TSH; Future; Expected date: 08/22/2022  -     FERRITIN; Future; Expected date: 08/22/2022    Abnormal finding of blood chemistry, unspecified   -     FERRITIN; Future; Expected date: 08/22/2022    Cellulitis of right thigh  -     clindamycin (CLEOCIN) 300 MG capsule; Take 1 capsule (300 mg total) by mouth 3 (three) times daily. for 7 days  Dispense: 21 capsule; Refill: 0            Total time spent of Less than 30 minutes minutes on the day of the visit.This includes face to face time and preparing to see the patient, obtaining and reviewing separately obtained history, documenting clinical information in the electronic or other health record, independently interpreting results and communicating results to the patient/family/caregiver, or care coordinator.    Kareem Alvarez MD  08/22/2022    Portions of this note have been dictated with PRAFUL Davis         absent

## 2022-08-29 ENCOUNTER — APPOINTMENT (OUTPATIENT)
Dept: ULTRASOUND IMAGING | Facility: CLINIC | Age: 57
End: 2022-08-29

## 2022-08-29 PROCEDURE — 93880 EXTRACRANIAL BILAT STUDY: CPT

## 2022-09-08 ENCOUNTER — APPOINTMENT (OUTPATIENT)
Dept: NEUROSURGERY | Facility: CLINIC | Age: 57
End: 2022-09-08

## 2022-09-08 VITALS
DIASTOLIC BLOOD PRESSURE: 66 MMHG | HEART RATE: 86 BPM | SYSTOLIC BLOOD PRESSURE: 112 MMHG | BODY MASS INDEX: 40.92 KG/M2 | OXYGEN SATURATION: 96 % | TEMPERATURE: 97.2 F | WEIGHT: 270 LBS | HEIGHT: 68 IN

## 2022-09-08 PROCEDURE — 99212 OFFICE O/P EST SF 10 MIN: CPT

## 2022-09-09 NOTE — RESULTS/DATA
[FreeTextEntry1] : EXAM: [id number] - US DPLX CAROTIDS COMPL BI - ORDERED BY: CARLI HENRY\par \par \par PROCEDURE DATE: 08/29/2022\par \par \par \par INTERPRETATION: CLINICAL INFORMATION: Stenosis of the carotid arteries status post stenting\par \par COMPARISON: Carotid Doppler 5/3/2021\par \par TECHNIQUE: Grayscale, color and spectral Doppler examination of both carotid arteries was performed.\par \par FINDINGS:\par \par No abnormal waveforms are encountered. Patent bilateral stents with stable mild bilateral intimal ingrowth.\par \par Peak systolic velocities are as follows:\par \par RIGHT:\par PROX CCA = 59 cm/s\par DIST CCA = 176 cm/s\par PROX ICA = 88 cm/s\par DIST ICA = 77 cm/s\par ECA = 168 cm/s\par \par LEFT:\par PROX CCA = 59 cm/s\par DIST CCA = 114 cm/s\par PROX ICA = 94 cm/s\par DIST ICA = 84 cm/s\par ECA = 242 cm/s\par \par Antegrade flow is noted within both vertebral arteries.\par \par IMPRESSION:\par Patency of bilateral carotid stents again demonstrated.\par Elevated velocities on the right up to 292 cm/s. No left-sided elevated velocities.\par Bilateral external carotid artery stenosis is suggested.\par \par Measurement of carotid stenosis is based on velocity parameters that correlate the residual internal carotid diameter with that of the more distal vessel in accordance with a method such as the [place] Symptomatic Carotid Endarterectomy Trial (NASCET).\par \par --- End of Report ---\par \par \par \par

## 2022-09-09 NOTE — ASSESSMENT
[FreeTextEntry1] : Impression: 56yr old female s/p left carotid angioplasty and stenting 10/27/2020; s/p angioplasty and stenting of symptomatic right carotid stenosis 6/21/2019.\par Us 8/29/2022 lica psv 114 waqar psv 176\par Plan:\par remain on aspirin and plavix\par ultrasound does not show any severe narrowing requiring intervention at this time\par recommend repeat carotid doppler ultrasound in 1 year then follow up after

## 2022-09-09 NOTE — REASON FOR VISIT
[Follow-Up: _____] : a [unfilled] follow-up visit [FreeTextEntry1] : Patient here for a follow up visit after having new carotid ultrasound done. Today she feels well, denies any new motor sensory speech visual abnormalities. She is still on aspirin and plavix.

## 2022-10-26 ENCOUNTER — NON-APPOINTMENT (OUTPATIENT)
Age: 57
End: 2022-10-26

## 2022-10-26 ENCOUNTER — APPOINTMENT (OUTPATIENT)
Dept: CARDIOLOGY | Facility: CLINIC | Age: 57
End: 2022-10-26

## 2022-10-26 VITALS
OXYGEN SATURATION: 96 % | HEART RATE: 98 BPM | BODY MASS INDEX: 40.92 KG/M2 | WEIGHT: 270 LBS | DIASTOLIC BLOOD PRESSURE: 85 MMHG | HEIGHT: 68 IN | SYSTOLIC BLOOD PRESSURE: 139 MMHG

## 2022-10-26 DIAGNOSIS — I25.10 ATHEROSCLEROTIC HEART DISEASE OF NATIVE CORONARY ARTERY W/OUT ANGINA PECTORIS: ICD-10-CM

## 2022-10-26 PROCEDURE — 99214 OFFICE O/P EST MOD 30 MIN: CPT | Mod: 25

## 2022-10-26 PROCEDURE — 93000 ELECTROCARDIOGRAM COMPLETE: CPT

## 2022-11-01 NOTE — OCCUPATIONAL THERAPY INITIAL EVALUATION ADULT - STANDING BALANCE: DYNAMIC, REHAB EVAL
Pt was running at home and fell on right arm, Pain to right shoulder/clavicle, +full ROM to right elbow, pushing and pulling with right arm, unable to lift above head. +CMS, no deformity, Pt on day 7 of Amoxicillin for previous illness  
fair balance

## 2022-11-21 ENCOUNTER — RX RENEWAL (OUTPATIENT)
Age: 57
End: 2022-11-21

## 2022-11-28 PROBLEM — I25.10 TRIPLE VESSEL CORONARY ARTERY DISEASE: Status: ACTIVE | Noted: 2019-07-31

## 2022-11-28 NOTE — DISCUSSION/SUMMARY
[FreeTextEntry1] : Will need to get labs from PMD Dr. Stevens. \par She is currently only on Metformin - there is likely opportunity for SGLT-2\par Continue other medications for now\par 6 months\par \par  [EKG obtained to assist in diagnosis and management of assessed problem(s)] : EKG obtained to assist in diagnosis and management of assessed problem(s)

## 2023-03-29 ENCOUNTER — RX RENEWAL (OUTPATIENT)
Age: 58
End: 2023-03-29

## 2023-04-11 NOTE — PATIENT PROFILE ADULT - IS PATIENT POST-MENOPAUSAL?
Patient reports normal fetal movement. Denies vaginal bleeding, regular contractions or leakage of fluid. Given routine precautions. GBBS and growth on return visit.     no

## 2023-04-26 ENCOUNTER — APPOINTMENT (OUTPATIENT)
Dept: CARDIOLOGY | Facility: CLINIC | Age: 58
End: 2023-04-26
Payer: COMMERCIAL

## 2023-04-26 ENCOUNTER — NON-APPOINTMENT (OUTPATIENT)
Age: 58
End: 2023-04-26

## 2023-04-26 VITALS
OXYGEN SATURATION: 99 % | WEIGHT: 263 LBS | DIASTOLIC BLOOD PRESSURE: 87 MMHG | SYSTOLIC BLOOD PRESSURE: 172 MMHG | HEIGHT: 68 IN | BODY MASS INDEX: 39.86 KG/M2 | HEART RATE: 76 BPM

## 2023-04-26 VITALS — SYSTOLIC BLOOD PRESSURE: 166 MMHG | DIASTOLIC BLOOD PRESSURE: 86 MMHG

## 2023-04-26 PROCEDURE — 99453 REM MNTR PHYSIOL PARAM SETUP: CPT

## 2023-04-26 PROCEDURE — 93000 ELECTROCARDIOGRAM COMPLETE: CPT

## 2023-04-26 PROCEDURE — 99214 OFFICE O/P EST MOD 30 MIN: CPT

## 2023-04-26 NOTE — ASU PREOP CHECKLIST - TAMPON REMOVED
"Pt gave verbal approval to speak with his wife uJdi. Judi reports pt has been struggling with hemorrhoids for a couple of weeks. Has been managing at home with epsom baths, extra strength Preparation H cream, stool softeners, Tucks.  Tonight \"extreme pain\". Hemorrhoid is \"size of ping pong ball\" per Judi and \"yes\" large mass protruding from rectum per pt and Judi. Pt rates pain \"8\" and states since 7 pm tonight has been getting \"progressively worse\".     Writer [facility]    Judi verbalizes understanding and agrees to plan.    Reason for Disposition    Large mass protruding out of rectum    Additional Information    Negative: Foreign body in rectum    Negative: Diarrhea is main symptom    Negative: Constipation is main symptom (e.g., pain or discomfort caused by passage of hard BMs)    Negative: Blood in or on bowel movement is main symptom    Negative: Pregnant    Negative: Pinworms are suspected (rectal itching; (white thread-like worm about size of a staple, moves)    Negative: Sexual assault or rape (sexual intercourse or activity occurs without freely given consent), known or suspected    Negative: Injury to rectum    Answer Assessment - Initial Assessment Questions  1. SYMPTOM:  \"What's the main symptom you're concerned about?\" (e.g., pain, itching, swelling, rash)      Rectal pain   2. ONSET: \"When did the pain start?\"      7 pm  3. RECTAL PAIN: \"Do you have any pain around your rectum?\" \"How bad is the pain?\"  (Scale 0-10; or mild, moderate, severe)    - NONE (0): no pain    - MILD (1-3): doesn't interfere with normal activities     - MODERATE (4-7): interferes with normal activities or awakens from sleep, limping     - SEVERE (8-10): excruciating pain, unable to have a bowel movement       Pt rates pain \"8\"  4. RECTAL ITCHING: \"Do you have any itching in this area?\" \"How bad is the itching?\"  (Scale 0-10; or mild, moderate, severe)    - NONE: no " "itching    - MILD: doesn't interfere with normal activities     - MODERATE-SEVERE: interferes with normal activities or awakens from sleep      Pt denies  5. CONSTIPATION: \"Do you have constipation?\" If Yes, ask: \"How bad is it?\"      \"not the problems\"   6. CAUSE: \"What do you think is causing the anus symptoms?\"      hemorrhoids  7. OTHER SYMPTOMS: \"Do you have any other symptoms?\"  (e.g., abdomen pain, fever, rectal bleeding, vomiting)     Pt denies   8. PREGNANCY: \"Is there any chance you are pregnant?\" \"When was your last menstrual period?\"     n/a    Protocols used: RECTAL SYMPTOMS-A-AH      " n/a

## 2023-05-30 NOTE — HISTORY OF PRESENT ILLNESS
[FreeTextEntry1] : Ms. MENDEZ has a history of CAD. He denies a change in symptoms. He has no chest pain. No SOB. No palpitations. \par She is followed by Dr. Barrios regarding carotid disease. No related symptoms. \par Labs checked by PMD.

## 2023-05-30 NOTE — DISCUSSION/SUMMARY
[FreeTextEntry1] : Patient would benefit from addition of GLP-1\par BP elevated her but better at home. \par Labs with PMD in range per patient.  [EKG obtained to assist in diagnosis and management of assessed problem(s)] : EKG obtained to assist in diagnosis and management of assessed problem(s)

## 2023-06-02 ENCOUNTER — NON-APPOINTMENT (OUTPATIENT)
Age: 58
End: 2023-06-02

## 2023-06-05 ENCOUNTER — APPOINTMENT (OUTPATIENT)
Dept: CARDIOLOGY | Facility: CLINIC | Age: 58
End: 2023-06-05
Payer: COMMERCIAL

## 2023-06-05 PROCEDURE — 99458 RPM TX MGMT EA ADDL 20 MIN: CPT | Mod: 25

## 2023-06-05 PROCEDURE — 99454 REM MNTR PHYSIOL PARAM 16-30: CPT

## 2023-06-05 PROCEDURE — 99457 RPM TX MGMT 1ST 20 MIN: CPT

## 2023-06-12 NOTE — ASSESSMENT
[FreeTextEntry1] : Mariana is enrolled in the RPM program for a blood pressure monitoring from 05/05/2023 to 06/05/2023. Mariana had 9  escalations throughout her monitoring. \par \par Heart Rate:\par Range:  57-90 bpm\par Average 73 bpm\par Last Recorded: 06/04/23 81 bpm\par \par Blood Pressure:\par Range: 91/49 - 152/93 mmHg\par Average: 122/75 mmHg\par Last Recorded: 06/04/23- 112/75 mmHg\par \par Throughout her monitoring She was provided with education and rpm clinical guidance. \par \par \par A total of 5 units of 90848 accompany these RPM Services\par \par \par \par \par

## 2023-07-04 ENCOUNTER — NON-APPOINTMENT (OUTPATIENT)
Age: 58
End: 2023-07-04

## 2023-07-05 ENCOUNTER — APPOINTMENT (OUTPATIENT)
Dept: CARDIOLOGY | Facility: CLINIC | Age: 58
End: 2023-07-05
Payer: COMMERCIAL

## 2023-07-05 PROCEDURE — 99454 REM MNTR PHYSIOL PARAM 16-30: CPT

## 2023-07-05 PROCEDURE — 99458 RPM TX MGMT EA ADDL 20 MIN: CPT | Mod: 25

## 2023-07-05 PROCEDURE — 99457 RPM TX MGMT 1ST 20 MIN: CPT

## 2023-07-16 NOTE — ASSESSMENT
[FreeTextEntry1] : Ms. Mcgowan was enrolled in the RPM program for Blood Pressure and Heart Rate monitoring from  06/05/23 to 07/05/23.  She had no acute escalations throughout her monitoring period.    \par \par BP: 113/83\par \par Range: Systolic -   Diastolic - 62-91\par \par Average: Systolic - 116  Diastolic - 76\par \par HR:77bpm\par \par Range: 64-96bpm\par \par Average: 80bpm\par \par Last recorded: 07/04/23\par \par Throughout her monitoring period, she was provided with education and RPM clinical guidance.

## 2023-08-03 PROBLEM — I10 HYPERTENSION: Status: ACTIVE | Noted: 2019-07-31

## 2023-08-03 NOTE — DISCUSSION/SUMMARY
[A total of ____ days of data have been collected over the past 30 days] : A total of [unfilled] days of data have been collected over the past 30 days [Yes] : Yes

## 2023-08-04 ENCOUNTER — APPOINTMENT (OUTPATIENT)
Dept: CARDIOLOGY | Facility: CLINIC | Age: 58
End: 2023-08-04
Payer: COMMERCIAL

## 2023-08-04 ENCOUNTER — APPOINTMENT (OUTPATIENT)
Dept: ULTRASOUND IMAGING | Facility: CLINIC | Age: 58
End: 2023-08-04
Payer: COMMERCIAL

## 2023-08-04 DIAGNOSIS — I10 ESSENTIAL (PRIMARY) HYPERTENSION: ICD-10-CM

## 2023-08-04 PROCEDURE — 93925 LOWER EXTREMITY STUDY: CPT

## 2023-08-04 PROCEDURE — 99458 RPM TX MGMT EA ADDL 20 MIN: CPT

## 2023-08-04 PROCEDURE — 99457 RPM TX MGMT 1ST 20 MIN: CPT

## 2023-08-04 PROCEDURE — 99454 REM MNTR PHYSIOL PARAM 16-30: CPT

## 2023-08-11 NOTE — ASSESSMENT
[FreeTextEntry1] : Ms. Mcgowan was enrolled in the RPM program for blood pressure and heart rate monitoring from 7/4/2023 to 8/4/2023.  She had 1 acute escalation throughout her monitoring period.       Heart Rate:   Range:   bpm 57-97 bpm Average bpm  82 bpm Last Recorded: 08/04/23 bpm       Blood Pressure:  Range:  Systolic  90 -174mmHg              Diastolic 66 -90mmHg Average:   Systolic 115 mmHg Diastolic 78mmHg Last Recorded: 08/04/23 mmHg         Throughout her monitoring period, she was provided with education and RPM clinical guidance.

## 2023-08-18 ENCOUNTER — APPOINTMENT (OUTPATIENT)
Dept: CARDIOLOGY | Facility: CLINIC | Age: 58
End: 2023-08-18
Payer: COMMERCIAL

## 2023-08-18 VITALS
BODY MASS INDEX: 39.86 KG/M2 | DIASTOLIC BLOOD PRESSURE: 90 MMHG | SYSTOLIC BLOOD PRESSURE: 155 MMHG | WEIGHT: 263 LBS | HEIGHT: 68 IN | HEART RATE: 95 BPM | OXYGEN SATURATION: 98 %

## 2023-08-18 PROCEDURE — 99214 OFFICE O/P EST MOD 30 MIN: CPT

## 2023-08-18 NOTE — ASSESSMENT
[FreeTextEntry1] :  Assessment: 1.  PAD L SFA occlusion with collaterals R SFA disease R CFA disease Sherri Classification 3  Plan: 1.  On ASA and Plavix. Continue high intensity Statin. 2.  Reviewed walking therapy. Prefers to perform at home. Walking Instructions given to the patient: Step #1                   Stretch your leg and back muscles for 5 minutes Step #2                   Walk at a pace fast enough so you have to stop at around 2 blocks.                                  You should stop walking when reaching 3-4 on claudication scale.  Step # 3                  Stop walking completely and stand until the discomfort goes away. Do not slow down, but stop completely. Do not walk to the point of severe pain.  Step # 4                  Repeat this cycle of walking/stopping/walking/stopping etc for 45 min  Step # 5       When you are finished walking, spend 5 minutes cooling down and                                 stretch your leg and back muscles  3. Daily foot checks. 4. Reviewed indications for Pletal. Plan to hold off at this time due to common side effects.  5. Reviewed indications for angiogram. Will hold off at this time. Plan for medical therapy. 6. Call if any wounds or rest pain noted. 7. Continue excellent care with Dr. Moise.  8. I will ask her to see Dr. Bowen for her opinion when she returns from [employer] - end of Octoberg 9.  Hopefully, with the ozempic, as she loses weight, she will be able to walk further.  ** her veins were used for CABG***

## 2023-08-18 NOTE — PHYSICAL EXAM
[General Appearance - Well Developed] : well developed [Normal Appearance] : normal appearance [General Appearance - Well Nourished] : well nourished [Normal Conjunctiva] : the conjunctiva exhibited no abnormalities [Auscultation Breath Sounds / Voice Sounds] : lungs were clear to auscultation bilaterally [Heart Rate And Rhythm] : heart rate and rhythm were normal [Heart Sounds] : normal S1 and S2 [Murmurs] : no murmurs present [Edema] : no peripheral edema present [Bowel Sounds] : normal bowel sounds [Abdomen Soft] : soft [Abdomen Tenderness] : non-tender [Abnormal Walk] : normal gait [Cyanosis, Localized] : no localized cyanosis [] : no rash [No Skin Ulcers] : no skin ulcer [Oriented To Time, Place, And Person] : oriented to person, place, and time [Affect] : the affect was normal [FreeTextEntry1] :  Bilateral DP and PT audible. Interdigital doppler audible bilaterally

## 2023-08-18 NOTE — HISTORY OF PRESENT ILLNESS
[FreeTextEntry1] : 8/18/2023 Followup visit  Doing well Started on ozempic not walking much  walking 1/2 block then has to stop due to claudication - both legs.  No wounds.    8/12/12  Currently reports ambulating half a block before calf claudication occurs. Left leg worse than the right. No LE ulcerations. Did not go to SET therapy. Was walking outside at home last year. Started to walk again this Spring / Summer. Once or twice per week, for 30-45 minutes. Recently got a [employer] bike.   Medications:  mg daily Plavix Norvasc Atorvastatin 80 mg daily Lisinopril 20 mg daily Metoprolol Metformin   5/14/2021 Carotids with patent stents Arterial duplex with L sfa occlusion and r SFA stenosis. She can walk 1/2 block  must stop due to calf pains  pains resolve after stopping.    [place] in [place]. no foot ulceration, tissue loss, no pain at rest

## 2023-09-21 ENCOUNTER — RX RENEWAL (OUTPATIENT)
Age: 58
End: 2023-09-21

## 2023-09-21 RX ORDER — METOPROLOL TARTRATE 50 MG/1
50 TABLET, FILM COATED ORAL EVERY 8 HOURS
Qty: 270 | Refills: 3 | Status: ACTIVE | COMMUNITY
Start: 2019-10-16 | End: 1900-01-01

## 2023-10-12 ENCOUNTER — APPOINTMENT (OUTPATIENT)
Dept: ULTRASOUND IMAGING | Facility: CLINIC | Age: 58
End: 2023-10-12
Payer: COMMERCIAL

## 2023-10-12 PROCEDURE — 93880 EXTRACRANIAL BILAT STUDY: CPT

## 2023-10-19 ENCOUNTER — APPOINTMENT (OUTPATIENT)
Dept: NEUROSURGERY | Facility: CLINIC | Age: 58
End: 2023-10-19
Payer: COMMERCIAL

## 2023-10-19 PROCEDURE — 99441: CPT

## 2023-10-30 ENCOUNTER — APPOINTMENT (OUTPATIENT)
Dept: CARDIOLOGY | Facility: CLINIC | Age: 58
End: 2023-10-30
Payer: COMMERCIAL

## 2023-10-30 VITALS — HEART RATE: 80 BPM | SYSTOLIC BLOOD PRESSURE: 136 MMHG | DIASTOLIC BLOOD PRESSURE: 84 MMHG | OXYGEN SATURATION: 100 %

## 2023-10-30 DIAGNOSIS — I25.10 ATHEROSCLEROTIC HEART DISEASE OF NATIVE CORONARY ARTERY W/OUT ANGINA PECTORIS: ICD-10-CM

## 2023-10-30 PROCEDURE — 99214 OFFICE O/P EST MOD 30 MIN: CPT | Mod: 25

## 2023-10-30 PROCEDURE — 93000 ELECTROCARDIOGRAM COMPLETE: CPT

## 2023-11-01 ENCOUNTER — APPOINTMENT (OUTPATIENT)
Dept: CARDIOLOGY | Facility: CLINIC | Age: 58
End: 2023-11-01
Payer: COMMERCIAL

## 2023-11-01 VITALS
HEART RATE: 88 BPM | HEIGHT: 68 IN | OXYGEN SATURATION: 96 % | BODY MASS INDEX: 39.86 KG/M2 | SYSTOLIC BLOOD PRESSURE: 128 MMHG | DIASTOLIC BLOOD PRESSURE: 81 MMHG | WEIGHT: 263 LBS

## 2023-11-01 DIAGNOSIS — Z95.1 PRESENCE OF AORTOCORONARY BYPASS GRAFT: ICD-10-CM

## 2023-11-01 DIAGNOSIS — I73.9 PERIPHERAL VASCULAR DISEASE, UNSPECIFIED: ICD-10-CM

## 2023-11-01 DIAGNOSIS — I77.9 DISORDER OF ARTERIES AND ARTERIOLES, UNSPECIFIED: ICD-10-CM

## 2023-11-01 DIAGNOSIS — I63.9 CEREBRAL INFARCTION, UNSPECIFIED: ICD-10-CM

## 2023-11-01 PROCEDURE — 99205 OFFICE O/P NEW HI 60 MIN: CPT

## 2023-11-01 RX ORDER — LISINOPRIL 20 MG/1
20 TABLET ORAL DAILY
Qty: 90 | Refills: 3 | Status: DISCONTINUED | COMMUNITY
Start: 2019-11-07 | End: 2023-11-01

## 2023-11-01 RX ORDER — METFORMIN HYDROCHLORIDE 1000 MG/1
1000 TABLET, COATED ORAL TWICE DAILY
Qty: 180 | Refills: 0 | Status: ACTIVE | COMMUNITY

## 2023-11-01 RX ORDER — POLYETHYLENE GLYCOL 3350 17 G/17G
17 POWDER, FOR SOLUTION ORAL
Refills: 0 | Status: DISCONTINUED | COMMUNITY
Start: 2019-10-16 | End: 2023-11-01

## 2023-11-01 RX ORDER — SEMAGLUTIDE 1.34 MG/ML
2 INJECTION, SOLUTION SUBCUTANEOUS
Refills: 0 | Status: ACTIVE | COMMUNITY

## 2023-11-01 RX ORDER — FAMOTIDINE 20 MG/1
20 TABLET, FILM COATED ORAL DAILY
Refills: 0 | Status: DISCONTINUED | COMMUNITY
Start: 2019-10-16 | End: 2023-11-01

## 2023-11-01 RX ORDER — IPRATROPIUM BROMIDE 17 UG/1
17 AEROSOL, METERED RESPIRATORY (INHALATION)
Qty: 13 | Refills: 0 | Status: DISCONTINUED | COMMUNITY
Start: 2019-08-07 | End: 2023-11-01

## 2023-11-03 PROBLEM — Z95.1 S/P CABG X 4: Status: ACTIVE | Noted: 2019-10-22

## 2023-11-03 PROBLEM — I63.9 CVA (CEREBRAL VASCULAR ACCIDENT): Status: ACTIVE | Noted: 2019-07-31

## 2023-11-03 PROBLEM — I77.9 CAROTID DISEASE, BILATERAL: Status: ACTIVE | Noted: 2019-07-31

## 2023-11-03 PROBLEM — I73.9 PERIPHERAL ARTERIAL DISEASE: Status: ACTIVE | Noted: 2021-01-22

## 2024-03-23 ENCOUNTER — RX RENEWAL (OUTPATIENT)
Age: 59
End: 2024-03-23

## 2024-03-25 RX ORDER — AMLODIPINE BESYLATE 10 MG/1
10 TABLET ORAL DAILY
Qty: 90 | Refills: 1 | Status: ACTIVE | COMMUNITY
Start: 2019-12-04 | End: 1900-01-01

## 2024-05-01 ENCOUNTER — APPOINTMENT (OUTPATIENT)
Dept: CARDIOLOGY | Facility: CLINIC | Age: 59
End: 2024-05-01
Payer: COMMERCIAL

## 2024-05-01 VITALS
WEIGHT: 263 LBS | HEIGHT: 68 IN | DIASTOLIC BLOOD PRESSURE: 86 MMHG | OXYGEN SATURATION: 97 % | HEART RATE: 94 BPM | BODY MASS INDEX: 39.86 KG/M2 | SYSTOLIC BLOOD PRESSURE: 136 MMHG

## 2024-05-01 DIAGNOSIS — I50.22 CHRONIC SYSTOLIC (CONGESTIVE) HEART FAILURE: ICD-10-CM

## 2024-05-01 DIAGNOSIS — I10 ESSENTIAL (PRIMARY) HYPERTENSION: ICD-10-CM

## 2024-05-01 PROCEDURE — 99214 OFFICE O/P EST MOD 30 MIN: CPT | Mod: 25

## 2024-05-01 PROCEDURE — 93000 ELECTROCARDIOGRAM COMPLETE: CPT

## 2024-05-01 NOTE — PHYSICAL EXAM
[Well Developed] : well developed [Well Nourished] : well nourished [No Acute Distress] : no acute distress [Normal Conjunctiva] : normal conjunctiva [Normal Venous Pressure] : normal venous pressure [No Carotid Bruit] : no carotid bruit [Normal S1, S2] : normal S1, S2 [No Murmur] : no murmur [No Rub] : no rub [No Gallop] : no gallop [Clear Lung Fields] : clear lung fields [Good Air Entry] : good air entry [No Respiratory Distress] : no respiratory distress  [Soft] : abdomen soft [Non Tender] : non-tender [No Masses/organomegaly] : no masses/organomegaly [Normal Bowel Sounds] : normal bowel sounds [Normal Gait] : normal gait [No Edema] : no edema [No Rash] : no rash [No Skin Lesions] : no skin lesions [Moves all extremities] : moves all extremities [No Focal Deficits] : no focal deficits [Normal Speech] : normal speech [Alert and Oriented] : alert and oriented [Normal memory] : normal memory

## 2024-05-07 ENCOUNTER — RESULT REVIEW (OUTPATIENT)
Age: 59
End: 2024-05-07

## 2024-05-07 ENCOUNTER — OUTPATIENT (OUTPATIENT)
Dept: OUTPATIENT SERVICES | Facility: HOSPITAL | Age: 59
LOS: 1 days | End: 2024-05-07
Payer: COMMERCIAL

## 2024-05-07 ENCOUNTER — APPOINTMENT (OUTPATIENT)
Dept: CV DIAGNOSTICS | Facility: HOSPITAL | Age: 59
End: 2024-05-07

## 2024-05-07 DIAGNOSIS — Z95.1 PRESENCE OF AORTOCORONARY BYPASS GRAFT: Chronic | ICD-10-CM

## 2024-05-07 DIAGNOSIS — Z86.73 PERSONAL HISTORY OF TRANSIENT ISCHEMIC ATTACK (TIA), AND CEREBRAL INFARCTION WITHOUT RESIDUAL DEFICITS: Chronic | ICD-10-CM

## 2024-05-07 DIAGNOSIS — I25.10 ATHEROSCLEROTIC HEART DISEASE OF NATIVE CORONARY ARTERY WITHOUT ANGINA PECTORIS: ICD-10-CM

## 2024-05-07 PROCEDURE — A9500: CPT

## 2024-05-07 PROCEDURE — 78452 HT MUSCLE IMAGE SPECT MULT: CPT | Mod: MC

## 2024-05-07 PROCEDURE — 78452 HT MUSCLE IMAGE SPECT MULT: CPT | Mod: 26,MC

## 2024-05-07 PROCEDURE — 93018 CV STRESS TEST I&R ONLY: CPT | Mod: MC

## 2024-05-07 PROCEDURE — 93016 CV STRESS TEST SUPVJ ONLY: CPT | Mod: MC

## 2024-05-07 PROCEDURE — 93017 CV STRESS TEST TRACING ONLY: CPT

## 2024-05-14 NOTE — HISTORY OF PRESENT ILLNESS
[FreeTextEntry1] : Ms. MENDEZ presents for the evaluation of CAD and vascular disease.  She went to [facility] which she did ok with for the most part. No chest pain. No SOB.  She is on Ozempic.

## 2024-05-14 NOTE — DISCUSSION/SUMMARY
[EKG obtained to assist in diagnosis and management of assessed problem(s)] : EKG obtained to assist in diagnosis and management of assessed problem(s) [FreeTextEntry1] : Given history would do stress.  Same medications.  Will confirm recent LDL.

## 2024-06-14 ENCOUNTER — TRANSCRIPTION ENCOUNTER (OUTPATIENT)
Age: 59
End: 2024-06-14

## 2024-06-21 ENCOUNTER — TRANSCRIPTION ENCOUNTER (OUTPATIENT)
Age: 59
End: 2024-06-21

## 2024-07-17 NOTE — ED PROVIDER NOTE - CROS ED NEURO POS
Patient has a history of severe asthma  Continue home inhalers  No evidence of acute exacerbation  Nebulizers as needed   left arm weakness

## 2024-08-20 ENCOUNTER — NON-APPOINTMENT (OUTPATIENT)
Age: 59
End: 2024-08-20

## 2024-08-21 ENCOUNTER — APPOINTMENT (OUTPATIENT)
Dept: CARDIOLOGY | Facility: CLINIC | Age: 59
End: 2024-08-21
Payer: COMMERCIAL

## 2024-08-21 VITALS
SYSTOLIC BLOOD PRESSURE: 146 MMHG | HEART RATE: 80 BPM | OXYGEN SATURATION: 94 % | BODY MASS INDEX: 39.86 KG/M2 | WEIGHT: 263 LBS | DIASTOLIC BLOOD PRESSURE: 76 MMHG | HEIGHT: 68 IN

## 2024-08-21 DIAGNOSIS — G40.409 OTHER GENERALIZED EPILEPSY AND EPILEPTIC SYNDROMES, NOT INTRACTABLE, W/OUT STATUS EPILEPTICUS: ICD-10-CM

## 2024-08-21 DIAGNOSIS — Z83.3 FAMILY HISTORY OF DIABETES MELLITUS: ICD-10-CM

## 2024-08-21 DIAGNOSIS — F17.200 NICOTINE DEPENDENCE, UNSPECIFIED, UNCOMPLICATED: ICD-10-CM

## 2024-08-21 DIAGNOSIS — I73.9 PERIPHERAL VASCULAR DISEASE, UNSPECIFIED: ICD-10-CM

## 2024-08-21 PROCEDURE — G2211 COMPLEX E/M VISIT ADD ON: CPT | Mod: NC

## 2024-08-21 PROCEDURE — 99214 OFFICE O/P EST MOD 30 MIN: CPT

## 2024-08-26 NOTE — H&P PST ADULT - EYES
RCA Cine(s)  injected and visualized utilizing power injector system. EOMI; PERRL; no drainage or redness

## 2024-08-27 NOTE — HISTORY OF PRESENT ILLNESS
[FreeTextEntry1] : 8/2024  No C/P or SOB.  No LE wounds or rest pain. Ambulating 1 block before claudication. Left greater than right lower extremity.  Discussed indication for angiography. Preference for medical therapy.  LDL 48 in March.  Medications: ASA  Plavix Norvasc Atorvastatin 80 mg daily Lisinopril  Metoprolol Metformin  Mounjaro  8/18/2023 Followup visit  Doing well Started on ozempic not walking much  walking 1/2 block then has to stop due to claudication - both legs.  No wounds.    8/12/12  Currently reports ambulating half a block before calf claudication occurs. Left leg worse than the right. No LE ulcerations. Did not go to SET therapy. Was walking outside at home last year. Started to walk again this Spring / Summer. Once or twice per week, for 30-45 minutes. Recently got a [employer] bike.   Medications:  mg daily Plavix Norvasc Atorvastatin 80 mg daily Lisinopril 20 mg daily Metoprolol Metformin   5/14/2021 Carotids with patent stents Arterial duplex with L sfa occlusion and r SFA stenosis. She can walk 1/2 block  must stop due to calf pains  pains resolve after stopping.    Lives in [place]. no foot ulceration, tissue loss, no pain at rest

## 2024-08-27 NOTE — HISTORY OF PRESENT ILLNESS
[FreeTextEntry1] : 8/2024  No C/P or SOB.  No LE wounds or rest pain. Ambulating 1 block before claudication. Left greater than right lower extremity.  Discussed indication for angiography. Preference for medical therapy.  LDL 48 in March.  Medications: ASA  Plavix Norvasc Atorvastatin 80 mg daily Lisinopril  Metoprolol Metformin  Mounjaro  8/18/2023 Followup visit  Doing well Started on ozempic not walking much  walking 1/2 block then has to stop due to claudication - both legs.  No wounds.    8/12/12  Currently reports ambulating half a block before calf claudication occurs. Left leg worse than the right. No LE ulcerations. Did not go to SET therapy. Was walking outside at home last year. Started to walk again this Spring / Summer. Once or twice per week, for 30-45 minutes. Recently got a [employer] bike.   Medications:  mg daily Plavix Norvasc Atorvastatin 80 mg daily Lisinopril 20 mg daily Metoprolol Metformin   5/14/2021 Carotids with patent stents Arterial duplex with L sfa occlusion and r SFA stenosis. She can walk 1/2 block  must stop due to calf pains  pains resolve after stopping.    Lives in [place]. no foot ulceration, tissue loss, no pain at rest

## 2024-08-27 NOTE — PHYSICAL EXAM
[General Appearance - Well Developed] : well developed [Normal Appearance] : normal appearance [General Appearance - Well Nourished] : well nourished [Normal Conjunctiva] : the conjunctiva exhibited no abnormalities [Auscultation Breath Sounds / Voice Sounds] : lungs were clear to auscultation bilaterally [Heart Rate And Rhythm] : heart rate and rhythm were normal [Heart Sounds] : normal S1 and S2 [Murmurs] : no murmurs present [Edema] : no peripheral edema present [Abdomen Soft] : soft [Bowel Sounds] : normal bowel sounds [Abdomen Tenderness] : non-tender [Abnormal Walk] : normal gait [Cyanosis, Localized] : no localized cyanosis [] : no rash [No Skin Ulcers] : no skin ulcer [Oriented To Time, Place, And Person] : oriented to person, place, and time [Affect] : the affect was normal [FreeTextEntry1] : Palpable L DP and PT. Audible R DP and PT. Interdigital doppler audible bilaterally

## 2024-08-27 NOTE — ASSESSMENT
[FreeTextEntry1] :  Assessment: 1.  PAD L SFA occlusion with collaterals R SFA disease R CFA disease Sherri Classification 3  Plan: A. PAD:  1.  Continue ASA and Plavix. Continue high intensity Statin. 2. Consider Xarelto 2.5 mg BID and ASA 81 mg daily (VOYAGER and COMPASS) in the future.  3. Reviewed indications for angiogram. Will hold off at this time. Plan for medical therapy. 4. Call if any wounds or rest pain noted.  B. Claudication: 1.  Reviewed walking therapy. Prefers to perform at home. 2. Walking Instructions given to the patient:  C. HLD 1.Continue Statin. LDL less than 70.   D. Surveillance Testin. LE arterial duplex ordered.  2. She has a palpable L DP and PT. Audible R DP and PT.   Continue excellent care with Dr. Moise. Follow-up in 6 months. Call with any questions. Office will call with test results.      I, Dr. Ron Moe, personally performed the evaluation and management (E/M) services for this established patient who presents today.  That E/M includes conducting the examination, assessing all new/exacerbated conditions, and establishing a new plan of care.  Today, my ACP, Liam Fontana, was here to observe my evaluation and management services for this new problem/exacerbated condition to be followed going forward.

## 2024-11-06 ENCOUNTER — NON-APPOINTMENT (OUTPATIENT)
Age: 59
End: 2024-11-06

## 2024-11-06 ENCOUNTER — APPOINTMENT (OUTPATIENT)
Dept: CARDIOLOGY | Facility: CLINIC | Age: 59
End: 2024-11-06

## 2024-11-06 VITALS
SYSTOLIC BLOOD PRESSURE: 134 MMHG | DIASTOLIC BLOOD PRESSURE: 83 MMHG | HEART RATE: 83 BPM | WEIGHT: 263 LBS | BODY MASS INDEX: 39.99 KG/M2 | OXYGEN SATURATION: 97 %

## 2024-11-06 PROCEDURE — 93000 ELECTROCARDIOGRAM COMPLETE: CPT

## 2024-11-06 PROCEDURE — 99214 OFFICE O/P EST MOD 30 MIN: CPT | Mod: 25

## 2024-11-25 ENCOUNTER — RX RENEWAL (OUTPATIENT)
Age: 59
End: 2024-11-25

## 2025-02-17 ENCOUNTER — APPOINTMENT (OUTPATIENT)
Dept: ULTRASOUND IMAGING | Facility: CLINIC | Age: 60
End: 2025-02-17
Payer: COMMERCIAL

## 2025-02-17 PROCEDURE — 93925 LOWER EXTREMITY STUDY: CPT

## 2025-02-17 PROCEDURE — 93880 EXTRACRANIAL BILAT STUDY: CPT

## 2025-02-21 ENCOUNTER — NON-APPOINTMENT (OUTPATIENT)
Age: 60
End: 2025-02-21

## 2025-02-21 ENCOUNTER — APPOINTMENT (OUTPATIENT)
Dept: CARDIOLOGY | Facility: CLINIC | Age: 60
End: 2025-02-21
Payer: COMMERCIAL

## 2025-02-21 VITALS
DIASTOLIC BLOOD PRESSURE: 84 MMHG | WEIGHT: 263 LBS | BODY MASS INDEX: 39.86 KG/M2 | HEART RATE: 83 BPM | OXYGEN SATURATION: 98 % | HEIGHT: 68 IN | SYSTOLIC BLOOD PRESSURE: 148 MMHG

## 2025-02-21 DIAGNOSIS — I73.9 PERIPHERAL VASCULAR DISEASE, UNSPECIFIED: ICD-10-CM

## 2025-02-21 PROCEDURE — G2211 COMPLEX E/M VISIT ADD ON: CPT | Mod: NC

## 2025-02-21 PROCEDURE — 99214 OFFICE O/P EST MOD 30 MIN: CPT

## 2025-03-13 ENCOUNTER — NON-APPOINTMENT (OUTPATIENT)
Age: 60
End: 2025-03-13

## 2025-03-13 ENCOUNTER — APPOINTMENT (OUTPATIENT)
Dept: NEUROSURGERY | Facility: CLINIC | Age: 60
End: 2025-03-13
Payer: COMMERCIAL

## 2025-03-13 VITALS
WEIGHT: 260 LBS | HEART RATE: 71 BPM | SYSTOLIC BLOOD PRESSURE: 165 MMHG | HEIGHT: 68 IN | OXYGEN SATURATION: 98 % | DIASTOLIC BLOOD PRESSURE: 85 MMHG | BODY MASS INDEX: 39.4 KG/M2

## 2025-03-13 DIAGNOSIS — I77.9 DISORDER OF ARTERIES AND ARTERIOLES, UNSPECIFIED: ICD-10-CM

## 2025-03-13 PROCEDURE — 99215 OFFICE O/P EST HI 40 MIN: CPT

## 2025-05-10 ENCOUNTER — NON-APPOINTMENT (OUTPATIENT)
Age: 60
End: 2025-05-10

## 2025-05-12 ENCOUNTER — NON-APPOINTMENT (OUTPATIENT)
Age: 60
End: 2025-05-12

## 2025-05-12 ENCOUNTER — APPOINTMENT (OUTPATIENT)
Dept: CARDIOLOGY | Facility: CLINIC | Age: 60
End: 2025-05-12
Payer: COMMERCIAL

## 2025-05-12 VITALS
OXYGEN SATURATION: 97 % | HEART RATE: 77 BPM | DIASTOLIC BLOOD PRESSURE: 82 MMHG | SYSTOLIC BLOOD PRESSURE: 137 MMHG | HEIGHT: 68 IN | BODY MASS INDEX: 39.25 KG/M2 | WEIGHT: 259 LBS

## 2025-05-12 DIAGNOSIS — I10 ESSENTIAL (PRIMARY) HYPERTENSION: ICD-10-CM

## 2025-05-12 DIAGNOSIS — I50.22 CHRONIC SYSTOLIC (CONGESTIVE) HEART FAILURE: ICD-10-CM

## 2025-05-12 DIAGNOSIS — Z95.1 PRESENCE OF AORTOCORONARY BYPASS GRAFT: ICD-10-CM

## 2025-05-12 PROCEDURE — 93000 ELECTROCARDIOGRAM COMPLETE: CPT

## 2025-05-12 PROCEDURE — 99214 OFFICE O/P EST MOD 30 MIN: CPT

## 2025-05-12 PROCEDURE — G2211 COMPLEX E/M VISIT ADD ON: CPT | Mod: NC

## 2025-06-19 NOTE — BRIEF OPERATIVE NOTE - IV INFUSIONS - BLOOD PRODUCTS
Health Maintenance       COVID-19 Vaccine (2 - 2024-25 season)  Overdue since 9/1/2024    Pneumococcal Vaccine 50+ (1 of 2 - PCV)  Never done    Respiratory Syncytial Virus (RSV) Vaccine 60+ (1 - Risk 60-74 years 1-dose series)  Never done    Depression Screening (Yearly)  Due soon on 8/15/2025           Following review of the above:  Patient is not proceeding with: COVID-19, Pneumococcal, and Respiratory Syncytial Virus (RSV)    Note: Refer to final orders and clinician documentation.         0